# Patient Record
Sex: MALE | Race: WHITE | NOT HISPANIC OR LATINO | Employment: OTHER | URBAN - METROPOLITAN AREA
[De-identification: names, ages, dates, MRNs, and addresses within clinical notes are randomized per-mention and may not be internally consistent; named-entity substitution may affect disease eponyms.]

---

## 2017-01-11 ENCOUNTER — FOLLOW UP (OUTPATIENT)
Dept: URBAN - METROPOLITAN AREA CLINIC 33 | Facility: CLINIC | Age: 82
End: 2017-01-11

## 2017-01-11 VITALS
WEIGHT: 145 LBS | DIASTOLIC BLOOD PRESSURE: 90 MMHG | SYSTOLIC BLOOD PRESSURE: 150 MMHG | HEART RATE: 96 BPM | BODY MASS INDEX: 21.98 KG/M2 | HEIGHT: 68 IN

## 2017-01-11 DIAGNOSIS — H40.1130: ICD-10-CM

## 2017-01-11 DIAGNOSIS — H35.341: ICD-10-CM

## 2017-01-11 DIAGNOSIS — H35.372: ICD-10-CM

## 2017-01-11 DIAGNOSIS — H43.812: ICD-10-CM

## 2017-01-11 DIAGNOSIS — H35.81: ICD-10-CM

## 2017-01-11 PROCEDURE — 1036F TOBACCO NON-USER: CPT

## 2017-01-11 PROCEDURE — 92250 FUNDUS PHOTOGRAPHY W/I&R: CPT

## 2017-01-11 PROCEDURE — 92014 COMPRE OPH EXAM EST PT 1/>: CPT

## 2017-01-11 PROCEDURE — 2027F OPTIC NERVE HEAD EVAL DONE: CPT

## 2017-01-11 PROCEDURE — G8420 CALC BMI NORM PARAMETERS: HCPCS

## 2017-01-11 PROCEDURE — G8427 DOCREV CUR MEDS BY ELIG CLIN: HCPCS

## 2017-01-11 ASSESSMENT — VISUAL ACUITY
OD_SC: 20/100
OS_SC: 20/25-2

## 2017-01-11 ASSESSMENT — TONOMETRY
OD_IOP_MMHG: 17
OS_IOP_MMHG: 17

## 2017-01-13 ENCOUNTER — RX ONLY (OUTPATIENT)
Age: 82
Setting detail: RX ONLY
End: 2017-01-13

## 2017-01-13 RX ORDER — KETOCONAZOLE 20.5 MG/ML
SHAMPOO, SUSPENSION TOPICAL
Qty: 1 | Refills: 11 | Status: ERX

## 2017-08-09 ENCOUNTER — EMERGENCY (EMERGENCY)
Facility: HOSPITAL | Age: 82
LOS: 1 days | Discharge: HOME | End: 2017-08-09
Attending: EMERGENCY MEDICINE | Admitting: EMERGENCY MEDICINE
Payer: MEDICARE

## 2017-08-09 VITALS
DIASTOLIC BLOOD PRESSURE: 102 MMHG | SYSTOLIC BLOOD PRESSURE: 203 MMHG | OXYGEN SATURATION: 99 % | RESPIRATION RATE: 16 BRPM | TEMPERATURE: 98 F | HEART RATE: 72 BPM

## 2017-08-09 VITALS — HEART RATE: 70 BPM | DIASTOLIC BLOOD PRESSURE: 90 MMHG | SYSTOLIC BLOOD PRESSURE: 190 MMHG
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
## 2017-08-09 DIAGNOSIS — S09.90XA UNSPECIFIED INJURY OF HEAD, INITIAL ENCOUNTER: ICD-10-CM

## 2017-08-09 DIAGNOSIS — I10 ESSENTIAL (PRIMARY) HYPERTENSION: ICD-10-CM
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
## 2017-08-09 PROCEDURE — 99284 EMERGENCY DEPT VISIT MOD MDM: CPT | Mod: 25

## 2017-08-09 PROCEDURE — 99284 EMERGENCY DEPT VISIT MOD MDM: CPT

## 2017-08-09 PROCEDURE — 72125 CT NECK SPINE W/O DYE: CPT

## 2017-08-09 PROCEDURE — 72125 CT NECK SPINE W/O DYE: CPT | Mod: 26

## 2017-08-09 PROCEDURE — 70450 CT HEAD/BRAIN W/O DYE: CPT | Mod: 26

## 2017-08-09 PROCEDURE — 70450 CT HEAD/BRAIN W/O DYE: CPT

## 2017-08-09 NOTE — ED ADULT TRIAGE NOTE - CHIEF COMPLAINT QUOTE
pt arrived via EMS, stating that a box fell on top of nose. denies headache, LOC. abrasion to nose, bleeding controlled.

## 2017-08-09 NOTE — ED ADULT NURSE NOTE - OBJECTIVE STATEMENT
received pt in [facility] w/ abrasions to nose & forehead s/p box falling out of closet & hitting him in face last night Denies LOC Pt seen by Dr Adams Ct scan ordered

## 2017-08-09 NOTE — ED PROVIDER NOTE - CHPI ED SYMPTOMS NEG
no nausea/no syncope/no change in level of consciousness/no vomiting/no blurred vision/no seizure/no loss of consciousness

## 2018-04-17 ENCOUNTER — EMERGENCY (EMERGENCY)
Facility: HOSPITAL | Age: 83
LOS: 1 days | Discharge: ANOTHER HOSPITAL | End: 2018-04-17
Attending: EMERGENCY MEDICINE | Admitting: EMERGENCY MEDICINE
Payer: MEDICARE

## 2018-04-17 ENCOUNTER — INPATIENT (INPATIENT)
Facility: HOSPITAL | Age: 83
LOS: 1 days | Discharge: HOME | DRG: 155 | End: 2018-04-19
Attending: SURGERY | Admitting: SURGERY
Payer: MEDICARE

## 2018-04-17 VITALS
DIASTOLIC BLOOD PRESSURE: 92 MMHG | HEART RATE: 85 BPM | SYSTOLIC BLOOD PRESSURE: 151 MMHG | RESPIRATION RATE: 16 BRPM | OXYGEN SATURATION: 97 %

## 2018-04-17 VITALS
TEMPERATURE: 98 F | HEART RATE: 89 BPM | RESPIRATION RATE: 16 BRPM | DIASTOLIC BLOOD PRESSURE: 94 MMHG | OXYGEN SATURATION: 97 % | HEIGHT: 68 IN | SYSTOLIC BLOOD PRESSURE: 190 MMHG | WEIGHT: 164.02 LBS

## 2018-04-17 VITALS
RESPIRATION RATE: 18 BRPM | SYSTOLIC BLOOD PRESSURE: 162 MMHG | TEMPERATURE: 99 F | HEART RATE: 83 BPM | OXYGEN SATURATION: 96 % | DIASTOLIC BLOOD PRESSURE: 94 MMHG

## 2018-04-17 DIAGNOSIS — S13.5XXA: ICD-10-CM

## 2018-04-17 DIAGNOSIS — M10.9 GOUT, UNSPECIFIED: ICD-10-CM
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
## 2018-04-17 DIAGNOSIS — Z88.2 ALLERGY STATUS TO SULFONAMIDES: ICD-10-CM

## 2018-04-17 DIAGNOSIS — D33.3 BENIGN NEOPLASM OF CRANIAL NERVES: ICD-10-CM

## 2018-04-17 DIAGNOSIS — S01.81XA LACERATION WITHOUT FOREIGN BODY OF OTHER PART OF HEAD, INITIAL ENCOUNTER: ICD-10-CM

## 2018-04-17 DIAGNOSIS — I10 ESSENTIAL (PRIMARY) HYPERTENSION: ICD-10-CM

## 2018-04-17 DIAGNOSIS — N18.9 CHRONIC KIDNEY DISEASE, UNSPECIFIED: ICD-10-CM

## 2018-04-17 DIAGNOSIS — S09.90XA UNSPECIFIED INJURY OF HEAD, INITIAL ENCOUNTER: ICD-10-CM

## 2018-04-17 DIAGNOSIS — K51.90 ULCERATIVE COLITIS, UNSPECIFIED, WITHOUT COMPLICATIONS: ICD-10-CM

## 2018-04-17 DIAGNOSIS — G20 PARKINSON'S DISEASE: ICD-10-CM

## 2018-04-17 LAB
ALBUMIN SERPL ELPH-MCNC: 4.1 G/DL — SIGNIFICANT CHANGE UP (ref 3.3–5)
ALP SERPL-CCNC: 115 U/L — SIGNIFICANT CHANGE UP (ref 40–120)
ALT FLD-CCNC: 21 U/L — SIGNIFICANT CHANGE UP (ref 12–78)
ANION GAP SERPL CALC-SCNC: 11 MMOL/L — SIGNIFICANT CHANGE UP (ref 5–17)
APTT BLD: 42.1 SEC — HIGH (ref 27.5–37.4)
AST SERPL-CCNC: 28 U/L — SIGNIFICANT CHANGE UP (ref 15–37)
BASOPHILS # BLD AUTO: 0.1 K/UL — SIGNIFICANT CHANGE UP (ref 0–0.2)
BASOPHILS NFR BLD AUTO: 0.5 % — SIGNIFICANT CHANGE UP (ref 0–2)
BILIRUB SERPL-MCNC: 0.5 MG/DL — SIGNIFICANT CHANGE UP (ref 0.2–1.2)
BUN SERPL-MCNC: 38 MG/DL — HIGH (ref 7–23)
CALCIUM SERPL-MCNC: 9 MG/DL — SIGNIFICANT CHANGE UP (ref 8.5–10.1)
CHLORIDE SERPL-SCNC: 107 MMOL/L — SIGNIFICANT CHANGE UP (ref 96–108)
CO2 SERPL-SCNC: 24 MMOL/L — SIGNIFICANT CHANGE UP (ref 22–31)
CREAT SERPL-MCNC: 2.1 MG/DL — HIGH (ref 0.5–1.3)
EOSINOPHIL # BLD AUTO: 0 K/UL — SIGNIFICANT CHANGE UP (ref 0–0.5)
EOSINOPHIL NFR BLD AUTO: 0.1 % — SIGNIFICANT CHANGE UP (ref 0–6)
GLUCOSE SERPL-MCNC: 128 MG/DL — HIGH (ref 70–99)
HCT VFR BLD CALC: 35.2 % — LOW (ref 39–50)
HGB BLD-MCNC: 11.8 G/DL — LOW (ref 13–17)
INR BLD: 1 RATIO — SIGNIFICANT CHANGE UP (ref 0.88–1.16)
LYMPHOCYTES # BLD AUTO: 1.7 K/UL — SIGNIFICANT CHANGE UP (ref 1–3.3)
LYMPHOCYTES # BLD AUTO: 15.5 % — SIGNIFICANT CHANGE UP (ref 13–44)
MCHC RBC-ENTMCNC: 33.4 GM/DL — SIGNIFICANT CHANGE UP (ref 32–36)
MCHC RBC-ENTMCNC: 34.5 PG — HIGH (ref 27–34)
MCV RBC AUTO: 103 FL — HIGH (ref 80–100)
MONOCYTES # BLD AUTO: 1 K/UL — HIGH (ref 0–0.9)
MONOCYTES NFR BLD AUTO: 9.3 % — HIGH (ref 1–9)
NEUTROPHILS # BLD AUTO: 8.2 K/UL — HIGH (ref 1.8–7.4)
NEUTROPHILS NFR BLD AUTO: 74.7 % — SIGNIFICANT CHANGE UP (ref 43–77)
PLATELET # BLD AUTO: 187 K/UL — SIGNIFICANT CHANGE UP (ref 150–400)
POTASSIUM SERPL-MCNC: 4.5 MMOL/L — SIGNIFICANT CHANGE UP (ref 3.5–5.3)
POTASSIUM SERPL-SCNC: 4.5 MMOL/L — SIGNIFICANT CHANGE UP (ref 3.5–5.3)
PROT SERPL-MCNC: 7.5 G/DL — SIGNIFICANT CHANGE UP (ref 6–8.3)
PROTHROM AB SERPL-ACNC: 10.9 SEC — SIGNIFICANT CHANGE UP (ref 9.8–12.7)
RBC # BLD: 3.42 M/UL — LOW (ref 4.2–5.8)
RBC # FLD: 13 % — SIGNIFICANT CHANGE UP (ref 10.3–14.5)
SODIUM SERPL-SCNC: 142 MMOL/L — SIGNIFICANT CHANGE UP (ref 135–145)
WBC # BLD: 10.9 K/UL — HIGH (ref 3.8–10.5)
WBC # FLD AUTO: 10.9 K/UL — HIGH (ref 3.8–10.5)

## 2018-04-17 PROCEDURE — 72125 CT NECK SPINE W/O DYE: CPT | Mod: 26

## 2018-04-17 PROCEDURE — 85610 PROTHROMBIN TIME: CPT

## 2018-04-17 PROCEDURE — 99285 EMERGENCY DEPT VISIT HI MDM: CPT | Mod: GC

## 2018-04-17 PROCEDURE — 72125 CT NECK SPINE W/O DYE: CPT

## 2018-04-17 PROCEDURE — 13132 CMPLX RPR F/C/C/M/N/AX/G/H/F: CPT

## 2018-04-17 PROCEDURE — 70486 CT MAXILLOFACIAL W/O DYE: CPT | Mod: 26

## 2018-04-17 PROCEDURE — 70486 CT MAXILLOFACIAL W/O DYE: CPT

## 2018-04-17 PROCEDURE — 70490 CT SOFT TISSUE NECK W/O DYE: CPT

## 2018-04-17 PROCEDURE — 85027 COMPLETE CBC AUTOMATED: CPT

## 2018-04-17 PROCEDURE — 70450 CT HEAD/BRAIN W/O DYE: CPT

## 2018-04-17 PROCEDURE — 85730 THROMBOPLASTIN TIME PARTIAL: CPT

## 2018-04-17 PROCEDURE — 70450 CT HEAD/BRAIN W/O DYE: CPT | Mod: 26

## 2018-04-17 PROCEDURE — 80053 COMPREHEN METABOLIC PANEL: CPT

## 2018-04-17 PROCEDURE — 99285 EMERGENCY DEPT VISIT HI MDM: CPT | Mod: 25

## 2018-04-17 PROCEDURE — 70490 CT SOFT TISSUE NECK W/O DYE: CPT | Mod: 26

## 2018-04-17 NOTE — CONSULT NOTE ADULT - ASSESSMENT
a/p  requires exploration of wound   excision and debridement and complex 5 cm laceration repair    nature of wound d/w ED an trauma team ns, for possible further w/u  f/u as outpt

## 2018-04-17 NOTE — ED ADULT NURSE NOTE - OBJECTIVE STATEMENT
84 y/o male BIB EMS transfer from [facility] for neck injury, [facility], with laceration to rt side of chin, sutures intact.  As per EMS, pt transferred for fracture of thyroid cartilage, needs further evaluation and endoscopy.  Pt denies LOC, dizziness,  lightheadedness, pain, SOB, chest pain.  No acute respiratory distress noted. 82 y/o male BIB EMS transfer from [facility] for neck injury, s/p tripped and fell [facility] at the [facility], with laceration to rt side of chin, sutures intact.  As per EMS, pt transferred for fracture of thyroid cartilage, needs further evaluation/ trauma consult.  Pt denies LOC, dizziness,  lightheadedness, pain, SOB, chest pain.  No acute respiratory distress noted.

## 2018-04-17 NOTE — H&P ADULT - HISTORY OF PRESENT ILLNESS
Trauma Consult    HPI: Patient is a 83y old  Male was transferred from [place] (mrn- [id number]) for trauma consult who recommended ENT input. Pt with known hx of parkinsons, UC, HTN s/p mechanical fall early this morning at assisted living, hitting his neck on side of TV table. Pt was BIB EMS to [facility] where he had his neck lac repaired by plastic surgery.  Pt Ct without contrast reveals Extensive infiltration of the deep and subcutaneous soft tissues of right side of the neck with multiple gas bubbles dated the right masseter and pterygoid muscles with focal fracture of the left inferior thyroid cartilage. No evidence of mass effect on the airway. Pt denies any dyspnea, change in voice, dysphonia, dysphagia, odynophagia, hemoptysis, neck pain. Pt is breathing conformably in bed with no difficulty.       Primary Survey  A - airway intact  B - bilateral breath sounds and good chest rise  C - initially BP: 173/91 (04-17-18 @ 23:20), palpable pulses in all extremities  D - GCS 15 on arrival  Exposure obtained      Secondary survey  gen: NAD, alert and interactive, oriented.  Neck: laceration repaired, hematoma on right side of neck, tender  CV: s1, s2, RRR  Pulm: CTA B/L  Chest: No TTP  Abd: Soft, non- distended, Non tender to palpation, no rebound, no guarding  Groin: Normal appearing  Ext: palp radial b/l UE, b/l DP palp in Lower Extrem.   Back: no TTP, no palpable runoff/stepoff/deformity

## 2018-04-17 NOTE — ED PROVIDER NOTE - OBJECTIVE STATEMENT
pt bib ems for head inj and chin lac s/p trip and fall at assisted living. no loc, d/n/v, cp, sob, abd pain, neck or back pain, weakness, numbness, cp, sob, abd pain, arm or leg pain. tetanus utd  pmd - cristian pt bib ems for head inj and chin lac s/p trip and fall at assisted living. no loc, d/n/v, cp, sob, abd pain, neck or back pain, weakness, numbness, cp, sob, abd pain, arm or leg pain. tetanus utd.  pmd - cristian pt bib ems for head inj and chin lac s/p trip and fall at assisted living. no loc, d/n/v, cp, sob, abd pain, neck or back pain, weakness, numbness, cp, sob, abd pain, arm or leg pain. tetanus utd. no diff breathing or swallowing or speaking  pmd - carpentieri

## 2018-04-17 NOTE — ED PROVIDER NOTE - OBJECTIVE STATEMENT
Patient transferred from [place] for trauma consult.  patient reports mechanical fall.  Results from [place] reviewed.  denies shortness of breath, neck pain, chest pain.      [place] MRN: [id number]    Extensive infiltration of the deep and subcutaneous soft tissues of right   side of the neck with multiple gas bubbles dated the right masseter and   pterygoid muscles with focal fracture of the left inferior thyroid   cartilage. No evidence of mass effect on the airway. 84yo male transfer from [facility] for thyroid cartilage injury s/p mechanical fall. Seen at OSH, plastic surgery repaired laceration, but noticed damage to platysma. Recommended transfer for trauma surgery evaluation. Pt. denies any sob at this time. Denies cp, n/v, weakness, numbness, headache, vision/hearing chagnes.     Patient transferred from [place] for trauma consult.  patient reports mechanical fall.  Results from [place] reviewed.  denies shortness of breath, neck pain, chest pain.      [place] MRN: [id number]    Extensive infiltration of the deep and subcutaneous soft tissues of right   side of the neck with multiple gas bubbles dated the right masseter and   pterygoid muscles with focal fracture of the left inferior thyroid   cartilage. No evidence of mass effect on the airway.

## 2018-04-17 NOTE — ED PROVIDER NOTE - PROGRESS NOTE DETAILS
tom (plastics) seen eval pt, relates injury violated platysma, requests d/w [place] trauma d/w dr simmons ([place] [facility]), he d/w tom, recommends cta neck tom (plastics) seen eval sutured pt, relates injury violated platysma, requests d/w [place] trauma d/w iris ([facility]), will accept xfer er->er. efrain ([place] er) will accept xfer

## 2018-04-17 NOTE — ED PROVIDER NOTE - MUSCULOSKELETAL, MLM
Spine appears normal, range of motion is not limited, mild tender right jaw, otherwise no muscle or joint tenderness

## 2018-04-17 NOTE — H&P ADULT - ASSESSMENT
83y year old Male who presents with fall at facility, he had neck laceration and thyroid cartilage fracture, subcutaneous air. plastics surgery repaired the laceration.   - Admit to Team Trauma Surgery  - DVT PPx  - NPO/IVF  - f/u ENT recommendation  - IV Abx - zosyn  - pain control  - d/w Dr. Sy

## 2018-04-17 NOTE — ED ADULT NURSE NOTE - OBJECTIVE STATEMENT
Pt. A&Ox3, c/o slip and fall today while walking. States he was on his way to his girlfriends room when he tripped on the rug hitting the right side of his chin on a piece of furniture and fell on his right knee. Abrasion seen to the right knee. Laceration approx. 3 inches long and actively bleeding. On blood thinners. Denies LOC, dizziness, cp or sob. Denies any headache, blurry vision, or pain to any extremity. Able to ambulate after fall. Awaiting scans. VSS, breathing well on RA. Will continue to monitor.

## 2018-04-17 NOTE — CONSULT NOTE ADULT - ASSESSMENT
83y s/p mechanical fall resulting in neck injury. Ct scan from [place] reveals extensive infiltration of the deep and subcutaneous soft tissues of right side of the neck with multiple gas bubbles dated the right masseter and pterygoid muscles with focal fracture of the left inferior thyroid cartilage. No evidence of mass effect on the airway. Indirect laryngoscopy shows no laryngeal edema, VC mobile and intact, airway patent. 83y s/p mechanical fall with nondisplaced thryoid cartilage fracture. airway widely patent on laryngoscopy

## 2018-04-17 NOTE — CONSULT NOTE ADULT - PROBLEM SELECTOR RECOMMENDATION 9
- CDU for airway observation  - ENT will continue to follow -airway observation  -decadron 10mg IV q8hrs x 3 doses  -no acute ENT surgical intervention

## 2018-04-17 NOTE — ED PROVIDER NOTE - CARE PLAN
Principal Discharge DX:	Chin laceration, initial encounter  Secondary Diagnosis:	Injury of head, initial encounter

## 2018-04-17 NOTE — CONSULT NOTE ADULT - SUBJECTIVE AND OBJECTIVE BOX
CC: thyroid cartilage fx and anterior neck hematoma    HPI: Patient is a 83y old  Male who we are asked to evaluate the patient for thyroid cartilage fx and anterior neck hematoma. Patient transferred from [place] (mrn- [id number]) for trauma consult who recommended ENT input. Pt with known hx of parkinsons s/p mechanical fall early this morning, hitting his neck on side of TV table. Pt Ct reveals Extensive infiltration of the deep and subcutaneous soft tissues of right side of the neck with multiple gas bubbles dated the right masseter and pterygoid muscles with focal fracture of the left inferior thyroid cartilage. No evidence of mass effect on the airway. Pt denies any dyspnea, change in voice, dysphonia, dysphagia, odynophagia, hemoptysis, neck pain.     PAST MEDICAL & SURGICAL HISTORY:  UC (ulcerative colitis)  HTN (hypertension)    Allergies    No Known Allergies    Intolerances      MEDICATIONS  (STANDING):    MEDICATIONS  (PRN):      Social History: no tobacco, no etoh    ROS: ENT, GI, , CV, Pulm, Neuro, Psych, MS, Heme, Endo, Constitutional; all negative except as noted in HPI    Vital Signs Last 24 Hrs  T(C): 36.6 (17 Apr 2018 19:54), Max: 37 (17 Apr 2018 18:28)  T(F): 97.8 (17 Apr 2018 19:54), Max: 98.6 (17 Apr 2018 18:28)  HR: 73 (17 Apr 2018 19:54) (73 - 83)  BP: 158/97 (17 Apr 2018 19:54) (158/97 - 165/83)  BP(mean): --  RR: 18 (17 Apr 2018 19:54) (18 - 18)  SpO2: 98% (17 Apr 2018 19:54) (96% - 98%)              PHYSICAL EXAM:  Gen: NAD, well-developed  Head: Normocephalic   Face: no edema/erythema/fluctuance  Eyes: no scleral injection  Nose: Nares bilaterally patent, no discharge  Mouth: + trismus, no base of tongued elevation,  No Stridor / Drooling.   Mucosa moist, tongue/uvula midline, oropharynx clear  Neck: +erythema and edema on right side of with sutures lac done by [facility], no lymphadenopathy, trachea midline, no masses, no tenderness to palpation   Resp: breathing easily, no stridor  CV: no peripheral edema/cyanosis    Fiberoptic Indirect laryngoscopy:  (With Scope #2) no laryngeal edema, VC mobile and intact, airway patent. No bleeding in nasal pharynx or Oral pharynx.  No foreign body or pooling of secretions.  No glottic / supraglottic swelling. CC: thyroid cartilage fx and anterior neck hematoma    HPI: Patient is a 83y old  Male who we are asked to evaluate the patient for thyroid cartilage fx and anterior neck hematoma. Patient transferred from [place] (mrn- [id number]) for trauma consult who recommended ENT input. Pt with known hx of parkinsons, UC, HTN s/p mechanical fall early this morning at assisted living, hitting his neck on side of TV table. Pt was bib EMS to [facility] where he had his neck lac repaired.  Pt Ct without contrast reveals Extensive infiltration of the deep and subcutaneous soft tissues of right side of the neck with multiple gas bubbles dated the right masseter and pterygoid muscles with focal fracture of the left inferior thyroid cartilage. No evidence of mass effect on the airway. Pt denies any dyspnea, change in voice, dysphonia, dysphagia, odynophagia, hemoptysis, neck pain. Pt is breathing conformably in bed with no difficulty.     PAST MEDICAL & SURGICAL HISTORY:  UC (ulcerative colitis)  HTN (hypertension)    Allergies    No Known Allergies    Intolerances      MEDICATIONS  (STANDING):    MEDICATIONS  (PRN):      Social History: no tobacco, no etoh    ROS: ENT, GI, , CV, Pulm, Neuro, Psych, MS, Heme, Endo, Constitutional; all negative except as noted in HPI    Vital Signs Last 24 Hrs  T(C): 36.6 (17 Apr 2018 19:54), Max: 37 (17 Apr 2018 18:28)  T(F): 97.8 (17 Apr 2018 19:54), Max: 98.6 (17 Apr 2018 18:28)  HR: 73 (17 Apr 2018 19:54) (73 - 83)  BP: 158/97 (17 Apr 2018 19:54) (158/97 - 165/83)  BP(mean): --  RR: 18 (17 Apr 2018 19:54) (18 - 18)  SpO2: 98% (17 Apr 2018 19:54) (96% - 98%)              PHYSICAL EXAM:  Gen: NAD, well-developed  Head: Normocephalic   Face: no edema/erythema/fluctuance  Eyes: no scleral injection  Nose: Nares bilaterally patent, no discharge  Mouth: + trismus, no base of tongued elevation,  No Stridor / Drooling.   Mucosa moist, tongue/uvula midline, oropharynx clear  Neck: +erythema and edema on right side of with sutures lac done by [facility], no lymphadenopathy, trachea midline, no masses, no tenderness to palpation   Resp: breathing easily, no stridor  CV: no peripheral edema/cyanosis    Fiberoptic Indirect laryngoscopy:  (With Scope #2) no laryngeal edema, VC mobile and intact, airway patent. No bleeding in nasal pharynx or Oral pharynx.  No foreign body or pooling of secretions.  No glottic / supraglottic swelling.

## 2018-04-17 NOTE — ED PROVIDER NOTE - MEDICAL DECISION MAKING DETAILS
Attending Note (Bernabe): patient with left thyroid cartilage fracture.  trauma consult.  ent consult.

## 2018-04-17 NOTE — ED ADULT NURSE REASSESSMENT NOTE - NS ED NURSE REASSESS COMMENT FT1
Gave rpt to Victor Hugo at transport center. Fx of thyroid cartilage found on CT. Stable at this time with no complaints of pain, sob or difficulty swallowing. Will continue to monitor.

## 2018-04-17 NOTE — ED PROVIDER NOTE - CHPI ED SYMPTOMS NEG
no vomiting/no weakness/no loss of consciousness/no nausea/no change in level of consciousness/no dizziness

## 2018-04-17 NOTE — ED PROVIDER NOTE - SKIN, MLM
Skin normal color for race, warm, dry. 6cm right chin laceration Skin normal color for race, warm, dry. 6cm right chin laceration extending over mandible to neck

## 2018-04-17 NOTE — CONSULT NOTE ADULT - SUBJECTIVE AND OBJECTIVE BOX
this is an 82 yo male s/p trip and fall  pt remembers accident and fall  denies loc  vison and occlusion nl  no other significant injuries  called to evaluate laceration    pmshx:    parkinsons dz  acoustic neuroma  kidney dz  gout  htn  ulcerative colitis      meds    acidophilus  aggrenox  allopurinol  atorvostatin  carbidopa  colace  hctz  clonopin  lisinopril  sulfasalazine  viagra    all  none    exam  a/a  w/o distress  no sob  eomi  fn intact   facial sens nl  no gross facial deformity or fx  rt upper neck (upper zone 2) 5 cm laceration with injured and devitalized skin edges  + partial platysma injury  no other deep structure injury

## 2018-04-18 DIAGNOSIS — G20 PARKINSON'S DISEASE: ICD-10-CM

## 2018-04-18 DIAGNOSIS — K51.90 ULCERATIVE COLITIS, UNSPECIFIED, WITHOUT COMPLICATIONS: ICD-10-CM

## 2018-04-18 DIAGNOSIS — M10.9 GOUT, UNSPECIFIED: ICD-10-CM

## 2018-04-18 DIAGNOSIS — S13.5XXA: ICD-10-CM

## 2018-04-18 DIAGNOSIS — G45.9 TRANSIENT CEREBRAL ISCHEMIC ATTACK, UNSPECIFIED: ICD-10-CM

## 2018-04-18 DIAGNOSIS — S12.8XXA FRACTURE OF OTHER PARTS OF NECK, INITIAL ENCOUNTER: ICD-10-CM

## 2018-04-18 DIAGNOSIS — I10 ESSENTIAL (PRIMARY) HYPERTENSION: ICD-10-CM

## 2018-04-18 PROCEDURE — 99223 1ST HOSP IP/OBS HIGH 75: CPT

## 2018-04-18 PROCEDURE — 99231 SBSQ HOSP IP/OBS SF/LOW 25: CPT

## 2018-04-18 RX ORDER — SULFASALAZINE 500 MG
1 TABLET ORAL
Qty: 0 | Refills: 0 | COMMUNITY

## 2018-04-18 RX ORDER — METOPROLOL TARTRATE 50 MG
25 TABLET ORAL ONCE
Qty: 0 | Refills: 0 | Status: COMPLETED | OUTPATIENT
Start: 2018-04-18 | End: 2018-04-18

## 2018-04-18 RX ORDER — SULFASALAZINE 500 MG
2 TABLET ORAL
Qty: 0 | Refills: 0 | COMMUNITY

## 2018-04-18 RX ORDER — MORPHINE SULFATE 50 MG/1
4 CAPSULE, EXTENDED RELEASE ORAL EVERY 4 HOURS
Qty: 0 | Refills: 0 | Status: DISCONTINUED | OUTPATIENT
Start: 2018-04-17 | End: 2018-04-18

## 2018-04-18 RX ORDER — ALLOPURINOL 300 MG
100 TABLET ORAL
Qty: 0 | Refills: 0 | Status: DISCONTINUED | OUTPATIENT
Start: 2018-04-18 | End: 2018-04-19

## 2018-04-18 RX ORDER — AMLODIPINE BESYLATE 2.5 MG/1
5 TABLET ORAL DAILY
Qty: 0 | Refills: 0 | Status: DISCONTINUED | OUTPATIENT
Start: 2018-04-18 | End: 2018-04-19

## 2018-04-18 RX ORDER — CARBIDOPA AND LEVODOPA 25; 100 MG/1; MG/1
1 TABLET ORAL THREE TIMES A DAY
Qty: 0 | Refills: 0 | Status: DISCONTINUED | OUTPATIENT
Start: 2018-04-18 | End: 2018-04-19

## 2018-04-18 RX ORDER — PIPERACILLIN AND TAZOBACTAM 4; .5 G/20ML; G/20ML
3.38 INJECTION, POWDER, LYOPHILIZED, FOR SOLUTION INTRAVENOUS EVERY 8 HOURS
Qty: 0 | Refills: 0 | Status: DISCONTINUED | OUTPATIENT
Start: 2018-04-18 | End: 2018-04-18

## 2018-04-18 RX ORDER — DEXAMETHASONE 0.5 MG/5ML
8 ELIXIR ORAL EVERY 8 HOURS
Qty: 0 | Refills: 0 | Status: COMPLETED | OUTPATIENT
Start: 2018-04-18 | End: 2018-04-18

## 2018-04-18 RX ORDER — CEPHALEXIN 500 MG
500 CAPSULE ORAL EVERY 12 HOURS
Qty: 0 | Refills: 0 | Status: DISCONTINUED | OUTPATIENT
Start: 2018-04-18 | End: 2018-04-19

## 2018-04-18 RX ORDER — LATANOPROST 0.05 MG/ML
1 SOLUTION/ DROPS OPHTHALMIC; TOPICAL AT BEDTIME
Qty: 0 | Refills: 0 | Status: DISCONTINUED | OUTPATIENT
Start: 2018-04-18 | End: 2018-04-19

## 2018-04-18 RX ORDER — SODIUM CHLORIDE 9 MG/ML
1000 INJECTION, SOLUTION INTRAVENOUS
Qty: 0 | Refills: 0 | Status: DISCONTINUED | OUTPATIENT
Start: 2018-04-17 | End: 2018-04-18

## 2018-04-18 RX ORDER — LISINOPRIL 2.5 MG/1
40 TABLET ORAL DAILY
Qty: 0 | Refills: 0 | Status: DISCONTINUED | OUTPATIENT
Start: 2018-04-18 | End: 2018-04-19

## 2018-04-18 RX ORDER — SULFASALAZINE 500 MG
500 TABLET ORAL AT BEDTIME
Qty: 0 | Refills: 0 | Status: DISCONTINUED | OUTPATIENT
Start: 2018-04-18 | End: 2018-04-19

## 2018-04-18 RX ORDER — TIMOLOL 0.5 %
1 DROPS OPHTHALMIC (EYE)
Qty: 0 | Refills: 0 | Status: DISCONTINUED | OUTPATIENT
Start: 2018-04-18 | End: 2018-04-19

## 2018-04-18 RX ORDER — HEPARIN SODIUM 5000 [USP'U]/ML
5000 INJECTION INTRAVENOUS; SUBCUTANEOUS EVERY 8 HOURS
Qty: 0 | Refills: 0 | Status: DISCONTINUED | OUTPATIENT
Start: 2018-04-17 | End: 2018-04-19

## 2018-04-18 RX ORDER — ATORVASTATIN CALCIUM 80 MG/1
20 TABLET, FILM COATED ORAL AT BEDTIME
Qty: 0 | Refills: 0 | Status: DISCONTINUED | OUTPATIENT
Start: 2018-04-18 | End: 2018-04-19

## 2018-04-18 RX ORDER — ASPIRIN AND DIPYRIDAMOLE 25; 200 MG/1; MG/1
1 CAPSULE, EXTENDED RELEASE ORAL
Qty: 0 | Refills: 0 | Status: DISCONTINUED | OUTPATIENT
Start: 2018-04-18 | End: 2018-04-18

## 2018-04-18 RX ORDER — SULFASALAZINE 500 MG
1000 TABLET ORAL DAILY
Qty: 0 | Refills: 0 | Status: DISCONTINUED | OUTPATIENT
Start: 2018-04-18 | End: 2018-04-19

## 2018-04-18 RX ORDER — ONDANSETRON 8 MG/1
4 TABLET, FILM COATED ORAL EVERY 6 HOURS
Qty: 0 | Refills: 0 | Status: DISCONTINUED | OUTPATIENT
Start: 2018-04-17 | End: 2018-04-19

## 2018-04-18 RX ADMIN — Medication 1 DROP(S): at 16:49

## 2018-04-18 RX ADMIN — AMLODIPINE BESYLATE 5 MILLIGRAM(S): 2.5 TABLET ORAL at 15:34

## 2018-04-18 RX ADMIN — SODIUM CHLORIDE 100 MILLILITER(S): 9 INJECTION, SOLUTION INTRAVENOUS at 09:21

## 2018-04-18 RX ADMIN — CARBIDOPA AND LEVODOPA 1 TABLET(S): 25; 100 TABLET ORAL at 13:26

## 2018-04-18 RX ADMIN — Medication 500 MILLIGRAM(S): at 22:25

## 2018-04-18 RX ADMIN — CARBIDOPA AND LEVODOPA 1 TABLET(S): 25; 100 TABLET ORAL at 22:25

## 2018-04-18 RX ADMIN — Medication 101.6 MILLIGRAM(S): at 06:43

## 2018-04-18 RX ADMIN — ATORVASTATIN CALCIUM 20 MILLIGRAM(S): 80 TABLET, FILM COATED ORAL at 22:25

## 2018-04-18 RX ADMIN — Medication 25 MILLIGRAM(S): at 17:22

## 2018-04-18 RX ADMIN — SODIUM CHLORIDE 100 MILLILITER(S): 9 INJECTION, SOLUTION INTRAVENOUS at 00:48

## 2018-04-18 RX ADMIN — Medication 101.6 MILLIGRAM(S): at 22:26

## 2018-04-18 RX ADMIN — Medication 500 MILLIGRAM(S): at 09:21

## 2018-04-18 RX ADMIN — LISINOPRIL 40 MILLIGRAM(S): 2.5 TABLET ORAL at 11:32

## 2018-04-18 RX ADMIN — Medication 101.6 MILLIGRAM(S): at 13:26

## 2018-04-18 NOTE — PROGRESS NOTE ADULT - ATTENDING COMMENTS
seen and examined 4/18/2018 @ 0946    left sided thyroid cartilage fracture  no hoarseness, dysphonia or hemoptysis  -complete steroids today    fall from standing  -physical therapy eval

## 2018-04-18 NOTE — PHYSICAL THERAPY INITIAL EVALUATION ADULT - DISCHARGE DISPOSITION, PT EVAL
Return to [facility] with PT for progressive ambulation using straight cane

## 2018-04-18 NOTE — CONSULT NOTE ADULT - ASSESSMENT
83m pm leandro UC, htn who was transferred from [facility]

## 2018-04-18 NOTE — PROGRESS NOTE ADULT - SUBJECTIVE AND OBJECTIVE BOX
ENT ISSUE: thyroid cartilage fx and anterior neck hematoma    HPI: 83y old  Male with thyroid cartilage fx and anterior neck hematoma s/p fall yesterday, transferred from [place]. Pt seen and examined at bedside. No acute events overnight.  Ct without contrast yesterday reveals Extensive infiltration of the deep and subcutaneous soft tissues of right side of the neck with multiple gas bubbles dated the right masseter and pterygoid muscles with focal fracture of the left inferior thyroid cartilage. Laryngoscopy yesterday was unremarkable. Pt denies dyspnea, change in voice, dysphonia, dysphagia, odynophagia, hemoptysis, neck pain.     Vital Signs Last 24 Hrs  T(C): 36.7 (18 Apr 2018 09:43), Max: 37 (17 Apr 2018 18:28)  T(F): 98.1 (18 Apr 2018 09:43), Max: 98.6 (17 Apr 2018 18:28)  HR: 82 (18 Apr 2018 09:43) (66 - 83)  BP: 150/81 (18 Apr 2018 09:43) (148/80 - 173/91)  BP(mean): --  RR: 18 (18 Apr 2018 09:43) (18 - 18)  SpO2: 95% (18 Apr 2018 09:43) (95% - 98%)    LABS:      PHYSICAL EXAM:  Gen: NAD, well-developed  Head: Normocephalic   Face: no edema/erythema/fluctuance  Eyes: no scleral injection  Nose: Nares bilaterally patent, no discharge  Mouth: + trismus, no base of tongued elevation,  No Stridor / Drooling.   Mucosa moist, tongue/uvula midline, oropharynx clear  Neck: +erythema and edema on right side of with sutures lac done by [facility], + mild TTP to right neck, no lymphadenopathy, trachea midline, no masses  Resp: breathing easily, no stridor  CV: no peripheral edema/cyanosis

## 2018-04-18 NOTE — PROVIDER CONTACT NOTE (OTHER) - ACTION/TREATMENT ORDERED:
MD notified, will look into home meds and order accordingly.
PA spoke with pt regarding heparin and other medications, pt educated on importance of heparin and risks of not taking, will continue to monitor.
26 of Lopressor and will continue to monitor

## 2018-04-18 NOTE — PHYSICAL THERAPY INITIAL EVALUATION ADULT - ADDITIONAL COMMENTS
Patient was living at Assisted Living Facility ([facility]). Patient is independent in ambulation using straight cane.

## 2018-04-18 NOTE — PROVIDER CONTACT NOTE (OTHER) - ASSESSMENT
Pt asymptomatic. No BP meds currently ordered.
Pt is resting with no  acute signs and symptoms of any acute distress, chest pain or SOB

## 2018-04-18 NOTE — PHYSICAL THERAPY INITIAL EVALUATION ADULT - GAIT TRAINING, PT EVAL
Goal- Patient will ambulate 200 ft Indep/Supervision using RW in 1-2 weeks Goal- Patient will ambulate 200 ft Indep/Supervision using straight cane in 1-2 weeks

## 2018-04-18 NOTE — PHYSICAL THERAPY INITIAL EVALUATION ADULT - PERTINENT HX OF CURRENT PROBLEM, REHAB EVAL
83y old  Male with thyroid cartilage fx and anterior neck hematoma s/p fall yesterday, transferred from [place]. Pt seen and examined at bedside. No acute events overnight.  Ct without contrast yesterday reveals Extensive infiltration of the deep and subcutaneous soft tissues of right side of the neck with multiple gas bubbles dated the right masseter and pterygoid muscles with focal fracture of the left inferior thyroid cartilage.

## 2018-04-18 NOTE — PROGRESS NOTE ADULT - SUBJECTIVE AND OBJECTIVE BOX
GENERAL SURGERY DAILY PROGRESS NOTE:     Subjective: Patient seen and examined. Patient stable with no overnight events. Patient denies CP/ SOB/ Palpatations. Patient denies N/V. Patient reports he uses pharmacy at [facility]. Reports he would like to go home.     MEDICATIONS  (STANDING):  amLODIPine   Tablet 5 milliGRAM(s) Oral daily  atorvastatin 20 milliGRAM(s) Oral at bedtime  carbidopa/levodopa  25/100 1 Tablet(s) Oral three times a day  cephalexin 500 milliGRAM(s) Oral every 12 hours  dexamethasone  IVPB 8 milliGRAM(s) IV Intermittent every 8 hours  heparin  Injectable 5000 Unit(s) SubCutaneous every 8 hours  lisinopril 40 milliGRAM(s) Oral daily    MEDICATIONS  (PRN):  ondansetron Injectable 4 milliGRAM(s) IV Push every 6 hours PRN Nausea    Vital Signs Last 24 Hrs  T(C): 36.7 (18 Apr 2018 09:43), Max: 37 (17 Apr 2018 18:28)  T(F): 98.1 (18 Apr 2018 09:43), Max: 98.6 (17 Apr 2018 18:28)  HR: 82 (18 Apr 2018 09:43) (66 - 85)  BP: 150/81 (18 Apr 2018 09:43) (148/80 - 173/91)  BP(mean): --  RR: 18 (18 Apr 2018 09:43) (16 - 18)  SpO2: 95% (18 Apr 2018 09:43) (95% - 98%)    I&O's Detail  17 Apr 2018 07:01  -  18 Apr 2018 07:00  --------------------------------------------------------  IN:    lactated ringers.: 600 mL  Total IN: 600 mL    OUT:    Voided: 375 mL  Total OUT: 375 mL  Total NET: 225 mL    18 Apr 2018 07:01  -  18 Apr 2018 11:27  --------------------------------------------------------  IN:    Oral Fluid: 275 mL  Total IN: 275 mL  OUT:    Voided: 200 mL  Total OUT: 200 mL  Total NET: 75 mL    LABS:                        10.1   5.86  )-----------( 156      ( 18 Apr 2018 09:16 )             31.3     04-18    142  |  106  |  38<H>  ----------------------------<  116<H>  4.8   |  21<L>  |  2.04<H>    Ca    9.2      18 Apr 2018 07:19  TPro  7.5  /  Alb  4.1  /  TBili  0.5  /  DBili  x   /  AST  28  /  ALT  21  /  AlkPhos  115  04-17  PT/INR - ( 17 Apr 2018 13:33 )   PT: 10.9 sec;   INR: 1.00 ratio    PTT - ( 17 Apr 2018 13:33 )  PTT:42.1 sec    Physical Exam:   Gen: NAD, AAOX3  Abd: soft, NT, ND  Right chin swollen, with sutures noted.   Ecchymosis and bruising   Trachea midline   Neuro: cranial nerves grossly intact     Assessment:    83y Male who presents with Thyroid region sprain    Plan:  -DVT PPX  -Pain control   -OOB as tolerated with assistance   -Advance diet as tolerated  -Called Rx pharmacy for PO medication   -Put back on home medication   -Follow up PT   -Follow up Recommendations as per hospitalist    -Dispo Planning     Laura Gonzalez   #[id number] 04-18-18 @ 11:27

## 2018-04-18 NOTE — PROGRESS NOTE ADULT - ASSESSMENT
83y s/p mechanical fall sustaining nondisplaced thryoid cartilage fracture. Airway widely patent on laryngoscopy yesterday. Doing well.

## 2018-04-18 NOTE — CHART NOTE - NSCHARTNOTEFT_GEN_A_CORE
Confirmed with patients Pharmacy and pharmacist Vijaya at [facility] on [address] in [place]. [phone]. Confirmed following medication list and confirmed that they were refilled and obtained this April 2018.     -Atorvstatin 20 mg QD  -Carbadopa 25 mg /Levadopa 100mg TID tablets  -Amlodipine 5 mg QD  -Klonapin 0.5 mg 2 tabs at bed time  -Lisinopril 40 mg QD    Eye Drops:   -Timolol 0.5% 1 drop twice daily both eyes  -Letanaprost 0.005% 1 drop each eye each night at bed time   -Brimonidine 0.2% one drop left eye Twice day     Discussed with hospitalist will see patient and follow up and confirm medications with [facility].   Laura Gonzalez PA-C  #[id number]

## 2018-04-18 NOTE — CONSULT NOTE ADULT - PROBLEM SELECTOR RECOMMENDATION 9
extensive infiltration of deep/subcutaneous soft tissues of R neck w/ multiple gas bubbles  mgmt per surgery/ENT  on keflex, cont per surgery

## 2018-04-19 VITALS
TEMPERATURE: 98 F | OXYGEN SATURATION: 94 % | SYSTOLIC BLOOD PRESSURE: 148 MMHG | DIASTOLIC BLOOD PRESSURE: 78 MMHG | HEART RATE: 74 BPM | RESPIRATION RATE: 18 BRPM

## 2018-04-19 LAB
ANION GAP SERPL CALC-SCNC: 16 MMOL/L — SIGNIFICANT CHANGE UP (ref 5–17)
BUN SERPL-MCNC: 48 MG/DL — HIGH (ref 7–23)
CALCIUM SERPL-MCNC: 9.4 MG/DL — SIGNIFICANT CHANGE UP (ref 8.4–10.5)
CHLORIDE SERPL-SCNC: 102 MMOL/L — SIGNIFICANT CHANGE UP (ref 96–108)
CO2 SERPL-SCNC: 20 MMOL/L — LOW (ref 22–31)
CREAT SERPL-MCNC: 1.94 MG/DL — HIGH (ref 0.5–1.3)
GLUCOSE SERPL-MCNC: 173 MG/DL — HIGH (ref 70–99)
HCT VFR BLD CALC: 31.7 % — LOW (ref 39–50)
HGB BLD-MCNC: 10.1 G/DL — LOW (ref 13–17)
MAGNESIUM SERPL-MCNC: 2 MG/DL — SIGNIFICANT CHANGE UP (ref 1.6–2.6)
MCHC RBC-ENTMCNC: 31.9 GM/DL — LOW (ref 32–36)
MCHC RBC-ENTMCNC: 32.4 PG — SIGNIFICANT CHANGE UP (ref 27–34)
MCV RBC AUTO: 101.6 FL — HIGH (ref 80–100)
PHOSPHATE SERPL-MCNC: 3.7 MG/DL — SIGNIFICANT CHANGE UP (ref 2.5–4.5)
PLATELET # BLD AUTO: 172 K/UL — SIGNIFICANT CHANGE UP (ref 150–400)
POTASSIUM SERPL-MCNC: 5 MMOL/L — SIGNIFICANT CHANGE UP (ref 3.5–5.3)
POTASSIUM SERPL-SCNC: 5 MMOL/L — SIGNIFICANT CHANGE UP (ref 3.5–5.3)
RBC # BLD: 3.12 M/UL — LOW (ref 4.2–5.8)
RBC # FLD: 13.2 % — SIGNIFICANT CHANGE UP (ref 10.3–14.5)
SODIUM SERPL-SCNC: 138 MMOL/L — SIGNIFICANT CHANGE UP (ref 135–145)
WBC # BLD: 7.02 K/UL — SIGNIFICANT CHANGE UP (ref 3.8–10.5)
WBC # FLD AUTO: 7.02 K/UL — SIGNIFICANT CHANGE UP (ref 3.8–10.5)

## 2018-04-19 PROCEDURE — 84100 ASSAY OF PHOSPHORUS: CPT

## 2018-04-19 PROCEDURE — 99233 SBSQ HOSP IP/OBS HIGH 50: CPT

## 2018-04-19 PROCEDURE — 80048 BASIC METABOLIC PNL TOTAL CA: CPT

## 2018-04-19 PROCEDURE — 99231 SBSQ HOSP IP/OBS SF/LOW 25: CPT

## 2018-04-19 PROCEDURE — 99285 EMERGENCY DEPT VISIT HI MDM: CPT

## 2018-04-19 PROCEDURE — 85027 COMPLETE CBC AUTOMATED: CPT

## 2018-04-19 PROCEDURE — 83735 ASSAY OF MAGNESIUM: CPT

## 2018-04-19 PROCEDURE — 97161 PT EVAL LOW COMPLEX 20 MIN: CPT

## 2018-04-19 RX ORDER — ALLOPURINOL 300 MG
1 TABLET ORAL
Qty: 0 | Refills: 0 | COMMUNITY

## 2018-04-19 RX ORDER — CEPHALEXIN 500 MG
1 CAPSULE ORAL
Qty: 8 | Refills: 0
Start: 2018-04-19 | End: 2018-04-22

## 2018-04-19 RX ORDER — ASPIRIN AND DIPYRIDAMOLE 25; 200 MG/1; MG/1
1 CAPSULE, EXTENDED RELEASE ORAL
Qty: 0 | Refills: 0 | COMMUNITY

## 2018-04-19 RX ORDER — AMLODIPINE BESYLATE 2.5 MG/1
1 TABLET ORAL
Qty: 0 | Refills: 0 | DISCHARGE
Start: 2018-04-19

## 2018-04-19 RX ADMIN — Medication 1000 MILLIGRAM(S): at 12:19

## 2018-04-19 RX ADMIN — Medication 500 MILLIGRAM(S): at 10:10

## 2018-04-19 RX ADMIN — AMLODIPINE BESYLATE 5 MILLIGRAM(S): 2.5 TABLET ORAL at 06:02

## 2018-04-19 RX ADMIN — Medication 1 DROP(S): at 06:03

## 2018-04-19 RX ADMIN — CARBIDOPA AND LEVODOPA 1 TABLET(S): 25; 100 TABLET ORAL at 06:02

## 2018-04-19 RX ADMIN — CARBIDOPA AND LEVODOPA 1 TABLET(S): 25; 100 TABLET ORAL at 14:41

## 2018-04-19 RX ADMIN — LISINOPRIL 40 MILLIGRAM(S): 2.5 TABLET ORAL at 06:02

## 2018-04-19 NOTE — DISCHARGE NOTE ADULT - MEDICATION SUMMARY - MEDICATIONS TO STOP TAKING
I will STOP taking the medications listed below when I get home from the hospital:    allopurinol 100 mg oral tablet  -- 1 tab(s) by mouth 2 times a day    Viagra 100 mg oral tablet  -- 1 tab(s) by mouth once a day

## 2018-04-19 NOTE — PROGRESS NOTE ADULT - ASSESSMENT
83m pm leandro UC, htn who was transferred from [facility]

## 2018-04-19 NOTE — DISCHARGE NOTE ADULT - PATIENT PORTAL LINK FT
You can access the [facility] Patient Portal, offered by [facility], by registering with the following website: [email or web address]

## 2018-04-19 NOTE — DISCHARGE NOTE ADULT - ADDITIONAL INSTRUCTIONS
Please call for a follow up appointment with your primary care medical doctor upon discharge regarding your recent hospitalization.

## 2018-04-19 NOTE — PROGRESS NOTE ADULT - PROBLEM SELECTOR PLAN 1
extensive infiltration of deep/subcutaneous soft tissues of R neck w/ multiple gas bubbles  mgmt per surgery/ENT  on keflex, cont per surgery.

## 2018-04-19 NOTE — DISCHARGE NOTE ADULT - HOSPITAL COURSE
Patient is a 83y old  Male was transferred from [place] (mrn- [id number]) for trauma consult who recommended ENT input. Pt with known hx of parkinsons, UC, HTN s/p mechanical fall early this morning at assisted living, hitting his neck on side of TV table. Pt was BIB EMS to [facility] where he had his neck lac repaired by plastic surgery.  Pt Ct without contrast reveals Extensive infiltration of the deep and subcutaneous soft tissues of right side of the neck with multiple gas bubbles dated the right masseter and pterygoid muscles with focal fracture of the left inferior thyroid cartilage. No evidence of mass effect on the airway. Pt denies any dyspnea, change in voice, dysphonia, dysphagia, odynophagia, hemoptysis, neck pain. Pt is breathing conformably in bed with no difficulty. ENT was consulted and recommended decadron and airway observation. Patient remained stable.  Medicine followed. PT recomended home PT and assisted living. Patient completed steroids. Stable for discharge back to [facility] 3 with outpatient follow up with plastic surgery in 1-2 weeks. Aggrenox to be restarted after 5 days as per Dr. Rojas.

## 2018-04-19 NOTE — DISCHARGE NOTE ADULT - CARE PROVIDER_API CALL
Sami Lord), Plastic Surgery; Surgery  [address] [facility]  [address]  [place] [address]  Phone: [phone]  Fax: [phone]    Ben Loredo), Otolaryngology  [address]  [address]  [place] [address]  Phone: [phone]  Fax: [phone]    Arturo Rojas), Surgery; Surgical Critical Care  [address] [id number]  Phone: [phone]  Fax: [phone]

## 2018-04-19 NOTE — DISCHARGE NOTE ADULT - MEDICATION SUMMARY - MEDICATIONS TO TAKE
I will START or STAY ON the medications listed below when I get home from the hospital:    sulfaSALAzine 500 mg oral tablet  -- 2 tab(s) by mouth once a day  -- Indication: For UC (ulcerative colitis)    sulfaSALAzine 500 mg oral tablet  -- 1 tab(s) by mouth once a day (at bedtime)  -- Indication: For UC (ulcerative colitis)    lisinopril 40 mg oral tablet  -- 1 tab(s) by mouth once a day  -- Indication: For HTN (hypertension)    KlonoPIN 0.5 mg oral tablet  -- 1 tab(s) by mouth once a day (at bedtime)  -- Indication: For Parkinson disease    atorvastatin 20 mg oral tablet  -- 1 tab(s) by mouth once a day  -- Indication: For Hyperlipidemia    carbidopa-levodopa 25 mg-100 mg oral tablet  -- 1 tab(s) by mouth 3 times a day  -- Indication: For Parkinson disease    Aggrenox 25 mg-200 mg oral capsule, extended release  -- 1 cap(s) by mouth 2 times a day, resume 4/22/18  -- Indication: For TIA (transient ischemic attack)    amLODIPine 5 mg oral tablet  -- 1 tab(s) by mouth once a day  -- Indication: For Hypertension    cephalexin 500 mg oral capsule  -- 1 cap(s) by mouth every 12 hours for 4 days  -- Indication: For wound    Colace 100 mg oral capsule  -- 1 cap(s) by mouth 2 times a day  -- Indication: For Constipation    latanoprost 0.005% ophthalmic solution  -- 1 drop(s) in each eye once a day (in the evening)  -- Indication: For Glaucoma    Timoptic Ocudose 0.25% ophthalmic solution  -- 1 drop(s) in each eye 2 times a day  -- Indication: For Glaucoma    brimonidine 0.2% ophthalmic solution  -- 1 drop(s) in the left eye 2 times a day  -- Indication: For Glaucoma    Acidophilus oral capsule  -- 1 cap(s) by mouth once a day  -- Indication: For Prophylactic

## 2018-04-19 NOTE — PROGRESS NOTE ADULT - SUBJECTIVE AND OBJECTIVE BOX
Patient is a 83y old  Male who presents with a chief complaint of Trauma (19 Apr 2018 10:43)      SUBJECTIVE / OVERNIGHT EVENTS:    MEDICATIONS  (STANDING):  allopurinol 100 milliGRAM(s) Oral two times a day  amLODIPine   Tablet 5 milliGRAM(s) Oral daily  atorvastatin 20 milliGRAM(s) Oral at bedtime  carbidopa/levodopa  25/100 1 Tablet(s) Oral three times a day  cephalexin 500 milliGRAM(s) Oral every 12 hours  heparin  Injectable 5000 Unit(s) SubCutaneous every 8 hours  latanoprost 0.005% Ophthalmic Solution 1 Drop(s) Both EYES at bedtime  lisinopril 40 milliGRAM(s) Oral daily  sulfaSALAzine 1000 milliGRAM(s) Oral daily  sulfaSALAzine 500 milliGRAM(s) Oral at bedtime  timolol 0.25% Solution 1 Drop(s) Both EYES two times a day    MEDICATIONS  (PRN):  ondansetron Injectable 4 milliGRAM(s) IV Push every 6 hours PRN Nausea      Vital Signs Last 24 Hrs  T(C): 36.7 (19 Apr 2018 09:09), Max: 37.2 (18 Apr 2018 21:14)  T(F): 98 (19 Apr 2018 09:09), Max: 98.9 (18 Apr 2018 21:14)  HR: 81 (19 Apr 2018 09:09) (60 - 92)  BP: 160/80 (19 Apr 2018 09:09) (150/83 - 177/100)  BP(mean): --  RR: 18 (19 Apr 2018 09:09) (18 - 18)  SpO2: 97% (19 Apr 2018 09:09) (94% - 97%)  CAPILLARY BLOOD GLUCOSE        I&O's Summary    18 Apr 2018 07:01  -  19 Apr 2018 07:00  --------------------------------------------------------  IN: 925 mL / OUT: 1450 mL / NET: -525 mL    19 Apr 2018 07:01  -  19 Apr 2018 12:38  --------------------------------------------------------  IN: 140 mL / OUT: 0 mL / NET: 140 mL        PHYSICAL EXAM:  GENERAL: NAD, well-developed  HEAD:  Atraumatic, Normocephalic  EYES: EOMI, PERRLA, conjunctiva and sclera clear  NECK: neck laceration cdi, stitches in place, ecchymosis R neck, mild ttp   CHEST/LUNG: Clear to auscultation bilaterally; No wheeze  HEART: Regular rate and rhythm; No murmurs, rubs, or gallops  ABDOMEN: Soft, Nontender, Nondistended; Bowel sounds present  EXTREMITIES:  2+ Peripheral Pulses, No clubbing, cyanosis, or edema  PSYCH: AAOx3  NEUROLOGY: non-focal  SKIN: No rashes or lesions    LABS:                        10.1   7.02  )-----------( 172      ( 19 Apr 2018 09:28 )             31.7     04-19    138  |  102  |  48<H>  ----------------------------<  173<H>  5.0   |  20<L>  |  1.94<H>    Ca    9.4      19 Apr 2018 07:14  Phos  3.7     04-19  Mg     2.0     04-19    TPro  7.5  /  Alb  4.1  /  TBili  0.5  /  DBili  x   /  AST  28  /  ALT  21  /  AlkPhos  115  04-17    PT/INR - ( 17 Apr 2018 13:33 )   PT: 10.9 sec;   INR: 1.00 ratio         PTT - ( 17 Apr 2018 13:33 )  PTT:42.1 sec        RADIOLOGY & ADDITIONAL TESTS:  Imaging Personally Reviewed:   Consultant(s) Notes Reviewed:    Care Discussed with Consultants/Other Providers: surgery

## 2018-04-19 NOTE — PROGRESS NOTE ADULT - SUBJECTIVE AND OBJECTIVE BOX
GENERAL SURGERY PROGRESS NOTE      SUBJECTIVE: Pt seen and examined at bedside. NICOLA o/n.  Denies N/V, fever, chills, SOB, CP.     Vital Signs Last 24 Hrs  T(C): 36.6 (19 Apr 2018 05:05), Max: 37.2 (18 Apr 2018 21:14)  T(F): 97.8 (19 Apr 2018 05:05), Max: 98.9 (18 Apr 2018 21:14)  HR: 67 (19 Apr 2018 05:05) (60 - 92)  BP: 160/78 (19 Apr 2018 05:05) (150/81 - 177/100)  BP(mean): --  RR: 18 (19 Apr 2018 05:05) (18 - 18)  SpO2: 94% (19 Apr 2018 05:05) (94% - 96%)    Physical Exam  General: awake, alert  Pulm: respirations unlabored, no increased WOB  Abdomen: Soft, nontender   Extremities: Grossly symmetric    I&O's Summary    17 Apr 2018 07:01  -  18 Apr 2018 07:00  --------------------------------------------------------  IN: 600 mL / OUT: 375 mL / NET: 225 mL    18 Apr 2018 07:01  -  19 Apr 2018 05:33  --------------------------------------------------------  IN: 925 mL / OUT: 1200 mL / NET: -275 mL      I&O's Detail    17 Apr 2018 07:01  -  18 Apr 2018 07:00  --------------------------------------------------------  IN:    lactated ringers.: 600 mL  Total IN: 600 mL    OUT:    Voided: 375 mL  Total OUT: 375 mL    Total NET: 225 mL      18 Apr 2018 07:01  -  19 Apr 2018 05:33  --------------------------------------------------------  IN:    IV PiggyBack: 50 mL    Oral Fluid: 875 mL  Total IN: 925 mL    OUT:    Voided: 1200 mL  Total OUT: 1200 mL    Total NET: -275 mL          MEDICATIONS  (STANDING):  allopurinol 100 milliGRAM(s) Oral two times a day  amLODIPine   Tablet 5 milliGRAM(s) Oral daily  atorvastatin 20 milliGRAM(s) Oral at bedtime  carbidopa/levodopa  25/100 1 Tablet(s) Oral three times a day  cephalexin 500 milliGRAM(s) Oral every 12 hours  heparin  Injectable 5000 Unit(s) SubCutaneous every 8 hours  latanoprost 0.005% Ophthalmic Solution 1 Drop(s) Both EYES at bedtime  lisinopril 40 milliGRAM(s) Oral daily  sulfaSALAzine 1000 milliGRAM(s) Oral daily  sulfaSALAzine 500 milliGRAM(s) Oral at bedtime  timolol 0.25% Solution 1 Drop(s) Both EYES two times a day    MEDICATIONS  (PRN):  ondansetron Injectable 4 milliGRAM(s) IV Push every 6 hours PRN Nausea      LABS:                        10.1   5.86  )-----------( 156      ( 18 Apr 2018 09:16 )             31.3     04-18    142  |  106  |  38<H>  ----------------------------<  116<H>  4.8   |  21<L>  |  2.04<H>    Ca    9.2      18 Apr 2018 07:19    TPro  7.5  /  Alb  4.1  /  TBili  0.5  /  DBili  x   /  AST  28  /  ALT  21  /  AlkPhos  115  04-17    PT/INR - ( 17 Apr 2018 13:33 )   PT: 10.9 sec;   INR: 1.00 ratio         PTT - ( 17 Apr 2018 13:33 )  PTT:42.1 sec      RADIOLOGY & ADDITIONAL STUDIES:

## 2018-04-19 NOTE — DISCHARGE NOTE ADULT - PLAN OF CARE
Outpatient follow up. Please follow up with plastic surgeon Dr. Lord in 1-2 weeks. Contact info below.   Please only follow up with your trauma surgery doctor Dr. Rojas if needed at [address] [place] [address] [address] [place] [address] #[phone]. Continue home medications and follow up with your primary care doctor. You may resume Aggrenox 4/22/18.

## 2018-04-19 NOTE — PROGRESS NOTE ADULT - ASSESSMENT
Assessment:    83y Male s/p fall with thyroid cartilage sprain    Plan:  -DVT PPX  -Pain control   -OOB as tolerated with assistance   -Put back on home medication   -Follow up PT   -Follow up Recommendations as per hospitalist    -Dispo Planning     SPIKE Chan [phone] Assessment:    83y Male s/p fall with thyroid cartilage sprain    Plan:  -DVT PPX  -Pain control   -OOB as tolerated with assistance   -Switched to PO keflex 500 BID  -Follow up Recommendations as per hospitalist    -PT [place] eiwng to assisted living    SPIKE Chan [phone]

## 2018-04-19 NOTE — DISCHARGE NOTE ADULT - CARE PLAN
Principal Discharge DX:	Closed fracture of thyroid cartilage, initial encounter  Goal:	Outpatient follow up.  Assessment and plan of treatment:	Please follow up with plastic surgeon Dr. Lord in 1-2 weeks. Contact info below.   Please only follow up with your trauma surgery doctor Dr. Rojas if needed at [address] [place] [address] #[phone].  Secondary Diagnosis:	TIA (transient ischemic attack)  Goal:	Continue home medications and follow up with your primary care doctor.  Assessment and plan of treatment:	You may resume Aggrenox 4/22/18.  Secondary Diagnosis:	UC (ulcerative colitis)  Secondary Diagnosis:	HTN (hypertension)  Secondary Diagnosis:	Gout
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
## 2018-04-19 NOTE — DISCHARGE NOTE ADULT - CARE PROVIDERS DIRECT ADDRESSES
,DirectAddress_Unknown,DirectAddress_Unknown,[email or web address]

## 2018-04-19 NOTE — DISCHARGE NOTE ADULT - INSTRUCTIONS
Regular diet  Activity as tolerated  You may shower, but please do not soak your wounds in a bath or pool. Pat Dry wound.   Sutures to be removed next week as outpatient with plastic surgeon  NOTIFY YOUR SURGEON IF: You have any bleeding that does not stop, any pus draining from your wound, any fever (over 100.4 F) or chills, or if your pain is not controlled.

## 2019-07-15 ENCOUNTER — INPATIENT (INPATIENT)
Facility: HOSPITAL | Age: 84
LOS: 15 days | Discharge: NURSING, REHAB OR LONG-TERM CARE | DRG: 377 | End: 2019-07-31
Attending: INTERNAL MEDICINE | Admitting: INTERNAL MEDICINE
Payer: MEDICARE

## 2019-07-15 VITALS
OXYGEN SATURATION: 98 % | WEIGHT: 160.06 LBS | SYSTOLIC BLOOD PRESSURE: 122 MMHG | RESPIRATION RATE: 18 BRPM | DIASTOLIC BLOOD PRESSURE: 66 MMHG | TEMPERATURE: 98 F | HEART RATE: 80 BPM | HEIGHT: 67 IN

## 2019-07-15 DIAGNOSIS — D53.1 OTHER MEGALOBLASTIC ANEMIAS, NOT ELSEWHERE CLASSIFIED: ICD-10-CM

## 2019-07-15 DIAGNOSIS — I10 ESSENTIAL (PRIMARY) HYPERTENSION: ICD-10-CM

## 2019-07-15 DIAGNOSIS — K51.90 ULCERATIVE COLITIS, UNSPECIFIED, WITHOUT COMPLICATIONS: ICD-10-CM

## 2019-07-15 DIAGNOSIS — K92.2 GASTROINTESTINAL HEMORRHAGE, UNSPECIFIED: ICD-10-CM

## 2019-07-15 DIAGNOSIS — D64.9 ANEMIA, UNSPECIFIED: ICD-10-CM

## 2019-07-15 DIAGNOSIS — R74.8 ABNORMAL LEVELS OF OTHER SERUM ENZYMES: ICD-10-CM

## 2019-07-15 DIAGNOSIS — M10.9 GOUT, UNSPECIFIED: ICD-10-CM

## 2019-07-15 DIAGNOSIS — K62.5 HEMORRHAGE OF ANUS AND RECTUM: ICD-10-CM

## 2019-07-15 DIAGNOSIS — N18.9 CHRONIC KIDNEY DISEASE, UNSPECIFIED: ICD-10-CM

## 2019-07-15 DIAGNOSIS — Z29.9 ENCOUNTER FOR PROPHYLACTIC MEASURES, UNSPECIFIED: ICD-10-CM

## 2019-07-15 LAB
ALBUMIN SERPL ELPH-MCNC: 3.6 G/DL — SIGNIFICANT CHANGE UP (ref 3.3–5)
ALP SERPL-CCNC: 121 U/L — HIGH (ref 30–120)
ALT FLD-CCNC: 22 U/L DA — SIGNIFICANT CHANGE UP (ref 10–60)
ANION GAP SERPL CALC-SCNC: 12 MMOL/L — SIGNIFICANT CHANGE UP (ref 5–17)
APPEARANCE UR: CLEAR — SIGNIFICANT CHANGE UP
APTT BLD: 33.2 SEC — SIGNIFICANT CHANGE UP (ref 28.5–37)
AST SERPL-CCNC: 50 U/L — HIGH (ref 10–40)
BACTERIA # UR AUTO: ABNORMAL
BASOPHILS # BLD AUTO: 0.01 K/UL — SIGNIFICANT CHANGE UP (ref 0–0.2)
BASOPHILS NFR BLD AUTO: 0.1 % — SIGNIFICANT CHANGE UP (ref 0–2)
BILIRUB SERPL-MCNC: 0.3 MG/DL — SIGNIFICANT CHANGE UP (ref 0.2–1.2)
BILIRUB UR-MCNC: NEGATIVE — SIGNIFICANT CHANGE UP
BUN SERPL-MCNC: 71 MG/DL — HIGH (ref 7–23)
CALCIUM SERPL-MCNC: 8.9 MG/DL — SIGNIFICANT CHANGE UP (ref 8.4–10.5)
CHLORIDE SERPL-SCNC: 107 MMOL/L — SIGNIFICANT CHANGE UP (ref 96–108)
CK MB BLD-MCNC: 9.6 % — HIGH (ref 0–3.5)
CK MB CFR SERPL CALC: 18.2 NG/ML — HIGH (ref 0–3.6)
CK SERPL-CCNC: 189 U/L — SIGNIFICANT CHANGE UP (ref 39–308)
CO2 SERPL-SCNC: 21 MMOL/L — LOW (ref 22–31)
COLOR SPEC: YELLOW — SIGNIFICANT CHANGE UP
CREAT SERPL-MCNC: 2.7 MG/DL — HIGH (ref 0.5–1.3)
DIFF PNL FLD: NEGATIVE — SIGNIFICANT CHANGE UP
EOSINOPHIL # BLD AUTO: 0.09 K/UL — SIGNIFICANT CHANGE UP (ref 0–0.5)
EOSINOPHIL NFR BLD AUTO: 0.9 % — SIGNIFICANT CHANGE UP (ref 0–6)
GLUCOSE SERPL-MCNC: 166 MG/DL — HIGH (ref 70–99)
GLUCOSE UR QL: NEGATIVE MG/DL — SIGNIFICANT CHANGE UP
HCT VFR BLD CALC: 20.9 % — CRITICAL LOW (ref 39–50)
HCT VFR BLD CALC: 23.9 % — LOW (ref 39–50)
HGB BLD-MCNC: 6.7 G/DL — CRITICAL LOW (ref 13–17)
HGB BLD-MCNC: 7.6 G/DL — LOW (ref 13–17)
IMM GRANULOCYTES NFR BLD AUTO: 1.2 % — SIGNIFICANT CHANGE UP (ref 0–1.5)
INR BLD: 1.11 RATIO — SIGNIFICANT CHANGE UP (ref 0.88–1.16)
KETONES UR-MCNC: NEGATIVE — SIGNIFICANT CHANGE UP
LEUKOCYTE ESTERASE UR-ACNC: NEGATIVE — SIGNIFICANT CHANGE UP
LYMPHOCYTES # BLD AUTO: 0.99 K/UL — LOW (ref 1–3.3)
LYMPHOCYTES # BLD AUTO: 9.6 % — LOW (ref 13–44)
MAGNESIUM SERPL-MCNC: 1.8 MG/DL — SIGNIFICANT CHANGE UP (ref 1.6–2.6)
MCHC RBC-ENTMCNC: 32.1 GM/DL — SIGNIFICANT CHANGE UP (ref 32–36)
MCHC RBC-ENTMCNC: 33 PG — SIGNIFICANT CHANGE UP (ref 27–34)
MCV RBC AUTO: 103 FL — HIGH (ref 80–100)
MONOCYTES # BLD AUTO: 0.69 K/UL — SIGNIFICANT CHANGE UP (ref 0–0.9)
MONOCYTES NFR BLD AUTO: 6.7 % — SIGNIFICANT CHANGE UP (ref 2–14)
NEUTROPHILS # BLD AUTO: 8.39 K/UL — HIGH (ref 1.8–7.4)
NEUTROPHILS NFR BLD AUTO: 81.5 % — HIGH (ref 43–77)
NITRITE UR-MCNC: NEGATIVE — SIGNIFICANT CHANGE UP
NRBC # BLD: 0 /100 WBCS — SIGNIFICANT CHANGE UP (ref 0–0)
NT-PROBNP SERPL-SCNC: 6715 PG/ML — HIGH (ref 0–450)
OB PNL STL: POSITIVE
PH UR: 5 — SIGNIFICANT CHANGE UP (ref 5–8)
PLATELET # BLD AUTO: 206 K/UL — SIGNIFICANT CHANGE UP (ref 150–400)
POTASSIUM SERPL-MCNC: 3.5 MMOL/L — SIGNIFICANT CHANGE UP (ref 3.5–5.3)
POTASSIUM SERPL-SCNC: 3.5 MMOL/L — SIGNIFICANT CHANGE UP (ref 3.5–5.3)
PROT SERPL-MCNC: 6.9 G/DL — SIGNIFICANT CHANGE UP (ref 6–8.3)
PROT UR-MCNC: 30 MG/DL
PROTHROM AB SERPL-ACNC: 12.1 SEC — SIGNIFICANT CHANGE UP (ref 10–12.9)
RBC # BLD: 2.03 M/UL — LOW (ref 4.2–5.8)
RBC # FLD: 14.6 % — HIGH (ref 10.3–14.5)
SODIUM SERPL-SCNC: 140 MMOL/L — SIGNIFICANT CHANGE UP (ref 135–145)
SP GR SPEC: 1.01 — SIGNIFICANT CHANGE UP (ref 1.01–1.02)
TROPONIN I SERPL-MCNC: 4.26 NG/ML — HIGH (ref 0.02–0.06)
TROPONIN I SERPL-MCNC: 5.77 NG/ML — HIGH (ref 0.02–0.06)
TROPONIN I SERPL-MCNC: 8.21 NG/ML — HIGH (ref 0.02–0.06)
UROBILINOGEN FLD QL: NEGATIVE MG/DL — SIGNIFICANT CHANGE UP
WBC # BLD: 10.29 K/UL — SIGNIFICANT CHANGE UP (ref 3.8–10.5)
WBC # FLD AUTO: 10.29 K/UL — SIGNIFICANT CHANGE UP (ref 3.8–10.5)
WBC UR QL: SIGNIFICANT CHANGE UP

## 2019-07-15 PROCEDURE — 93010 ELECTROCARDIOGRAM REPORT: CPT

## 2019-07-15 PROCEDURE — 93306 TTE W/DOPPLER COMPLETE: CPT | Mod: 26

## 2019-07-15 PROCEDURE — 71045 X-RAY EXAM CHEST 1 VIEW: CPT | Mod: 26

## 2019-07-15 RX ORDER — SODIUM CHLORIDE 9 MG/ML
1000 INJECTION INTRAMUSCULAR; INTRAVENOUS; SUBCUTANEOUS
Refills: 0 | Status: DISCONTINUED | OUTPATIENT
Start: 2019-07-15 | End: 2019-07-15

## 2019-07-15 RX ORDER — DIPHENHYDRAMINE HCL 50 MG
25 CAPSULE ORAL ONCE
Refills: 0 | Status: COMPLETED | OUTPATIENT
Start: 2019-07-15 | End: 2019-07-15

## 2019-07-15 RX ORDER — SODIUM CHLORIDE 9 MG/ML
1000 INJECTION, SOLUTION INTRAVENOUS
Refills: 0 | Status: DISCONTINUED | OUTPATIENT
Start: 2019-07-15 | End: 2019-07-18

## 2019-07-15 RX ORDER — LATANOPROST 0.05 MG/ML
1 SOLUTION/ DROPS OPHTHALMIC; TOPICAL AT BEDTIME
Refills: 0 | Status: DISCONTINUED | OUTPATIENT
Start: 2019-07-15 | End: 2019-07-18

## 2019-07-15 RX ORDER — DOCUSATE SODIUM 100 MG
100 CAPSULE ORAL
Refills: 0 | Status: DISCONTINUED | OUTPATIENT
Start: 2019-07-15 | End: 2019-07-18

## 2019-07-15 RX ORDER — ATORVASTATIN CALCIUM 80 MG/1
20 TABLET, FILM COATED ORAL AT BEDTIME
Refills: 0 | Status: DISCONTINUED | OUTPATIENT
Start: 2019-07-15 | End: 2019-07-18

## 2019-07-15 RX ORDER — TIMOLOL 0.5 %
1 DROPS OPHTHALMIC (EYE)
Refills: 0 | Status: DISCONTINUED | OUTPATIENT
Start: 2019-07-15 | End: 2019-07-18

## 2019-07-15 RX ORDER — ACETAMINOPHEN 500 MG
650 TABLET ORAL ONCE
Refills: 0 | Status: COMPLETED | OUTPATIENT
Start: 2019-07-15 | End: 2019-07-15

## 2019-07-15 RX ORDER — SULFASALAZINE 500 MG
500 TABLET ORAL
Refills: 0 | Status: DISCONTINUED | OUTPATIENT
Start: 2019-07-15 | End: 2019-07-18

## 2019-07-15 RX ORDER — LANOLIN ALCOHOL/MO/W.PET/CERES
3 CREAM (GRAM) TOPICAL AT BEDTIME
Refills: 0 | Status: DISCONTINUED | OUTPATIENT
Start: 2019-07-15 | End: 2019-07-18

## 2019-07-15 RX ORDER — CLONAZEPAM 1 MG
0.5 TABLET ORAL AT BEDTIME
Refills: 0 | Status: DISCONTINUED | OUTPATIENT
Start: 2019-07-15 | End: 2019-07-18

## 2019-07-15 RX ORDER — LACTOBACILLUS ACIDOPHILUS 100MM CELL
1 CAPSULE ORAL DAILY
Refills: 0 | Status: DISCONTINUED | OUTPATIENT
Start: 2019-07-15 | End: 2019-07-18

## 2019-07-15 RX ORDER — CARBIDOPA AND LEVODOPA 25; 100 MG/1; MG/1
1 TABLET ORAL THREE TIMES A DAY
Refills: 0 | Status: DISCONTINUED | OUTPATIENT
Start: 2019-07-15 | End: 2019-07-18

## 2019-07-15 RX ORDER — PANTOPRAZOLE SODIUM 20 MG/1
40 TABLET, DELAYED RELEASE ORAL EVERY 12 HOURS
Refills: 0 | Status: DISCONTINUED | OUTPATIENT
Start: 2019-07-15 | End: 2019-07-18

## 2019-07-15 RX ORDER — ACETAMINOPHEN 500 MG
650 TABLET ORAL EVERY 6 HOURS
Refills: 0 | Status: DISCONTINUED | OUTPATIENT
Start: 2019-07-15 | End: 2019-07-18

## 2019-07-15 RX ORDER — ACETAMINOPHEN 500 MG
650 TABLET ORAL ONCE
Refills: 0 | Status: DISCONTINUED | OUTPATIENT
Start: 2019-07-15 | End: 2019-07-15

## 2019-07-15 RX ORDER — DIPHENHYDRAMINE HCL 50 MG
25 CAPSULE ORAL ONCE
Refills: 0 | Status: DISCONTINUED | OUTPATIENT
Start: 2019-07-15 | End: 2019-07-15

## 2019-07-15 RX ADMIN — PANTOPRAZOLE SODIUM 40 MILLIGRAM(S): 20 TABLET, DELAYED RELEASE ORAL at 17:59

## 2019-07-15 RX ADMIN — ATORVASTATIN CALCIUM 20 MILLIGRAM(S): 80 TABLET, FILM COATED ORAL at 21:25

## 2019-07-15 RX ADMIN — Medication 1 DROP(S): at 18:00

## 2019-07-15 RX ADMIN — CARBIDOPA AND LEVODOPA 1 TABLET(S): 25; 100 TABLET ORAL at 14:30

## 2019-07-15 RX ADMIN — Medication 25 MILLIGRAM(S): at 17:58

## 2019-07-15 RX ADMIN — LATANOPROST 1 DROP(S): 0.05 SOLUTION/ DROPS OPHTHALMIC; TOPICAL at 21:25

## 2019-07-15 RX ADMIN — Medication 100 MILLIGRAM(S): at 18:00

## 2019-07-15 RX ADMIN — Medication 650 MILLIGRAM(S): at 17:59

## 2019-07-15 RX ADMIN — Medication 650 MILLIGRAM(S): at 12:06

## 2019-07-15 RX ADMIN — Medication 325 MILLIGRAM(S): at 10:58

## 2019-07-15 RX ADMIN — Medication 1 TABLET(S): at 12:09

## 2019-07-15 RX ADMIN — SODIUM CHLORIDE 75 MILLILITER(S): 9 INJECTION INTRAMUSCULAR; INTRAVENOUS; SUBCUTANEOUS at 08:00

## 2019-07-15 RX ADMIN — PANTOPRAZOLE SODIUM 40 MILLIGRAM(S): 20 TABLET, DELAYED RELEASE ORAL at 10:59

## 2019-07-15 RX ADMIN — Medication 0.5 MILLIGRAM(S): at 21:25

## 2019-07-15 RX ADMIN — CARBIDOPA AND LEVODOPA 1 TABLET(S): 25; 100 TABLET ORAL at 21:25

## 2019-07-15 RX ADMIN — Medication 650 MILLIGRAM(S): at 18:25

## 2019-07-15 RX ADMIN — Medication 500 MILLIGRAM(S): at 18:05

## 2019-07-15 RX ADMIN — SODIUM CHLORIDE 50 MILLILITER(S): 9 INJECTION, SOLUTION INTRAVENOUS at 18:06

## 2019-07-15 NOTE — ED PROVIDER NOTE - OBJECTIVE STATEMENT
85 yo male from [facility] brought for eval of SOB and black stools for several days. Hx of TIA on Aggrenox. Denies CP, NVDC, fever, abd pain.   PMD [place]ard.

## 2019-07-15 NOTE — PROVIDER CONTACT NOTE (CRITICAL VALUE NOTIFICATION) - BACKGROUND
initially presented with chest pain, gi bleed secondary to colitis receiving 1st PRBC transfusion, sob

## 2019-07-15 NOTE — CONSULT NOTE ADULT - SUBJECTIVE AND OBJECTIVE BOX
History of Present Illness: The patient is an 84 year old male with a history of HTN, UC, TIAs, Parkinson's who presents with multiple complaints. He has noted black stool over the past 2-3 days. Over the prior couple of weeks, he has felt fatigued, short of breath and chest discomfort with exertion. No recent cardiac testing. No known cardiac issues although as per son has had multiple TIAs in the past.    Past Medical/Surgical History:  HTN, UC, TIAs, Parkinson's    Medications:  Home Medications:  Acidophilus oral capsule: 1 cap(s) orally once a day (15 Jul 2019 06:49)  Aggrenox 25 mg-200 mg oral capsule, extended release: 1 cap(s) orally 2 times a day, resume 4/22/18 (15 Jul 2019 06:49)  atorvastatin 20 mg oral tablet: 1 tab(s) orally once a day (15 Jul 2019 06:49)  carbidopa-levodopa 25 mg-100 mg oral tablet: 1 tab(s) orally 3 times a day (15 Jul 2019 06:49)  Colace 100 mg oral capsule: 1 cap(s) orally 2 times a day (15 Jul 2019 06:49)  KlonoPIN 0.5 mg oral tablet: 1 tab(s) orally once a day (at bedtime) (15 Jul 2019 06:49)  latanoprost 0.005% ophthalmic solution: 1 drop(s) in each eye once a day (in the evening) (15 Jul 2019 06:49)  lisinopril 40 mg oral tablet: 1 tab(s) orally once a day (15 Jul 2019 06:49)  Multi-Day Plus Minerals oral tablet: 1 tab(s) orally once a day (15 Jul 2019 06:49)  sulfaSALAzine 500 mg oral tablet: 1 tab(s) orally once a day (at bedtime) (15 Jul 2019 06:49)  sulfaSALAzine 500 mg oral tablet: 2 tab(s) orally once a day (15 Jul 2019 06:49)  Timoptic Ocudose 0.25% ophthalmic solution: 1 drop(s) in each eye 2 times a day (15 Jul 2019 06:49)  Viagra 100 mg oral tablet: 1 tab(s) orally once a day (15 Jul 2019 06:49)      Family History: Non-contributory family history of premature cardiovascular atherosclerotic disease    Social History: No tobacco, alcohol or drug use    Review of Systems:  General: No fevers, chills, weight loss or gain  Skin: No rashes, color changes  Cardiovascular: (+) chest pain, orthopnea  Respiratory: (+) shortness of breath, cough  Gastrointestinal: No nausea, abdominal pain  Genitourinary: No incontinence, pain with urination  Musculoskeletal: No pain, swelling, decreased range of motion  Neurological: No headache, weakness  Psychiatric: No depression, anxiety  Endocrine: No weight loss or gain, increased thirst  All other systems are comprehensively negative.    Physical Exam:  Vitals:        Vital Signs Last 24 Hrs  T(C): 36.7 (15 Jul 2019 06:38), Max: 36.7 (15 Jul 2019 06:38)  T(F): 98 (15 Jul 2019 06:38), Max: 98 (15 Jul 2019 06:38)  HR: 81 (15 Jul 2019 06:51) (80 - 81)  BP: 122/62 (15 Jul 2019 06:51) (122/62 - 122/66)  BP(mean): --  RR: 16 (15 Jul 2019 06:51) (16 - 18)  SpO2: 98% (15 Jul 2019 06:51) (98% - 98%)  General: NAD  HEENT: MMM  Neck: No JVD, no carotid bruit  Lungs: CTAB  CV: RRR, nl S1/S2, no M/R/G  Abdomen: S/NT/ND, +BS  Extremities: No LE edema, no cyanosis  Neuro: AAOx3, non-focal  Skin: No rash    Labs:                        6.7    10.29 )-----------( 206      ( 15 Jul 2019 06:58 )             20.9     07-15    140  |  107  |  71<H>  ----------------------------<  166<H>  3.5   |  21<L>  |  2.70<H>    Ca    8.9      15 Jul 2019 06:58  Mg     1.8     07-15    TPro  6.9  /  Alb  3.6  /  TBili  0.3  /  DBili  x   /  AST  50<H>  /  ALT  22  /  AlkPhos  121<H>  07-15    CARDIAC MARKERS ( 15 Jul 2019 06:58 )  4.260 ng/mL / x     / 189 U/L / x     / 18.2 ng/mL      PT/INR - ( 15 Jul 2019 06:58 )   PT: 12.1 sec;   INR: 1.11 ratio         PTT - ( 15 Jul 2019 06:58 )  PTT:33.2 sec    ECG: NSR, normal axis, incomplete RBBB, anterior ST depressions

## 2019-07-15 NOTE — ED PROVIDER NOTE - CONSTITUTIONAL, MLM
normal... Illl appearing, well nourished, awake, alert, oriented to person, place, time/situation and in no apparent distress.

## 2019-07-15 NOTE — H&P ADULT - ATTENDING COMMENTS
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 Found to have anemia and admitted for hemotransfusion and GI workup .Patient had colonoscopy 3 years ago and [place] and had some polyps removed ,he denies any recent hx of GIB or GI workup ,but admits black stools and lightheadedness ,sob ,worsening last few days .Found to have RANJANA elevation and seen by cardiologist Admitted  to telemetry unit for monitoring , send 3 sets of cardiac ensymes to rule out coronary event, obtain ECHO to evaluate LVEF, cardiology consult ,continue current management, O2 supply, anticoagulation plan as per cardiology  ,aggrenox placed on hold until GI clearance will be obtained Nephrology cons called ,IV hydration is administrated ,lisinopril held as per cardiologist recommendation .

## 2019-07-15 NOTE — H&P ADULT - PROBLEM SELECTOR PLAN 5
serial bmp ,ins/outs ,nephrology eval called ,avoid nephrotoxins hold lisinopril due to pb , continue current management and present  medications ,seen by cardiologist

## 2019-07-15 NOTE — ED ADULT NURSE NOTE - PMH
Acoustic neuroma    Chronic kidney disease    Gout    HTN (hypertension)    HTN (hypertension)    UC (ulcerative colitis)    UC (ulcerative colitis)

## 2019-07-15 NOTE — CONSULT NOTE ADULT - SUBJECTIVE AND OBJECTIVE BOX
Patient is a 84y Male whom presented to the hospital with     PAST MEDICAL & SURGICAL HISTORY:  UC (ulcerative colitis)  HTN (hypertension)  UC (ulcerative colitis)  HTN (hypertension)  Gout  Chronic kidney disease  Acoustic neuroma  No significant past surgical history  No significant past surgical history      MEDICATIONS  (STANDING):  atorvastatin 20 milliGRAM(s) Oral at bedtime  carbidopa/levodopa  25/100 1 Tablet(s) Oral three times a day  clonazePAM  Tablet 0.5 milliGRAM(s) Oral at bedtime  docusate sodium 100 milliGRAM(s) Oral two times a day  lactobacillus acidophilus 1 Tablet(s) Oral daily  latanoprost 0.005% Ophthalmic Solution 1 Drop(s) Both EYES at bedtime  multivitamin 1 Tablet(s) Oral daily  pantoprazole  Injectable 40 milliGRAM(s) IV Push every 12 hours  sodium chloride 0.9%. 1000 milliLiter(s) (75 mL/Hr) IV Continuous <Continuous>  sulfaSALAzine 500 milliGRAM(s) Oral two times a day  timolol 0.25% Solution 1 Drop(s) Both EYES two times a day      Allergies    No Known Allergies    Intolerances        SOCIAL HISTORY:  Denies ETOh,Smoking,     FAMILY HISTORY:      REVIEW OF SYSTEMS:    CONSTITUTIONAL: No weakness, fevers or chills  EYES/ENT: No visual changes;  no throat pain   NECK: No pain or stiffness  RESPIRATORY: No cough, wheezing, hemoptysis; No shortness of breath  CARDIOVASCULAR: No chest pain or palpitations  GASTROINTESTINAL: No abdominal or epigastric pain. No nausea, vomiting,     No diarrhea or constipation. No melena   GENITOURINARY: No dysuria, frequency or hematuria  NEUROLOGICAL: No numbness or weakness  SKIN: dry      VITAL:  T(C): , Max: 36.8 (07-15-19 @ 14:09)  T(F): , Max: 98.3 (07-15-19 @ 14:09)  HR: 68 (07-15-19 @ 14:09)  BP: 130/56 (07-15-19 @ 14:09)  BP(mean): --  RR: 16 (07-15-19 @ 14:09)  SpO2: 99% (07-15-19 @ 14:09)  Wt(kg): --    I and O's:    Height (cm): 170.18 (07-15 @ 06:38)  Weight (kg): 72.6 (07-15 @ 06:38)  BMI (kg/m2): 25.1 (07-15 @ 06:38)  BSA (m2): 1.84 (07-15 @ 06:38)    PHYSICAL EXAM:    Constitutional: NAD  HEENT: conjunctive   clear   Neck:  No JVD  Respiratory: CTAB  Cardiovascular: S1 and S2  Gastrointestinal: BS+, soft, NT/ND  Extremities: No peripheral edema  Neurological: A/O x 3, no focal deficits  Psychiatric: Normal mood, normal affect  : No Ruiz  Skin: No rashes  Access: Not applicable    LABS:                        6.7    10.29 )-----------( 206      ( 15 Jul 2019 06:58 )             20.9     07-15    140  |  107  |  71<H>  ----------------------------<  166<H>  3.5   |  21<L>  |  2.70<H>    Ca    8.9      15 Jul 2019 06:58  Mg     1.8     07-15    TPro  6.9  /  Alb  3.6  /  TBili  0.3  /  DBili  x   /  AST  50<H>  /  ALT  22  /  AlkPhos  121<H>  07-15      Urine Studies:  Urinalysis Basic - ( 15 Jul 2019 08:13 )    Color: Yellow / Appearance: Clear / S.015 / pH: x  Gluc: x / Ketone: Negative  / Bili: Negative / Urobili: Negative mg/dL   Blood: x / Protein: 30 mg/dL / Nitrite: Negative   Leuk Esterase: Negative / RBC: x / WBC 0-2   Sq Epi: x / Non Sq Epi: x / Bacteria: Few            RADIOLOGY & ADDITIONAL STUDIES: Patient is a 84y Male whom presented to the hospital with esrd on hd     PAST MEDICAL & SURGICAL HISTORY:  UC (ulcerative colitis)  HTN (hypertension)  UC (ulcerative colitis)  HTN (hypertension)  Gout  Chronic kidney disease  Acoustic neuroma  No significant past surgical history  No significant past surgical history      MEDICATIONS  (STANDING):  atorvastatin 20 milliGRAM(s) Oral at bedtime  carbidopa/levodopa  25/100 1 Tablet(s) Oral three times a day  clonazePAM  Tablet 0.5 milliGRAM(s) Oral at bedtime  docusate sodium 100 milliGRAM(s) Oral two times a day  lactobacillus acidophilus 1 Tablet(s) Oral daily  latanoprost 0.005% Ophthalmic Solution 1 Drop(s) Both EYES at bedtime  multivitamin 1 Tablet(s) Oral daily  pantoprazole  Injectable 40 milliGRAM(s) IV Push every 12 hours  sodium chloride 0.9%. 1000 milliLiter(s) (75 mL/Hr) IV Continuous <Continuous>  sulfaSALAzine 500 milliGRAM(s) Oral two times a day  timolol 0.25% Solution 1 Drop(s) Both EYES two times a day      Allergies    No Known Allergies    Intolerances        SOCIAL HISTORY:  Denies ETOh,Smoking,     FAMILY HISTORY:      REVIEW OF SYSTEMS:    CONSTITUTIONAL: No weakness, fevers or chills  EYES/ENT: No visual changes;  no throat pain   NECK: No pain or stiffness  RESPIRATORY: No cough, wheezing, hemoptysis; pos  shortness of breath  CARDIOVASCULAR: No chest pain or palpitations  GASTROINTESTINAL: No abdominal or epigastric pain. No nausea, vomiting,     No diarrhea or constipation. pos  melena   GENITOURINARY: No dysuria, frequency or hematuria  NEUROLOGICAL: No numbness or weakness  SKIN: dry      VITAL:  T(C): , Max: 36.8 (07-15-19 @ 14:09)  T(F): , Max: 98.3 (07-15-19 @ 14:09)  HR: 68 (07-15-19 @ 14:09)  BP: 130/56 (07-15-19 @ 14:09)  BP(mean): --  RR: 16 (07-15-19 @ 14:09)  SpO2: 99% (07-15-19 @ 14:09)  Wt(kg): --    I and O's:    Height (cm): 170.18 (07-15 @ 06:38)  Weight (kg): 72.6 (07-15 @ 06:38)  BMI (kg/m2): 25.1 (07-15 @ 06:38)  BSA (m2): 1.84 (07-15 @ 06:38)    PHYSICAL EXAM:    Constitutional: NAD  HEENT: conjunctive   clear   Neck:  No JVD  Respiratory: CTAB  Cardiovascular: S1 and S2  Gastrointestinal: BS+, soft, NT/ND  Extremities: No peripheral edema  Neurological: A/O x 3, no focal deficits  Psychiatric: Normal mood, normal affect  : No Ruiz  Skin: No rashes  Access: Not applicable    LABS:                        6.7    10.29 )-----------( 206      ( 15 Jul 2019 06:58 )             20.9     07-15    140  |  107  |  71<H>  ----------------------------<  166<H>  3.5   |  21<L>  |  2.70<H>    Ca    8.9      15 Jul 2019 06:58  Mg     1.8     07-15    TPro  6.9  /  Alb  3.6  /  TBili  0.3  /  DBili  x   /  AST  50<H>  /  ALT  22  /  AlkPhos  121<H>  07-15      Urine Studies:  Urinalysis Basic - ( 15 Jul 2019 08:13 )    Color: Yellow / Appearance: Clear / S.015 / pH: x  Gluc: x / Ketone: Negative  / Bili: Negative / Urobili: Negative mg/dL   Blood: x / Protein: 30 mg/dL / Nitrite: Negative   Leuk Esterase: Negative / RBC: x / WBC 0-2   Sq Epi: x / Non Sq Epi: x / Bacteria: Few            RADIOLOGY & ADDITIONAL STUDIES: Patient is a 84y Male whom presented to the hospital with ckd and pb      PAST MEDICAL & SURGICAL HISTORY:  UC (ulcerative colitis)  HTN (hypertension)  UC (ulcerative colitis)  HTN (hypertension)  Gout  Chronic kidney disease  Acoustic neuroma  No significant past surgical history  No significant past surgical history      MEDICATIONS  (STANDING):  atorvastatin 20 milliGRAM(s) Oral at bedtime  carbidopa/levodopa  25/100 1 Tablet(s) Oral three times a day  clonazePAM  Tablet 0.5 milliGRAM(s) Oral at bedtime  docusate sodium 100 milliGRAM(s) Oral two times a day  lactobacillus acidophilus 1 Tablet(s) Oral daily  latanoprost 0.005% Ophthalmic Solution 1 Drop(s) Both EYES at bedtime  multivitamin 1 Tablet(s) Oral daily  pantoprazole  Injectable 40 milliGRAM(s) IV Push every 12 hours  sodium chloride 0.9%. 1000 milliLiter(s) (75 mL/Hr) IV Continuous <Continuous>  sulfaSALAzine 500 milliGRAM(s) Oral two times a day  timolol 0.25% Solution 1 Drop(s) Both EYES two times a day      Allergies    No Known Allergies    Intolerances        SOCIAL HISTORY:  Denies ETOh,Smoking,     FAMILY HISTORY:      REVIEW OF SYSTEMS:    CONSTITUTIONAL: No weakness, fevers or chills  EYES/ENT: No visual changes;  no throat pain   NECK: No pain or stiffness  RESPIRATORY: No cough, wheezing, hemoptysis; pos  shortness of breath  CARDIOVASCULAR: No chest pain or palpitations  GASTROINTESTINAL: No abdominal or epigastric pain. No nausea, vomiting,     No diarrhea or constipation. pos  melena   GENITOURINARY: No dysuria, frequency or hematuria  NEUROLOGICAL: No numbness or weakness  SKIN: dry      VITAL:  T(C): , Max: 36.8 (07-15-19 @ 14:09)  T(F): , Max: 98.3 (07-15-19 @ 14:09)  HR: 68 (07-15-19 @ 14:09)  BP: 130/56 (07-15-19 @ 14:09)  BP(mean): --  RR: 16 (07-15-19 @ 14:09)  SpO2: 99% (07-15-19 @ 14:09)  Wt(kg): --    I and O's:    Height (cm): 170.18 (07-15 @ 06:38)  Weight (kg): 72.6 (07-15 @ 06:38)  BMI (kg/m2): 25.1 (07-15 @ 06:38)  BSA (m2): 1.84 (07-15 @ 06:38)    PHYSICAL EXAM:    Constitutional: NAD  HEENT: conjunctive   clear   Neck:  No JVD  Respiratory: decrease bs b/l   Cardiovascular: S1 and S2  Gastrointestinal: BS+, soft, NT/ND  Extremities: trace  peripheral edema  Neurological:  no focal deficits  Psychiatric: Normal mood, normal affect  : No Ruiz  Skin: No rashes  Access: Not applicable    LABS:                        6.7    10.29 )-----------( 206      ( 15 Jul 2019 06:58 )             20.9     07-15    140  |  107  |  71<H>  ----------------------------<  166<H>  3.5   |  21<L>  |  2.70<H>    Ca    8.9      15 Jul 2019 06:58  Mg     1.8     07-15    TPro  6.9  /  Alb  3.6  /  TBili  0.3  /  DBili  x   /  AST  50<H>  /  ALT  22  /  AlkPhos  121<H>  07-15      Urine Studies:  Urinalysis Basic - ( 15 Jul 2019 08:13 )    Color: Yellow / Appearance: Clear / S.015 / pH: x  Gluc: x / Ketone: Negative  / Bili: Negative / Urobili: Negative mg/dL   Blood: x / Protein: 30 mg/dL / Nitrite: Negative   Leuk Esterase: Negative / RBC: x / WBC 0-2   Sq Epi: x / Non Sq Epi: x / Bacteria: Few            RADIOLOGY & ADDITIONAL STUDIES:

## 2019-07-15 NOTE — PROVIDER CONTACT NOTE (CRITICAL VALUE NOTIFICATION) - BACKGROUND
First tropinin 4. Admitted today for GI bleed. Cardiac hx HTN, ACS, Parkinsons. Followed by Dr. Mccrary. First troponin 4.260, 2nd 5.768. VSS. Hemo 6.7 in am . one UPRBC given. Second unit infusing.

## 2019-07-15 NOTE — H&P ADULT - NSICDXPASTMEDICALHX_GEN_ALL_CORE_FT
PAST MEDICAL HISTORY:  Acoustic neuroma     Chronic kidney disease     Gout     HTN (hypertension)     HTN (hypertension)     UC (ulcerative colitis)     UC (ulcerative colitis)

## 2019-07-15 NOTE — H&P ADULT - PROBLEM SELECTOR PLAN 6
Gastrointestinal stress ulcer prophylaxis and DVT prophylaxis administrated serial bmp ,ins/outs ,nephrology eval called ,avoid nephrotoxins

## 2019-07-15 NOTE — ED PROVIDER NOTE - CARE PLAN
Principal Discharge DX:	Rectal bleed  Secondary Diagnosis:	ACS (acute coronary syndrome)  Secondary Diagnosis:	Anemia

## 2019-07-15 NOTE — H&P ADULT - NSHPLABSRESULTS_GEN_ALL_CORE
< from: Xray Chest 1 View- PORTABLE-Urgent (07.15.19 @ 08:21) >    EXAM:  XR CHEST PORTABLE URGENT 1V                                  PROCEDURE DATE:  07/15/2019          INTERPRETATION:  Clinical information: Chest pain    Portable exam, 8:14 a.m.    No prior studies present for comparison.    Cardiac monitor leads overlie the chest    Mild prominence of interstitial markings at the lung bases. The diaphragm   is flattened, an emphysematous change. Trace blunting both costophrenic   angles. Heart size within normal limits. Hilar regions, mediastinal   contours intact. Aorta shows atherosclerotic changes. No acute osseous   abnormalities.    IMPRESSION: See above report    < end of copied text > < from: Xray Chest 1 View- PORTABLE-Urgent (07.15.19 @ 08:21) >  EXAM:  XR CHEST PORTABLE URGENT 1V                        PROCEDURE DATE:  07/15/2019    INTERPRETATION:  Clinical information: Chest pain  Portable exam, 8:14 a.m.  No prior studies present for comparison.  Cardiac monitor leads overlie the chest  Mild prominence of interstitial markings at the lung bases. The diaphragm   is flattened, an emphysematous change. Trace blunting both costophrenic   angles. Heart size within normal limits. Hilar regions, mediastinal   contours intact. Aorta shows atherosclerotic changes. No acute osseous   abnormalities.  IMPRESSION: See above report  < end of copied text >

## 2019-07-15 NOTE — H&P ADULT - NEGATIVE NEUROLOGICAL SYMPTOMS
no focal seizures/no tremors/no headache/no generalized seizures/no vertigo/no loss of consciousness/no syncope

## 2019-07-15 NOTE — H&P ADULT - NSHPSOCIALHISTORY_GEN_ALL_CORE
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

## 2019-07-15 NOTE — H&P ADULT - RS GEN PE MLT RESP DETAILS PC
breath sounds equal/diminished breath sounds, L/normal/good air movement/diminished breath sounds, R/airway patent/respirations non-labored

## 2019-07-15 NOTE — H&P ADULT - NEGATIVE GASTROINTESTINAL SYMPTOMS
no change in bowel habits/no diarrhea/no flatulence/no constipation/no vomiting/no nausea/no abdominal pain

## 2019-07-15 NOTE — PROVIDER CONTACT NOTE (CRITICAL VALUE NOTIFICATION) - RECOMMENDATIONS
transfer to cardiac center for ACS and potential cardiac intervention
Continue to monitor  on CM, VS Q4h.

## 2019-07-15 NOTE — H&P ADULT - PROBLEM SELECTOR PLAN 3
Admitted  to telemetry unit for monitoring , send 3 sets of cardiac ensymes to rule out coronary event, obtain ECHO to evaluate LVEF, cardiology consult ,continue current management, O2 supply, anticoagulation plan as per cardiology consult SERIAL CBC , PII BID , IV FLUIDS , GI CONSULT , HOLD AGGRENOX ,MONITOR H/H CLOSELY , SEND H/H AFTER 2 U OF PRBCS

## 2019-07-15 NOTE — H&P ADULT - NEGATIVE MUSCULOSKELETAL SYMPTOMS
no stiffness/no myalgia/no muscle cramps/no neck pain/no back pain/no joint swelling/no muscle weakness

## 2019-07-15 NOTE — H&P ADULT - PSYCHIATRIC
not applicable Affect and characteristics of appearance, verbalizations, behaviors are appropriate negative

## 2019-07-15 NOTE — ED PROVIDER NOTE - CHPI ED SYMPTOMS NEG
no chills/no vomiting/no numbness/no pain/no dizziness/no fever/no nausea/no tingling/no decreased eating/drinking

## 2019-07-15 NOTE — H&P ADULT - PROBLEM SELECTOR PLAN 4
hold lisinopril due to pb , continue current management and present  medications ,seen by cardiologist Admitted  to telemetry unit for monitoring , send 3 sets of cardiac ensymes to rule out coronary event, obtain ECHO to evaluate LVEF, cardiology consult ,continue current management, O2 supply, anticoagulation plan as per cardiology consult

## 2019-07-15 NOTE — PROVIDER CONTACT NOTE (CRITICAL VALUE NOTIFICATION) - ASSESSMENT
Patient reports he feels better since transfusion initiated.
VSS. NSR. Asympotmatic except for pale skin, weakness.

## 2019-07-15 NOTE — CONSULT NOTE ADULT - SUBJECTIVE AND OBJECTIVE BOX
Chief Complaint:  Patient is a 84y old  Male who presents with a chief complaint of SOB , anemia ,black stools (15 Jul 2019 08:57)    UC (ulcerative colitis)  HTN (hypertension)  UC (ulcerative colitis)  HTN (hypertension)  Gout  Chronic kidney disease  Acoustic neuroma  No significant past surgical history  No significant past surgical history     HPI:  85 yo male Acoustic neuroma ,[place] , ,Parkinson Dz, unsteady gait ,  Chronic kidney disease  ,Gout  ,HTN (hypertension)   ,UC (ulcerative colitis) , resident of [facility] brought for eval of SOB and black stools for several days. Found to have anemia and admitted for hemotransfusion and GI workup .Patient had colonoscopy 3 years ago and [place] and had some polyps removed ,he denies any recent hx of GIB or GI workup ,but admits black stools and lightheadedness ,sob ,worsening last few days .Found to have RANJANA elevation and seen by cardiologist Admitted  to telemetry unit for monitoring , send 3 sets of cardiac ensymes to rule out coronary event, obtain ECHO to evaluate LVEF, cardiology consult ,continue current management, O2 supply, anticoagulation plan as per cardiology  ,aggrenox placed on hold until GI clearance will be obtained Nephrology cons called ,IV hydration is administrated ,lisinopril held as per cardiologist recommendation .   d/w pt and son at bedside   elevated troponins and anemia w/ CRI-acutely worsening.   no diarrhea or abdominal pain and no documentation . pt is awake and alert    has been using aggrenox for several years, no nsaids per pt        No Known Allergies      acetaminophen   Tablet .. 650 milliGRAM(s) Oral every 6 hours PRN  acetaminophen   Tablet .. 650 milliGRAM(s) Oral once  atorvastatin 20 milliGRAM(s) Oral at bedtime  carbidopa/levodopa  25/100 1 Tablet(s) Oral three times a day  clonazePAM  Tablet 0.5 milliGRAM(s) Oral at bedtime  diphenhydrAMINE 25 milliGRAM(s) Oral once PRN  docusate sodium 100 milliGRAM(s) Oral two times a day  lactobacillus acidophilus 1 Tablet(s) Oral daily  latanoprost 0.005% Ophthalmic Solution 1 Drop(s) Both EYES at bedtime  multivitamin 1 Tablet(s) Oral daily  pantoprazole  Injectable 40 milliGRAM(s) IV Push every 12 hours  sodium chloride 0.9%. 1000 milliLiter(s) IV Continuous <Continuous>  sulfaSALAzine 500 milliGRAM(s) Oral two times a day  timolol 0.25% Solution 1 Drop(s) Both EYES two times a day        FAMILY HISTORY:        Review of Systems:    General:  No wt loss, fevers, chills, night sweats,fatigue,   Eyes:  Good vision, no reported pain  ENT:  No sore throat, pain, runny nose, dysphagia  CV:  No pain, palpitatioins, hypo/hypertension  Resp:  No dyspnea, cough, tachypnea, wheezing  GI:  No pain, No nausea, No vomiting, No diarrhea, No constipatiion, No weight loss, No fever, No pruritis, No rectal bleeding,++dark stools       , No dysphagia,  :  No pain, bleeding, incontinence, nocturia  Muscle:  No pain, weakness  Breast:  No pain, abscess, mass, discharge  Neuro:  No weakness, tingling, memory problems  Psych:  No fatigue, insomnia, mood problems, depression      Physical Exam:    Vital Signs:  Vital Signs Last 24 Hrs  T(C): 36.7 (15 Jul 2019 06:38), Max: 36.7 (15 Jul 2019 06:38)  T(F): 98 (15 Jul 2019 06:38), Max: 98 (15 Jul 2019 06:38)  HR: 81 (15 Jul 2019 06:51) (80 - 81)  BP: 122/62 (15 Jul 2019 06:51) (122/62 - 122/66)  BP(mean): --  RR: 16 (15 Jul 2019 06:51) (16 - 18)  SpO2: 98% (15 Jul 2019 06:51) (98% - 98%)  Daily Height in cm: 170.18 (15 Jul 2019 06:38)    Daily     General:  Appears stated age, well-groomed, well-nourished, no distress  HEENT:  NC/AT,  conjunctivae clear and pink, no thyromegaly, nodules, adenopathy, no JVD  Chest:  Full & symmetric excursion, no increased effort, breath sounds clear  Cardiovascular:  Regular rhythm, S1, S2, no murmur/rub/S3/S4, no abdominal bruit, no edema  Abdomen:  Soft, non-tender, non-distended, normoactive bowel sounds,  no masses ,no hepatosplenomeagaly, no signs of chronic liver disease  Extremities:  no cyanosis,clubbing or edema  Skin:  No rash/erythema/ecchymoses/petechiae/wounds/abscess/warm/dry  Neuro/Psych:  Alert, oriented, no asterixis, no tremor, no encephalopathy    Laboratory:                            6.7    10.29 )-----------( 206      ( 15 Jul 2019 06:58 )             20.9     -15    140  |  107  |  71<H>  ----------------------------<  166<H>  3.5   |  21<L>  |  2.70<H>    Ca    8.9      15 Jul 2019 06:58  Mg     1.8     07-15    TPro  6.9  /  Alb  3.6  /  TBili  0.3  /  DBili  x   /  AST  50<H>  /  ALT  22  /  AlkPhos  121<H>  07-15    LIVER FUNCTIONS - ( 15 Jul 2019 06:58 )  Alb: 3.6 g/dL / Pro: 6.9 g/dL / ALK PHOS: 121 U/L / ALT: 22 U/L DA / AST: 50 U/L / GGT: x           PT/INR - ( 15 Jul 2019 06:58 )   PT: 12.1 sec;   INR: 1.11 ratio         PTT - ( 15 Jul 2019 06:58 )  PTT:33.2 sec  Urinalysis Basic - ( 15 Jul 2019 08:13 )    Color: Yellow / Appearance: Clear / S.015 / pH: x  Gluc: x / Ketone: Negative  / Bili: Negative / Urobili: Negative mg/dL   Blood: x / Protein: 30 mg/dL / Nitrite: Negative   Leuk Esterase: Negative / RBC: x / WBC 0-2   Sq Epi: x / Non Sq Epi: x / Bacteria: Few        Imaging:      Assessment:      Plan:

## 2019-07-15 NOTE — ED ADULT NURSE REASSESSMENT NOTE - NS ED NURSE REASSESS COMMENT FT1
Blood transfusion completed.  No reaction noted.  Pt. states he "feels better".  Denies chest pain, no SOB.
Liquids tolerated well.  Blood transfusion infusing well via right arm access.  IV access benign.  Family at bedside.
Pt. resting in bed.  H & H 6.7/20.9  Dr. Askew aware.  PRBC ordered.  Pt. c/o dyspnea on exertion.  Assisted with movement.  Bedside visitor.

## 2019-07-15 NOTE — ED ADULT NURSE REASSESSMENT NOTE - NSIMPLEMENTINTERV_GEN_ALL_ED
Implemented All Fall with Harm Risk Interventions:  [place] to call system. Call bell, personal items and telephone within reach. Instruct patient to call for assistance. Room bathroom lighting operational. Non-slip footwear when patient is off stretcher. Physically safe environment: no spills, clutter or unnecessary equipment. Stretcher in lowest position, wheels locked, appropriate side rails in place. Provide visual cue, wrist band, yellow gown, etc. Monitor gait and stability. Monitor for mental status changes and reorient to person, place, and time. Review medications for side effects contributing to fall risk. Reinforce activity limits and safety measures with patient and family. Provide visual clues: red socks.

## 2019-07-15 NOTE — H&P ADULT - ASSESSMENT
85 yo male Acoustic neuroma ,[place] , ,Parkinson Dz, unsteady gait ,  Chronic kidney disease  ,Gout  ,HTN (hypertension)   ,UC (ulcerative colitis) , resident of [facility] brought for eval of SOB and black stools for several days. Found to have anemia and admitted for hemotransfusion and GI workup .Patient had colonoscopy 3 years ago and [place] and had some polyps removed ,he denies any recent hx of GIB or GI workup ,but admits black stools and lightheadedness ,sob ,worsening last few days .Found to have RANJANA elevation and seen by cardiologist Admitted  to telemetry unit for monitoring , send 3 sets of cardiac ensymes to rule out coronary event, obtain ECHO to evaluate LVEF, cardiology consult ,continue current management, O2 supply, anticoagulation plan as per cardiology  ,aggrenox placed on hold until GI clearance will be obtained Nephrology cons called ,IV hydration is administrated ,lisinopril held as per cardiologist recommendation .

## 2019-07-15 NOTE — H&P ADULT - PROBLEM SELECTOR PLAN 1
SERIAL H/H ,TRANSFUSE PRBS , ANEMIA WORKUP AND STOOL FOR FOBT ORDERED ,GI AND HEM CONS CALLED SERIAL H/H ,TRANSFUSE PRBS , ANEMIA WORKUP AND STOOL FOR FOBT ORDERED ,GI AND HEM CONS CALLED .HOLD AGGRENOX ,TELEMETRY MONITORING

## 2019-07-15 NOTE — H&P ADULT - NEGATIVE CARDIOVASCULAR SYMPTOMS
no palpitations/no dyspnea on exertion/no orthopnea/no claudication/no paroxysmal nocturnal dyspnea/no chest pain/no peripheral edema

## 2019-07-15 NOTE — H&P ADULT - HISTORY OF PRESENT ILLNESS
83 yo male Acoustic neuroma ,[place] , ,Parkinson Dz, unsteady gait ,  Chronic kidney disease  ,Gout  ,HTN (hypertension)   ,UC (ulcerative colitis) , resident of [facility] brought for eval of SOB and black stools for several days. Found to have anemia and admitted for hemotransfusion and GI workup .Patient had colonoscopy 3 years ago and [place] and had some polyps removed ,he denies any recent hx of GIB or GI workup ,but admits black stools and lightheadedness ,sob ,worsening last few days .Found to have RANJANA elevation and seen by cardiologist Admitted  to telemetry unit for monitoring , send 3 sets of cardiac ensymes to rule out coronary event, obtain ECHO to evaluate LVEF, cardiology consult ,continue current management, O2 supply, anticoagulation plan as per cardiology  ,aggrenox placed on hold until GI clearance will be obtained Nephrology cons called ,IV hydration is administrated ,lisinopril held as per cardiologist recommendation .

## 2019-07-16 LAB
ANION GAP SERPL CALC-SCNC: 13 MMOL/L — SIGNIFICANT CHANGE UP (ref 5–17)
BUN SERPL-MCNC: 52 MG/DL — HIGH (ref 7–23)
CALCIUM SERPL-MCNC: 8.4 MG/DL — SIGNIFICANT CHANGE UP (ref 8.4–10.5)
CHLORIDE SERPL-SCNC: 110 MMOL/L — HIGH (ref 96–108)
CO2 SERPL-SCNC: 20 MMOL/L — LOW (ref 22–31)
CREAT SERPL-MCNC: 2.31 MG/DL — HIGH (ref 0.5–1.3)
FERRITIN SERPL-MCNC: 62 NG/ML — SIGNIFICANT CHANGE UP (ref 30–400)
FOLATE SERPL-MCNC: >20 NG/ML — SIGNIFICANT CHANGE UP
GLUCOSE SERPL-MCNC: 136 MG/DL — HIGH (ref 70–99)
HCT VFR BLD CALC: 24.4 % — LOW (ref 39–50)
HCT VFR BLD CALC: 26.7 % — LOW (ref 39–50)
HCT VFR BLD CALC: 29.3 % — LOW (ref 39–50)
HGB BLD-MCNC: 8 G/DL — LOW (ref 13–17)
HGB BLD-MCNC: 8.6 G/DL — LOW (ref 13–17)
HGB BLD-MCNC: 9.7 G/DL — LOW (ref 13–17)
IRON SATN MFR SERPL: 27 UG/DL — LOW (ref 45–165)
IRON SATN MFR SERPL: 9 % — LOW (ref 16–55)
MCHC RBC-ENTMCNC: 31.7 PG — SIGNIFICANT CHANGE UP (ref 27–34)
MCHC RBC-ENTMCNC: 32.2 GM/DL — SIGNIFICANT CHANGE UP (ref 32–36)
MCV RBC AUTO: 98.5 FL — SIGNIFICANT CHANGE UP (ref 80–100)
NRBC # BLD: 0 /100 WBCS — SIGNIFICANT CHANGE UP (ref 0–0)
PLATELET # BLD AUTO: 170 K/UL — SIGNIFICANT CHANGE UP (ref 150–400)
POTASSIUM SERPL-MCNC: 3.6 MMOL/L — SIGNIFICANT CHANGE UP (ref 3.5–5.3)
POTASSIUM SERPL-SCNC: 3.6 MMOL/L — SIGNIFICANT CHANGE UP (ref 3.5–5.3)
RBC # BLD: 2.71 M/UL — LOW (ref 4.2–5.8)
RBC # BLD: 2.71 M/UL — LOW (ref 4.2–5.8)
RBC # FLD: 17.2 % — HIGH (ref 10.3–14.5)
RETICS #: 121.4 K/UL — SIGNIFICANT CHANGE UP (ref 25–125)
RETICS/RBC NFR: 4.5 % — HIGH (ref 0.5–2.5)
SODIUM SERPL-SCNC: 143 MMOL/L — SIGNIFICANT CHANGE UP (ref 135–145)
TIBC SERPL-MCNC: 306 UG/DL — SIGNIFICANT CHANGE UP (ref 220–430)
TRANSFERRIN SERPL-MCNC: 233 MG/DL — SIGNIFICANT CHANGE UP (ref 200–360)
TROPONIN I SERPL-MCNC: 5.88 NG/ML — HIGH (ref 0.02–0.06)
TROPONIN I SERPL-MCNC: 9.25 NG/ML — HIGH (ref 0.02–0.06)
TSH SERPL-MCNC: 1.73 UIU/ML — SIGNIFICANT CHANGE UP (ref 0.27–4.2)
UIBC SERPL-MCNC: 279 UG/DL — SIGNIFICANT CHANGE UP (ref 110–370)
VIT B12 SERPL-MCNC: 716 PG/ML — SIGNIFICANT CHANGE UP (ref 232–1245)
WBC # BLD: 7.51 K/UL — SIGNIFICANT CHANGE UP (ref 3.8–10.5)
WBC # FLD AUTO: 7.51 K/UL — SIGNIFICANT CHANGE UP (ref 3.8–10.5)

## 2019-07-16 PROCEDURE — 74176 CT ABD & PELVIS W/O CONTRAST: CPT | Mod: 26

## 2019-07-16 PROCEDURE — 93010 ELECTROCARDIOGRAM REPORT: CPT

## 2019-07-16 RX ORDER — METOPROLOL TARTRATE 50 MG
12.5 TABLET ORAL EVERY 12 HOURS
Refills: 0 | Status: DISCONTINUED | OUTPATIENT
Start: 2019-07-16 | End: 2019-07-18

## 2019-07-16 RX ORDER — IOHEXOL 300 MG/ML
30 INJECTION, SOLUTION INTRAVENOUS ONCE
Refills: 0 | Status: COMPLETED | OUTPATIENT
Start: 2019-07-16 | End: 2019-07-16

## 2019-07-16 RX ADMIN — CARBIDOPA AND LEVODOPA 1 TABLET(S): 25; 100 TABLET ORAL at 14:18

## 2019-07-16 RX ADMIN — Medication 500 MILLIGRAM(S): at 18:33

## 2019-07-16 RX ADMIN — ATORVASTATIN CALCIUM 20 MILLIGRAM(S): 80 TABLET, FILM COATED ORAL at 21:59

## 2019-07-16 RX ADMIN — Medication 1 TABLET(S): at 11:44

## 2019-07-16 RX ADMIN — PANTOPRAZOLE SODIUM 40 MILLIGRAM(S): 20 TABLET, DELAYED RELEASE ORAL at 06:25

## 2019-07-16 RX ADMIN — Medication 100 MILLIGRAM(S): at 17:26

## 2019-07-16 RX ADMIN — Medication 500 MILLIGRAM(S): at 06:59

## 2019-07-16 RX ADMIN — Medication 100 MILLIGRAM(S): at 06:25

## 2019-07-16 RX ADMIN — Medication 12.5 MILLIGRAM(S): at 18:14

## 2019-07-16 RX ADMIN — PANTOPRAZOLE SODIUM 40 MILLIGRAM(S): 20 TABLET, DELAYED RELEASE ORAL at 17:25

## 2019-07-16 RX ADMIN — Medication 1 DROP(S): at 06:25

## 2019-07-16 RX ADMIN — CARBIDOPA AND LEVODOPA 1 TABLET(S): 25; 100 TABLET ORAL at 22:09

## 2019-07-16 RX ADMIN — Medication 1 DROP(S): at 17:26

## 2019-07-16 RX ADMIN — CARBIDOPA AND LEVODOPA 1 TABLET(S): 25; 100 TABLET ORAL at 06:25

## 2019-07-16 RX ADMIN — Medication 0.5 MILLIGRAM(S): at 21:59

## 2019-07-16 RX ADMIN — IOHEXOL 30 MILLILITER(S): 300 INJECTION, SOLUTION INTRAVENOUS at 10:52

## 2019-07-16 RX ADMIN — LATANOPROST 1 DROP(S): 0.05 SOLUTION/ DROPS OPHTHALMIC; TOPICAL at 21:59

## 2019-07-16 NOTE — PROGRESS NOTE ADULT - ASSESSMENT
85 yo male Acoustic neuroma ,[place] , ,Parkinson Dz, unsteady gait ,  Chronic kidney disease  ,Gout  ,HTN (hypertension)   ,UC (ulcerative colitis) , resident of [facility] brought for eval of SOB and black stools for several days. Found to have anemia and admitted for hemotransfusion and GI workup .Patient had colonoscopy 3 years ago and [place] and had some polyps removed ,he denies any recent hx of GIB or GI workup ,but admits black stools and lightheadedness ,sob ,worsening last few days .Found to have RANJANA elevation and seen by cardiologist Admitted  to telemetry unit for monitoring , send 3 sets of cardiac ensymes to rule out coronary event, obtain ECHO to evaluate LVEF, cardiology consult ,continue current management, O2 supply, anticoagulation plan as per cardiology  ,aggrenox placed on hold until GI clearance will be obtained Nephrology cons called ,IV hydration is administrated ,lisinopril held as per cardiologist recommendation .- ECG with above noted ST depressions

## 2019-07-16 NOTE — DISCHARGE NOTE NURSING/CASE MANAGEMENT/SOCIAL WORK - NSDCDPATPORTLINK_GEN_ALL_CORE
You can access the [facility] Patient Portal, offered by [facility], by registering with the following website: [email or web address]

## 2019-07-16 NOTE — PROGRESS NOTE ADULT - PROBLEM SELECTOR PLAN 1
SERIAL H/H ,TRANSFUSE PRBS , ANEMIA WORKUP AND STOOL FOR FOBT ORDERED ,GI AND HEM CONS CALLED .HOLD AGGRENOX ,TELEMETRY MONITORING

## 2019-07-16 NOTE — PROVIDER CONTACT NOTE (EICU) - SITUATION
Called to asses patient for nstemi and GI bleed.  83 yo M hx UC, CKD, HTn p/w GI bleed and NSTEMI. Has melena but no noted bleeding since admission. Pt (w/ family at bedside) denies CP, SOB, LH, palpitations, abd pain or recurrent bleeding. Vital sign stable. Trop dec to 5. Hb inc to 9.7.  Plan: f/u cardiology reccs regarding plan for nstemi  hold asa/plavix, hep gtt due to anemia and gi bleed  cont statin  add lo dose BB w/ hold parameters   serial cbc for further prbc transfusion requirements and check trop/ekg  f/u GI reccs  d/w bedside staff

## 2019-07-16 NOTE — GOALS OF CARE CONVERSATION - PERSONAL ADVANCE DIRECTIVE - CONVERSATION DETAILS
Spoke with pt he states his sons are his HCPs .Message left for Abdifatah to bring in a copy. Pt states they both know his wishes.

## 2019-07-16 NOTE — PROGRESS NOTE ADULT - SUBJECTIVE AND OBJECTIVE BOX
Patient is a 84y Male whom presented to the hospital with ckd and pb      PAST MEDICAL & SURGICAL HISTORY:  UC (ulcerative colitis)  HTN (hypertension)  UC (ulcerative colitis)  HTN (hypertension)  Gout  Chronic kidney disease  Acoustic neuroma  No significant past surgical history  No significant past surgical history      MEDICATIONS  (STANDING):  atorvastatin 20 milliGRAM(s) Oral at bedtime  carbidopa/levodopa  25/100 1 Tablet(s) Oral three times a day  clonazePAM  Tablet 0.5 milliGRAM(s) Oral at bedtime  docusate sodium 100 milliGRAM(s) Oral two times a day  lactobacillus acidophilus 1 Tablet(s) Oral daily  latanoprost 0.005% Ophthalmic Solution 1 Drop(s) Both EYES at bedtime  multivitamin 1 Tablet(s) Oral daily  pantoprazole  Injectable 40 milliGRAM(s) IV Push every 12 hours  sodium chloride 0.9%. 1000 milliLiter(s) (75 mL/Hr) IV Continuous <Continuous>  sulfaSALAzine 500 milliGRAM(s) Oral two times a day  timolol 0.25% Solution 1 Drop(s) Both EYES two times a day      Allergies    No Known Allergies    Intolerances        SOCIAL HISTORY:  Denies ETOh,Smoking,     FAMILY HISTORY:      REVIEW OF SYSTEMS:    CONSTITUTIONAL: No weakness, fevers or chills  RESPIRATORY: No cough, wheezing, hemoptysis; pos  shortness of breath  CARDIOVASCULAR: No chest pain or palpitations  GASTROINTESTINAL: No abdominal or epigastric pain. No nausea, vomiting,     No diarrhea or constipation. pos  melena   GENITOURINARY: No dysuria, frequency or hematuria  SKIN: dry                          8.6    7.51  )-----------( 170      ( 2019 07:50 )             26.7       CBC Full  -  ( 2019 07:50 )  WBC Count : 7.51 K/uL  RBC Count : 2.71 M/uL  Hemoglobin : 8.6 g/dL  Hematocrit : 26.7 %  Platelet Count - Automated : 170 K/uL  Mean Cell Volume : 98.5 fl  Mean Cell Hemoglobin : 31.7 pg  Mean Cell Hemoglobin Concentration : 32.2 gm/dL  Auto Neutrophil # : x  Auto Lymphocyte # : x  Auto Monocyte # : x  Auto Eosinophil # : x  Auto Basophil # : x  Auto Neutrophil % : x  Auto Lymphocyte % : x  Auto Monocyte % : x  Auto Eosinophil % : x  Auto Basophil % : x      07-16    143  |  110<H>  |  52<H>  ----------------------------<  136<H>  3.6   |  20<L>  |  2.31<H>    Ca    8.4      2019 07:50  Mg     1.8     07-15    TPro  6.9  /  Alb  3.6  /  TBili  0.3  /  DBili  x   /  AST  50<H>  /  ALT  22  /  AlkPhos  121<H>  07-15      CAPILLARY BLOOD GLUCOSE          Vital Signs Last 24 Hrs  T(C): 36.4 (2019 14:16), Max: 37.1 (15 Jul 2019 16:56)  T(F): 97.6 (2019 14:16), Max: 98.8 (15 Jul 2019 16:56)  HR: 74 (2019 14:16) (53 - 74)  BP: 160/76 (2019 14:16) (113/66 - 160/76)  BP(mean): --  RR: 16 (2019 14:16) (16 - 19)  SpO2: 97% (2019 14:16) (95% - 98%)    Urinalysis Basic - ( 15 Jul 2019 08:13 )    Color: Yellow / Appearance: Clear / S.015 / pH: x  Gluc: x / Ketone: Negative  / Bili: Negative / Urobili: Negative mg/dL   Blood: x / Protein: 30 mg/dL / Nitrite: Negative   Leuk Esterase: Negative / RBC: x / WBC 0-2   Sq Epi: x / Non Sq Epi: x / Bacteria: Few        PT/INR - ( 15 Jul 2019 06:58 )   PT: 12.1 sec;   INR: 1.11 ratio         PTT - ( 15 Jul 2019 06:58 )  PTT:33.2 sec  PHYSICAL EXAM:    Constitutional: NAD  HEENT: conjunctive   clear   Neck:  No JVD  Respiratory: decrease bs b/l   Cardiovascular: S1 and S2  Gastrointestinal: BS+, soft, NT/ND  Extremities: trace  peripheral edema  Neurological:  no focal deficits  Psychiatric: normal affect  Skin: dry  Access: Not applicable

## 2019-07-16 NOTE — PROVIDER CONTACT NOTE (CRITICAL VALUE NOTIFICATION) - ACTION/TREATMENT ORDERED:
Type and cross, transfuse PRBC
EKG  ordered.  Message left for cardiology  Repeat troponin in aM
MD Mack to notify cardiologist MD Mccrary. MD Mccrary called unit and plan is continue to monitor patient, continue treatment for GI bleeding, repeat lab work to be ordered. no transfer to cardiac center necessary.
Repeat troponin in am. CBC am.

## 2019-07-16 NOTE — PROGRESS NOTE ADULT - SUBJECTIVE AND OBJECTIVE BOX
PROGRESS NOTE  Patient is a 84y old  Male who presents with a chief complaint of SOB , anemia ,black stools (2019 09:58)  Chart and available morning labs /imaging are reviewed electronically , urgent issues addressed . More information  is being added upon completion of rounds , when more information is collected and management discussed with consultants , medical staff and social service/case management on the floor     OVERNIGHT  No new issues reported by medical staff . All above noted Patient is resting in a bed comfortably . .No distress noted   no significant events overnight reported   no gi bleeding, no melena overnight.   HPI:  83 yo male Acoustic neuroma ,[place] , ,Parkinson Dz, unsteady gait ,  Chronic kidney disease  ,Gout  ,HTN (hypertension)   ,UC (ulcerative colitis) , resident of [facility] brought for eval of SOB and black stools for several days. Found to have anemia and admitted for hemotransfusion and GI workup .Patient had colonoscopy 3 years ago and [place] and had some polyps removed ,he denies any recent hx of GIB or GI workup ,but admits black stools and lightheadedness ,sob ,worsening last few days .Found to have RANJANA elevation and seen by cardiologist Admitted  to telemetry unit for monitoring , send 3 sets of cardiac ensymes to rule out coronary event, obtain ECHO to evaluate LVEF, cardiology consult ,continue current management, O2 supply, anticoagulation plan as per cardiology  ,aggrenox placed on hold until GI clearance will be obtained Nephrology cons called ,IV hydration is administrated ,lisinopril held as per cardiologist recommendation . (15 Jul 2019 08:57)    PAST MEDICAL & SURGICAL HISTORY:  UC (ulcerative colitis)  HTN (hypertension)  UC (ulcerative colitis)  HTN (hypertension)  Gout  Chronic kidney disease  Acoustic neuroma  No significant past surgical history  No significant past surgical history      MEDICATIONS  (STANDING):  atorvastatin 20 milliGRAM(s) Oral at bedtime  carbidopa/levodopa  25/100 1 Tablet(s) Oral three times a day  clonazePAM  Tablet 0.5 milliGRAM(s) Oral at bedtime  dextrose 5% + sodium chloride 0.45%. 1000 milliLiter(s) (50 mL/Hr) IV Continuous <Continuous>  docusate sodium 100 milliGRAM(s) Oral two times a day  lactobacillus acidophilus 1 Tablet(s) Oral daily  latanoprost 0.005% Ophthalmic Solution 1 Drop(s) Both EYES at bedtime  multivitamin 1 Tablet(s) Oral daily  pantoprazole  Injectable 40 milliGRAM(s) IV Push every 12 hours  sulfaSALAzine 500 milliGRAM(s) Oral two times a day  timolol 0.25% Solution 1 Drop(s) Both EYES two times a day    MEDICATIONS  (PRN):  acetaminophen   Tablet .. 650 milliGRAM(s) Oral every 6 hours PRN Temp greater or equal to 38C (100.4F), Mild Pain (1 - 3)  melatonin 3 milliGRAM(s) Oral at bedtime PRN Insomnia      OBJECTIVE    T(C): 36.7 (19 @ 10:29), Max: 37.1 (07-15-19 @ 16:56)  HR: 59 (19 @ 10:29) (53 - 70)  BP: 119/67 (19 @ 10:29) (113/66 - 136/74)  RR: 17 (19 @ 10:29) (16 - 19)  SpO2: 97% (19 @ 10:29) (95% - 99%)  Wt(kg): --  I&O's Summary    15 Jul 2019 07:01  -  2019 07:00  --------------------------------------------------------  IN: 734 mL / OUT: 650 mL / NET: 84 mL  REVIEW OF SYSTEMS:  OOB TC ,NAD ,VSS COMFORTABLE DENIES CP ,NO SOB VSS  DIDNT HAVE BM TODAY   PHYSICAL EXAM:  Appearance: NAD. VS past 24 hrs -as above   HEENT:   Moist oral mucosa. Conjunctiva clear b/l.   Neck : supple  Respiratory: Lungs CTAB.  Gastrointestinal:  Soft, nontender. No rebound. No rigidity. BS present	  Cardiovascular: RRR ,S1S2 present nl S1/S2, no M/R/G  Neurologic: Non-focal. Moving all extremities.  Extremities: No edema. No erythema. No calf tenderness.  Skin: PALLOR No rashes, No ecchymoses, No cyanosis.	  wounds ,skin lesions-See skin assesment flow sheet   LABS:                        8.6    7.51  )-----------( 170      ( 2019 07:50 )             26.7     07-16    143  |  110<H>  |  52<H>  ----------------------------<  136<H>  3.6   |  20<L>  |  2.31<H>    Ca    8.4      2019 07:50  Mg     1.8     07-15    TPro  6.9  /  Alb  3.6  /  TBili  0.3  /  DBili  x   /  AST  50<H>  /  ALT  22  /  AlkPhos  121<H>  07-15    CAPILLARY BLOOD GLUCOSE        PT/INR - ( 15 Jul 2019 06:58 )   PT: 12.1 sec;   INR: 1.11 ratio         PTT - ( 15 Jul 2019 06:58 )  PTT:33.2 sec  Urinalysis Basic - ( 15 Jul 2019 08:13 )    Color: Yellow / Appearance: Clear / S.015 / pH: x  Gluc: x / Ketone: Negative  / Bili: Negative / Urobili: Negative mg/dL   Blood: x / Protein: 30 mg/dL / Nitrite: Negative   Leuk Esterase: Negative / RBC: x / WBC 0-2   Sq Epi: x / Non Sq Epi: x / Bacteria: Few        RADIOLOGY & ADDITIONAL TESTS:< from: US Transthoracic Echocardiogram w/Doppler Complete (07.15.19 @ 10:29) >  EXAM:  US TTE W DOPPLER COMPLETE                                  PROCEDURE DATE:  07/15/2019          INTERPRETATION:    Indication: Non-Q wave MI    Technician: Ivet Flynn     Study Quality: Technical difficult study, poor endocardial visualization   technical limited study    Height 5 feet 7 inches, weight 160 pounds, blood pressure 122/62 mmHg    Measurements: Aortic root size 3.0 cm, left it is at 3.4 cm, left   ventricular end-diastolic diameter 4.7 cm, left ventricular end-systolic   diameter 3.1 cm, left ventricular septal wall thickness 1.0 cm, posterior   wall thickness 1.07c m, aortic velocity 1.4 m/s, mitral velocity 1.0 m/s,   ejection fraction 55-60%.    Mitral Valve: Thickened mitral valve with normal opening with mild mitral   regurgitation  Aortic Valve: Not well-visualized but appears to be thickened with normal   cusp excursion  Left Atrium: Appears normal size  Left Ventricle: Grossly normal size with grossly normal overall left   ventricular systolic function ejection fraction approximately 55%. There   is suspicious of inferoseptal wall, basal inferior lateral wall   hypokinesis noted. There is stage I diastolic dysfunction noted.    Pericardium: No pericardial effusion  Tricuspid Valve: Not well-visualized but appears to be normal trace   tricuspid regurgitation  Pulmonic Valve: Not well-visualized  Right Ventricle: Not well-visualized but appears to be normal size  Right Atrium: Normal appears normal size    SUMMARY:  1. technical difficult study, limited study  2. grossly overall left ventricular systolic function, possible   inferoseptal wall, inferior lateral wall hypokinesis. Grade one diastolic   dysfunction  3. mild mitral regurgitation    < end of copied text >  < from: Xray Chest 1 View- PORTABLE-Urgent (07.15.19 @ 08:21) >  EXAM:  XR CHEST PORTABLE URGENT 1V                                  PROCEDURE DATE:  07/15/2019          INTERPRETATION:  Clinical information: Chest pain    Portable exam, 8:14 a.m.    No prior studies present for comparison.    Cardiac monitor leads overlie the chest    Mild prominence of interstitial markings at the lung bases. The diaphragm   is flattened, an emphysematous change. Trace blunting both costophrenic   angles. Heart size within normal limits. Hilar regions, mediastinal   contours intact. Aorta shows atherosclerotic changes. No acute osseous   abnormalities.    IMPRESSION: See above report    < end of copied text >     reviewed elctronically  ASSESSMENT/PLAN:

## 2019-07-16 NOTE — PROGRESS NOTE ADULT - ASSESSMENT
84m w/ parkinsons, ulcerative colitis (untreated) a/w dark stools and anemia    also noted troponin elevation, acute on chronic renal failure   troponins now >8, received 2 unit prbc yesterday   no bleeding overnight, pt comfortable.     Holding aggrenox for time being   Protonix 40mg Iv BID   --- CT a/p with oral contrast to further evaluate   - clear liquids   -CBC daily  -will d/w son re EGD , RBIA to be discussed.

## 2019-07-16 NOTE — DISCHARGE NOTE NURSING/CASE MANAGEMENT/SOCIAL WORK - NSDCCRNAME_GEN_ALL_CORE_FT
[facility] on [address] [phone]/ fax [phone]. Utilizes [facility] pharmacy [phone] [facility] on [address] [phone]/ fax [phone].

## 2019-07-16 NOTE — PROGRESS NOTE ADULT - ASSESSMENT
The patient is an 84 year old male with a history of HTN, UC, TIAs, Parkinson's who presents with GI bleed, TITA, NSTEMI.    Plan:  - ECG with above noted ST depressions  - Troponin trended up to 8.207. No need to trend further. Cannot exclude plaque rupture/thrombus formation although more likely there is significant chronic CAD with demand from severe anemia and TITA.  - Echocardiogram with grossly normal LV systolic function, possible inferior hypokinesis  - Hold all antiplatelet and anticoagulants  - Not a candidate for medical or invasive therapy for NSTEMI due to ongoing bleed and TITA  - Hold lisinopril. Consider alternative therapy due to TITA/CKD.  - Continue atorvastatin 20 mg daily  - Transfuse. Trend CBCs.  - Consider EGD to further evaluate source of bleed and guide when to restart antiplatelet therapy The patient is an 84 year old male with a history of HTN, UC, TIAs, Parkinson's who presents with GI bleed, TITA, NSTEMI.    Plan:  - ECG with above noted ST depressions  - Troponin trended up to 8.207. No need to trend further. Cannot exclude plaque rupture/thrombus formation although more likely there is significant chronic CAD with demand from severe anemia and TITA.  - Echocardiogram with grossly normal LV systolic function, possible inferior hypokinesis  - Hold all antiplatelet and anticoagulants  - Not a candidate for medical or invasive therapy for NSTEMI due to ongoing bleed and TITA  - Hold lisinopril. Consider alternative therapy due to TITA/CKD.  - Continue atorvastatin 20 mg daily  - Transfuse. Trend CBCs.  - Consider EGD to further evaluate source of bleed and guide when to restart antiplatelet therapy. There is no cardiac contraindication to proceeding with this and use of sedation with propofol during the procedure.

## 2019-07-16 NOTE — DISCHARGE NOTE NURSING/CASE MANAGEMENT/SOCIAL WORK - NSDCCRTYPESERV_GEN_ALL_CORE_FT
You are a resident of above assisted living facility ([facility]) and receive assistance from [facility] staff

## 2019-07-16 NOTE — DISCHARGE NOTE NURSING/CASE MANAGEMENT/SOCIAL WORK - NSDCDMETYPESERV_GEN_ALL_CORE_FT
prior to this admission: you have your own rolling walker @ [facility]

## 2019-07-16 NOTE — PROGRESS NOTE ADULT - SUBJECTIVE AND OBJECTIVE BOX
Chief Complaint:        INTERVAL HPI/OVERNIGHT EVENTS:    no significant events overnight reported   no gi bleeding, no melena overnight.     MEDICATIONS  (STANDING):  atorvastatin 20 milliGRAM(s) Oral at bedtime  carbidopa/levodopa  25/100 1 Tablet(s) Oral three times a day  clonazePAM  Tablet 0.5 milliGRAM(s) Oral at bedtime  dextrose 5% + sodium chloride 0.45%. 1000 milliLiter(s) (50 mL/Hr) IV Continuous <Continuous>  docusate sodium 100 milliGRAM(s) Oral two times a day  lactobacillus acidophilus 1 Tablet(s) Oral daily  latanoprost 0.005% Ophthalmic Solution 1 Drop(s) Both EYES at bedtime  multivitamin 1 Tablet(s) Oral daily  pantoprazole  Injectable 40 milliGRAM(s) IV Push every 12 hours  sulfaSALAzine 500 milliGRAM(s) Oral two times a day  timolol 0.25% Solution 1 Drop(s) Both EYES two times a day    MEDICATIONS  (PRN):  acetaminophen   Tablet .. 650 milliGRAM(s) Oral every 6 hours PRN Temp greater or equal to 38C (100.4F), Mild Pain (1 - 3)  melatonin 3 milliGRAM(s) Oral at bedtime PRN Insomnia            Vital Signs Last 24 Hrs  T(C): 36.8 (16 Jul 2019 06:07), Max: 37.1 (15 Jul 2019 16:56)  T(F): 98.3 (16 Jul 2019 06:07), Max: 98.8 (15 Jul 2019 16:56)  HR: 66 (16 Jul 2019 06:07) (53 - 70)  BP: 136/74 (16 Jul 2019 06:07) (113/66 - 136/74)  BP(mean): --  RR: 16 (16 Jul 2019 06:07) (16 - 19)  SpO2: 95% (16 Jul 2019 06:07) (95% - 99%)    Physical exam:    abd soft,n on tender  cv s1s12  chest air entry        LABS:                        8.6    7.51  )-----------( 170      ( 16 Jul 2019 07:50 )             26.7     07-16    143  |  110<H>  |  52<H>  ----------------------------<  136<H>  3.6   |  20<L>  |  2.31<H>    Ca    8.4      16 Jul 2019 07:50  Mg     1.8     07-15    TPro  6.9  /  Alb  3.6  /  TBili  0.3  /  DBili  x   /  AST  50<H>  /  ALT  22  /  AlkPhos  121<H>  07-15    PT/INR - ( 15 Jul 2019 06:58 )   PT: 12.1 sec;   INR: 1.11 ratio         PTT - ( 15 Jul 2019 06:58 )  PTT:33.2 sec      RADIOLOGY & ADDITIONAL TESTS:

## 2019-07-16 NOTE — PROGRESS NOTE ADULT - SUBJECTIVE AND OBJECTIVE BOX
Chief Complaint: GI bleed, shortness of breath, chest pain    Interval Events: Troponin trended up to 8.207. Received 2 units pRBC.    Review of Systems:  General: No fevers, chills, weight loss or gain  Skin: No rashes, color changes  Cardiovascular: No chest pain, orthopnea  Respiratory: No shortness of breath, cough  Gastrointestinal: No nausea, abdominal pain  Genitourinary: No incontinence, pain with urination  Musculoskeletal: No pain, swelling, decreased range of motion  Neurological: No headache, weakness  Psychiatric: No depression, anxiety  Endocrine: No weight loss or gain, increased thirst  All other systems are comprehensively negative.    Physical Exam:  Vitals:        Vital Signs Last 24 Hrs  T(C): 36.8 (16 Jul 2019 06:07), Max: 37.1 (15 Jul 2019 16:56)  T(F): 98.3 (16 Jul 2019 06:07), Max: 98.8 (15 Jul 2019 16:56)  HR: 66 (16 Jul 2019 06:07) (53 - 70)  BP: 136/74 (16 Jul 2019 06:07) (113/66 - 136/74)  BP(mean): --  RR: 16 (16 Jul 2019 06:07) (16 - 19)  SpO2: 95% (16 Jul 2019 06:07) (95% - 99%)  General: NAD  HEENT: MMM  Neck: No JVD, no carotid bruit  Lungs: CTAB  CV: RRR, nl S1/S2, no M/R/G  Abdomen: S/NT/ND, +BS  Extremities: No LE edema, no cyanosis  Neuro: AAOx3, non-focal  Skin: No rash    Labs:                        8.0    x     )-----------( x        ( 16 Jul 2019 00:25 )             24.4     07-15    140  |  107  |  71<H>  ----------------------------<  166<H>  3.5   |  21<L>  |  2.70<H>    Ca    8.9      15 Jul 2019 06:58  Mg     1.8     07-15    TPro  6.9  /  Alb  3.6  /  TBili  0.3  /  DBili  x   /  AST  50<H>  /  ALT  22  /  AlkPhos  121<H>  07-15    CARDIAC MARKERS ( 15 Jul 2019 18:36 )  8.207 ng/mL / x     / x     / x     / x      CARDIAC MARKERS ( 15 Jul 2019 12:39 )  5.768 ng/mL / x     / x     / x     / x      CARDIAC MARKERS ( 15 Jul 2019 06:58 )  4.260 ng/mL / x     / 189 U/L / x     / 18.2 ng/mL      PT/INR - ( 15 Jul 2019 06:58 )   PT: 12.1 sec;   INR: 1.11 ratio         PTT - ( 15 Jul 2019 06:58 )  PTT:33.2 sec    Telemetry: Sinus rhythm, bradycardic at times

## 2019-07-16 NOTE — PROGRESS NOTE ADULT - PROBLEM SELECTOR PLAN 5
hold lisinopril due to pb , continue current management and present  medications ,seen by cardiologist ,BP management as per Dr Mccrary

## 2019-07-16 NOTE — PROGRESS NOTE ADULT - ASSESSMENT
83 yo male Acoustic neuroma ,[place] , ,Parkinson Dz, unsteady gait ,  Chronic kidney disease  ,Gout  ,HTN (hypertension)   ,UC (ulcerative colitis) , resident of [facility] brought for eval of SOB and black stools for several days. Found to have anemia and admitted for hemotransfusion and GI workup .Patient had colonoscopy 3 years ago and [place] and had some polyps removed ,he denies any recent hx of GIB or GI workup ,but admits black stools and lightheadedness ,sob ,worsening last few days .Found to have RANJANA elevation and seen by cardiologist Admitted  to telemetry unit for monitoring , send 3 sets of cardiac ensymes to rule out coronary event, obtain ECHO to evaluate LVEF, cardiology consult ,continue current management, O2 supply, anticoagulation plan as per cardiology  ,aggrenox placed on hold until GI clearance will be obtained Nephrology cons called ,IV hydration is administrated ,lisinopril held as per cardiologist recommendation . (15 Jul 2019 08:57)

## 2019-07-16 NOTE — PROGRESS NOTE ADULT - PROBLEM SELECTOR PLAN 1
CHRONIC KIDNEY DISEASE, STAGE 3: increase water intake , continue with iv fluid   Serum creatinine is stable at 2.31, approximating a GFR of is decreased  ml/min.   There is no progression.  No uremic symptoms. No evidence of  worsening  Anemia. Fluid status stable.   Will continue to avoid nephrotoxic drugs.  Patient remains asymptomatic.  Continue current therapy.

## 2019-07-17 DIAGNOSIS — R41.0 DISORIENTATION, UNSPECIFIED: ICD-10-CM

## 2019-07-17 DIAGNOSIS — H01.003 UNSPECIFIED BLEPHARITIS RIGHT EYE, UNSPECIFIED EYELID: ICD-10-CM

## 2019-07-17 DIAGNOSIS — R93.5 ABNORMAL FINDINGS ON DIAGNOSTIC IMAGING OF OTHER ABDOMINAL REGIONS, INCLUDING RETROPERITONEUM: ICD-10-CM

## 2019-07-17 LAB
ANION GAP SERPL CALC-SCNC: 11 MMOL/L — SIGNIFICANT CHANGE UP (ref 5–17)
BUN SERPL-MCNC: 34 MG/DL — HIGH (ref 7–23)
CALCIUM SERPL-MCNC: 8.7 MG/DL — SIGNIFICANT CHANGE UP (ref 8.4–10.5)
CHLORIDE SERPL-SCNC: 110 MMOL/L — HIGH (ref 96–108)
CO2 SERPL-SCNC: 22 MMOL/L — SIGNIFICANT CHANGE UP (ref 22–31)
CREAT SERPL-MCNC: 1.9 MG/DL — HIGH (ref 0.5–1.3)
GLUCOSE SERPL-MCNC: 143 MG/DL — HIGH (ref 70–99)
HCT VFR BLD CALC: 28.7 % — LOW (ref 39–50)
HGB BLD-MCNC: 9.2 G/DL — LOW (ref 13–17)
MCHC RBC-ENTMCNC: 31.5 PG — SIGNIFICANT CHANGE UP (ref 27–34)
MCHC RBC-ENTMCNC: 32.1 GM/DL — SIGNIFICANT CHANGE UP (ref 32–36)
MCV RBC AUTO: 98.3 FL — SIGNIFICANT CHANGE UP (ref 80–100)
NRBC # BLD: 0 /100 WBCS — SIGNIFICANT CHANGE UP (ref 0–0)
PLATELET # BLD AUTO: 202 K/UL — SIGNIFICANT CHANGE UP (ref 150–400)
POTASSIUM SERPL-MCNC: 4 MMOL/L — SIGNIFICANT CHANGE UP (ref 3.5–5.3)
POTASSIUM SERPL-SCNC: 4 MMOL/L — SIGNIFICANT CHANGE UP (ref 3.5–5.3)
RBC # BLD: 2.92 M/UL — LOW (ref 4.2–5.8)
RBC # FLD: 16.8 % — HIGH (ref 10.3–14.5)
SODIUM SERPL-SCNC: 143 MMOL/L — SIGNIFICANT CHANGE UP (ref 135–145)
WBC # BLD: 9.7 K/UL — SIGNIFICANT CHANGE UP (ref 3.8–10.5)
WBC # FLD AUTO: 9.7 K/UL — SIGNIFICANT CHANGE UP (ref 3.8–10.5)

## 2019-07-17 RX ORDER — CLONAZEPAM 1 MG
1 TABLET ORAL
Qty: 0 | Refills: 0 | DISCHARGE
Start: 2019-07-17

## 2019-07-17 RX ORDER — TOBRAMYCIN 0.3 %
1 DROPS OPHTHALMIC (EYE)
Qty: 0 | Refills: 0 | DISCHARGE
Start: 2019-07-17

## 2019-07-17 RX ORDER — LISINOPRIL 2.5 MG/1
1 TABLET ORAL
Qty: 0 | Refills: 0 | DISCHARGE

## 2019-07-17 RX ORDER — TIMOLOL 0.5 %
1 DROPS OPHTHALMIC (EYE)
Qty: 0 | Refills: 0 | DISCHARGE
Start: 2019-07-17

## 2019-07-17 RX ORDER — ASPIRIN AND DIPYRIDAMOLE 25; 200 MG/1; MG/1
1 CAPSULE, EXTENDED RELEASE ORAL
Qty: 0 | Refills: 0 | DISCHARGE

## 2019-07-17 RX ORDER — ATORVASTATIN CALCIUM 80 MG/1
1 TABLET, FILM COATED ORAL
Qty: 0 | Refills: 0 | DISCHARGE
Start: 2019-07-17

## 2019-07-17 RX ORDER — TIMOLOL 0.5 %
1 DROPS OPHTHALMIC (EYE)
Qty: 0 | Refills: 0 | DISCHARGE

## 2019-07-17 RX ORDER — ACETAMINOPHEN 500 MG
2 TABLET ORAL
Qty: 0 | Refills: 0 | DISCHARGE
Start: 2019-07-17

## 2019-07-17 RX ORDER — SULFASALAZINE 500 MG
1 TABLET ORAL
Qty: 0 | Refills: 0 | DISCHARGE
Start: 2019-07-17

## 2019-07-17 RX ORDER — CLONAZEPAM 1 MG
1 TABLET ORAL
Qty: 0 | Refills: 0 | DISCHARGE

## 2019-07-17 RX ORDER — DOCUSATE SODIUM 100 MG
1 CAPSULE ORAL
Qty: 0 | Refills: 0 | DISCHARGE

## 2019-07-17 RX ORDER — DOCUSATE SODIUM 100 MG
1 CAPSULE ORAL
Qty: 0 | Refills: 0 | DISCHARGE
Start: 2019-07-17

## 2019-07-17 RX ORDER — CARBIDOPA AND LEVODOPA 25; 100 MG/1; MG/1
1 TABLET ORAL
Qty: 0 | Refills: 0 | DISCHARGE

## 2019-07-17 RX ORDER — LATANOPROST 0.05 MG/ML
1 SOLUTION/ DROPS OPHTHALMIC; TOPICAL
Qty: 0 | Refills: 0 | DISCHARGE

## 2019-07-17 RX ORDER — LATANOPROST 0.05 MG/ML
1 SOLUTION/ DROPS OPHTHALMIC; TOPICAL
Qty: 0 | Refills: 0 | DISCHARGE
Start: 2019-07-17

## 2019-07-17 RX ORDER — LANOLIN ALCOHOL/MO/W.PET/CERES
1 CREAM (GRAM) TOPICAL
Qty: 0 | Refills: 0 | DISCHARGE
Start: 2019-07-17

## 2019-07-17 RX ORDER — SULFASALAZINE 500 MG
1 TABLET ORAL
Qty: 0 | Refills: 0 | DISCHARGE

## 2019-07-17 RX ORDER — SULFASALAZINE 500 MG
2 TABLET ORAL
Qty: 0 | Refills: 0 | DISCHARGE

## 2019-07-17 RX ORDER — ATORVASTATIN CALCIUM 80 MG/1
1 TABLET, FILM COATED ORAL
Qty: 0 | Refills: 0 | DISCHARGE

## 2019-07-17 RX ORDER — METOPROLOL TARTRATE 50 MG
12.5 TABLET ORAL
Qty: 0 | Refills: 0 | DISCHARGE
Start: 2019-07-17

## 2019-07-17 RX ORDER — PANTOPRAZOLE SODIUM 20 MG/1
40 TABLET, DELAYED RELEASE ORAL
Qty: 0 | Refills: 0 | DISCHARGE
Start: 2019-07-17

## 2019-07-17 RX ORDER — CARBIDOPA AND LEVODOPA 25; 100 MG/1; MG/1
1 TABLET ORAL
Qty: 0 | Refills: 0 | DISCHARGE
Start: 2019-07-17

## 2019-07-17 RX ORDER — TOBRAMYCIN 0.3 %
1 DROPS OPHTHALMIC (EYE)
Refills: 0 | Status: DISCONTINUED | OUTPATIENT
Start: 2019-07-17 | End: 2019-07-18

## 2019-07-17 RX ADMIN — ATORVASTATIN CALCIUM 20 MILLIGRAM(S): 80 TABLET, FILM COATED ORAL at 21:18

## 2019-07-17 RX ADMIN — Medication 3 MILLIGRAM(S): at 21:18

## 2019-07-17 RX ADMIN — CARBIDOPA AND LEVODOPA 1 TABLET(S): 25; 100 TABLET ORAL at 21:18

## 2019-07-17 RX ADMIN — PANTOPRAZOLE SODIUM 40 MILLIGRAM(S): 20 TABLET, DELAYED RELEASE ORAL at 05:08

## 2019-07-17 RX ADMIN — Medication 1 DROP(S): at 05:08

## 2019-07-17 RX ADMIN — Medication 500 MILLIGRAM(S): at 05:08

## 2019-07-17 RX ADMIN — SODIUM CHLORIDE 50 MILLILITER(S): 9 INJECTION, SOLUTION INTRAVENOUS at 11:59

## 2019-07-17 RX ADMIN — Medication 1 DROP(S): at 17:42

## 2019-07-17 RX ADMIN — Medication 500 MILLIGRAM(S): at 17:08

## 2019-07-17 RX ADMIN — Medication 0.5 MILLIGRAM(S): at 21:18

## 2019-07-17 RX ADMIN — CARBIDOPA AND LEVODOPA 1 TABLET(S): 25; 100 TABLET ORAL at 05:10

## 2019-07-17 RX ADMIN — Medication 100 MILLIGRAM(S): at 17:08

## 2019-07-17 RX ADMIN — PANTOPRAZOLE SODIUM 40 MILLIGRAM(S): 20 TABLET, DELAYED RELEASE ORAL at 17:08

## 2019-07-17 RX ADMIN — Medication 100 MILLIGRAM(S): at 05:08

## 2019-07-17 RX ADMIN — Medication 1 TABLET(S): at 11:58

## 2019-07-17 RX ADMIN — Medication 12.5 MILLIGRAM(S): at 05:08

## 2019-07-17 RX ADMIN — CARBIDOPA AND LEVODOPA 1 TABLET(S): 25; 100 TABLET ORAL at 14:02

## 2019-07-17 RX ADMIN — LATANOPROST 1 DROP(S): 0.05 SOLUTION/ DROPS OPHTHALMIC; TOPICAL at 21:18

## 2019-07-17 RX ADMIN — Medication 1 DROP(S): at 17:08

## 2019-07-17 RX ADMIN — Medication 12.5 MILLIGRAM(S): at 17:18

## 2019-07-17 NOTE — PROGRESS NOTE ADULT - SUBJECTIVE AND OBJECTIVE BOX
Chief Complaint: GI bleed, shortness of breath, chest pain    Interval Events: Troponin trended down, hemoglobin trended up. No other complaints. Confused this morning.    Review of Systems:  General: No fevers, chills, weight loss or gain  Skin: No rashes, color changes  Cardiovascular: No chest pain, orthopnea  Respiratory: No shortness of breath, cough  Gastrointestinal: No nausea, abdominal pain  Genitourinary: No incontinence, pain with urination  Musculoskeletal: No pain, swelling, decreased range of motion  Neurological: No headache, weakness  Psychiatric: No depression, anxiety  Endocrine: No weight loss or gain, increased thirst  All other systems are comprehensively negative.    Physical Exam:  Vital Signs Last 24 Hrs  T(C): 36.4 (17 Jul 2019 04:07), Max: 36.8 (16 Jul 2019 15:39)  T(F): 97.6 (17 Jul 2019 04:07), Max: 98.3 (16 Jul 2019 15:39)  HR: 58 (17 Jul 2019 06:00) (56 - 74)  BP: 133/66 (17 Jul 2019 06:00) (105/67 - 160/76)  BP(mean): 84 (17 Jul 2019 06:00) (74 - 97)  RR: 19 (17 Jul 2019 06:00) (15 - 27)  SpO2: 96% (17 Jul 2019 06:00) (94% - 98%)  General: NAD  HEENT: MMM  Neck: No JVD, no carotid bruit  Lungs: CTAB  CV: RRR, nl S1/S2, no M/R/G  Abdomen: S/NT/ND, +BS  Extremities: No LE edema, no cyanosis  Neuro: AAOx3, non-focal  Skin: No rash    Labs:             07-17    143  |  110<H>  |  34<H>  ----------------------------<  143<H>  4.0   |  22  |  1.90<H>    Ca    8.7      17 Jul 2019 06:32                          9.2    9.70  )-----------( 202      ( 17 Jul 2019 06:32 )             28.7       Telemetry: Sinus rhythm, bradycardic at times

## 2019-07-17 NOTE — DISCHARGE NOTE PROVIDER - HOSPITAL COURSE
85 yo male Acoustic neuroma ,[place] , ,Parkinson Dz, unsteady gait ,  Chronic kidney disease  ,Gout  ,HTN (hypertension)   ,UC (ulcerative colitis) , resident of [facility] brought for eval of SOB and black stools for several days. Found to have anemia and admitted for hemotransfusion and GI workup .Patient had colonoscopy 3 years ago and [place] and had some polyps removed ,he denies any recent hx of GIB or GI workup ,but admits black stools and lightheadedness ,sob ,worsening last few days .Found to have RANJANA elevation and seen by cardiologist Admitted  to telemetry unit for monitoring , send 3 sets of cardiac ensymes to rule out coronary event, obtain ECHO to evaluate LVEF, cardiology consult ,continue current management, O2 supply, anticoagulation plan as per cardiology  ,aggrenox placed on hold until GI clearance will be obtained Nephrology cons called ,IV hydration is administrated ,lisinopril held as per cardiologist recommendation .- ECG with above noted ST depressions    ·  Problem: GIB (gastrointestinal bleeding).  Plan: SERIAL CBC , PII BID , IV FLUIDS , GI CONSULT , HOLD AGGRENOX ,MONITOR H/H CLOSELY , S/p  2 U OF PRBCS ,EGD planned by GI consult TRANSFER TO [facility] IS RECOMMENDED BY ANESTHESIOLOGIST , D/W CARD AND GI CONS ,ALL PHYSICIANS ARE IN AGREEMENT.     ·  Problem: Troponin level elevated.  Plan: 2/2 to possible  NSTEMI -followed by cardiologist - Not a candidate for medical or invasive therapy for NSTEMI due to ongoing bleed and TITA .As per Dr Mccrary -cannot exclude plaque rupture/thrombus formation although more likely there is significant chronic CAD with demand from severe anemia and TITA TRANSFER TO [facility] IS RECOMMENDED BY ANESTHESIOLOGIST , D/W CARD AND GI CONS ,ALL PHYSICIANS ARE IN AGREEMENT.     ·  Problem: Abnormal CT of the abdomen.  Plan: REPORT WAS D/W SON COURTNEY ,HE IS AWARE THAT PATIENT WILL REQUIRE COLONOSCOPY.     ·  Problem: Anemia.  Plan: SERIAL H/H ,TRANSFUSE PRBS , ANEMIA WORKUP AND STOOL FOR FOBT ORDERED ,GI AND HEM CONS CALLED .HOLD AGGRENOX ,TELEMETRY MONITORING.     ·  Problem: UC (ulcerative colitis).  Plan: continue home medications ,seen by GI CONSULT ,continue PPI.     Problem: Chronic kidney disease. Plan: serial bmp ,ins/outs ,nephrology eval called ,avoid nephrotoxins.    ·  Problem: HTN (hypertension).  Plan: hold lisinopril due to tita , continue current management and present  medications ,seen by cardiologist ,BP management as per Dr Mccrary.     ·  Problem: Delirium.     ·  Problem: Blepharitis of right eye.     Problem: Prophylactic measure. Plan; Gastrointestinal stress ulcer prophylaxis and DVT prophylaxis administrated.     TRANSFER TO [facility] WHEN THE BED IS AVAILABLE .SPOKE TO THE TRANSFER CENTER AND ACCEPTING MD DR.DAVINDER PHILLIPS .D/C SUMMARY COMPLETED ELECTRONICALLY AND TRANSFER PACKAGE IS COMPLETED. 85 yo male Acoustic neuroma ,[place] , ,Parkinson Dz, unsteady gait ,  Chronic kidney disease  ,Gout  ,HTN (hypertension)   ,UC (ulcerative colitis) , resident of [facility] brought for eval of SOB and black stools for several days. Found to have anemia and admitted for hemotransfusion and GI workup .Patient had colonoscopy 3 years ago and [place] and had some polyps removed ,he denies any recent hx of GIB or GI workup ,but admits black stools and lightheadedness ,sob ,worsening last few days .Found to have RANJANA elevation and seen by cardiologist Admitted  to telemetry unit for monitoring , send 3 sets of cardiac ensymes to rule out coronary event, obtain ECHO to evaluate LVEF, cardiology consult ,continue current management, O2 supply, anticoagulation plan as per cardiology  ,aggrenox placed on hold until GI clearance will be obtained Nephrology cons called ,IV hydration is administrated ,lisinopril held as per cardiologist recommendation .- ECG with above noted ST depressions    ·  Problem: GIB (gastrointestinal bleeding).  Plan: SERIAL CBC , PII BID , IV FLUIDS , GI CONSULT , HOLD AGGRENOX ,MONITOR H/H CLOSELY , S/p  2 U OF PRBCS ,EGD planned by GI consult TRANSFER TO [facility] IS RECOMMENDED BY ANESTHESIOLOGIST , D/W CARD AND GI CONS ,ALL PHYSICIANS ARE IN AGREEMENT.     ·  Problem: Troponin level elevated.  Plan: 2/2 to possible  NSTEMI -followed by cardiologist - Not a candidate for medical or invasive therapy for NSTEMI due to ongoing bleed and TITA .As per Dr Mccrary -cannot exclude plaque rupture/thrombus formation although more likely there is significant chronic CAD with demand from severe anemia and TITA TRANSFER TO [facility] IS RECOMMENDED BY ANESTHESIOLOGIST , D/W CARD AND GI CONS ,ALL PHYSICIANS ARE IN AGREEMENT.     ·  Problem: Abnormal CT of the abdomen.  Plan: REPORT WAS D/W SON COURTNEY ,HE IS AWARE THAT PATIENT WILL REQUIRE COLONOSCOPY.     ·  Problem: Anemia.  Plan: SERIAL H/H ,TRANSFUSE PRBS , ANEMIA WORKUP AND STOOL FOR FOBT ORDERED ,GI AND HEM CONS CALLED .HOLD AGGRENOX ,TELEMETRY MONITORING.     ·  Problem: UC (ulcerative colitis).  Plan: continue home medications ,seen by GI CONSULT ,continue PPI.     Problem: Chronic kidney disease. Plan: serial bmp ,ins/outs ,nephrology eval called ,avoid nephrotoxins.    ·  Problem: HTN (hypertension).  Plan: hold lisinopril due to tita , continue current management and present  medications ,seen by cardiologist ,BP management as per Dr Mccrary.     ·  Problem: Delirium.     ·  Problem: Blepharitis of right eye.     Problem: Prophylactic measure. Plan; Gastrointestinal stress ulcer prophylaxis and DVT prophylaxis administrated.     TRANSFER TO [facility] WHEN THE BED IS AVAILABLE .SPOKE TO THE TRANSFER CENTER AND ACCEPTING MD DR.DAVINDER PHILLIPS .D/C SUMMARY COMPLETED ELECTRONICALLY AND TRANSFER PACKAGE IS COMPLETED.         Discharge from [facility] to Rehab on 7/31    This patient is an 84yoM with PMH of acoustic neuroma, hearing difficulty, Parkinson's disease, CKD stage 3, gout, htn, ulcerative colitis who was initally transferred brought from [facility] to [facility] for SOB, chest discomfort 3 days and black stool, and found to be anemic to Hgb of 6.7/Hct 20.9, elevated MCV of 103. TSH, B12 and folate WNL. Low total iron of 21, with TIBC 306. He was transfused 2 u PRBC.     CT abdomen was abnormal and found a fusiform ectasia of the abdominal aorta above the level of the aortic bifurcation, measuring 2.8cm- monitoring advised to evaluate for developing aortic aneurysm. Patient also found to have a Trace bilateral perinephric stranding, left inguinal hernia containing nonobstructed large bowel. Patient noted on right side of large bowel to have 2 areas of potential narrowing-lesions vs areas of spasm- which may represent colonic neoplasm. Patient was planned for EGD/colonoscopy at [facility]. Found to have elevation of troponin, peaking at 9.247, then downtrending. EKG on admission was notable for sinus HR 88, ST depressions in leads V2-C4 ProBNP was elevated to 6715. TTE found grossly normal LV fucntion, EF approx 55%, with possible inferoseptal wall, inferior lateral wall hypokinesis. Stage I diastolic dysfunction noted.    Patient transferred to [facility] for concern for NSTEMI with GIB on 7/18    Cardiology consulted for NSTEMI - - Patient with no chest pain or anginal symptoms, suspect elevated troponin are due to demand ischemia from GIB and renal disease. Echo - normal LV/RV function    - Continue statin, ASA 81mg (clearance by GI appreciated)    EPconsulted for Sinus pauses noted on tele 7/25 - patient asymptomatic, BP stable while on metoprolol- Hold AVN blockers, monitor for further episodes    Follow up appointment with cardiologist in 1 month    Gastroenterology consulted - given abnormal CT and hernia containing colon, Virtual colonoscopy was negative. No further GI bleed.    Long discussion with family, decision made to forgo upper gastrointestinal endoscopy at this time given improvement with proton pump inhibitor and holding a/c,  H/H stable and no GI contraindication to ASA. Tolerating Regular diet 85 yo male Acoustic neuroma ,[place] , ,Parkinson Dz, unsteady gait ,  Chronic kidney disease  ,Gout  ,HTN (hypertension)   ,UC (ulcerative colitis) , resident of [facility] brought for eval of SOB and black stools for several days. Found to have anemia and admitted for hemotransfusion and GI workup .Patient had colonoscopy 3 years ago and [place] and had some polyps removed ,he denies any recent hx of GIB or GI workup ,but admits black stools and lightheadedness ,sob ,worsening last few days .Found to have RANJANA elevation and seen by cardiologist Admitted  to telemetry unit for monitoring , send 3 sets of cardiac ensymes to rule out coronary event, obtain ECHO to evaluate LVEF, cardiology consult ,continue current management, O2 supply, anticoagulation plan as per cardiology  ,aggrenox placed on hold until GI clearance will be obtained Nephrology cons called ,IV hydration is administrated ,lisinopril held as per cardiologist recommendation .- ECG with above noted ST depressions    ·  Problem: GIB (gastrointestinal bleeding).  Plan: SERIAL CBC , PII BID , IV FLUIDS , GI CONSULT , HOLD AGGRENOX ,MONITOR H/H CLOSELY , S/p  2 U OF PRBCS ,EGD planned by GI consult TRANSFER TO [facility] IS RECOMMENDED BY ANESTHESIOLOGIST , D/W CARD AND GI CONS ,ALL PHYSICIANS ARE IN AGREEMENT.     ·  Problem: Troponin level elevated.  Plan: 2/2 to possible  NSTEMI -followed by cardiologist - Not a candidate for medical or invasive therapy for NSTEMI due to ongoing bleed and TITA .As per Dr Mccrary -cannot exclude plaque rupture/thrombus formation although more likely there is significant chronic CAD with demand from severe anemia and TITA TRANSFER TO [facility] IS RECOMMENDED BY ANESTHESIOLOGIST , D/W CARD AND GI CONS ,ALL PHYSICIANS ARE IN AGREEMENT.     ·  Problem: Abnormal CT of the abdomen.  Plan: REPORT WAS D/W SON COURTNEY ,HE IS AWARE THAT PATIENT WILL REQUIRE COLONOSCOPY.     ·  Problem: Anemia.  Plan: SERIAL H/H ,TRANSFUSE PRBS , ANEMIA WORKUP AND STOOL FOR FOBT ORDERED ,GI AND HEM CONS CALLED .HOLD AGGRENOX ,TELEMETRY MONITORING.     ·  Problem: UC (ulcerative colitis).  Plan: continue home medications ,seen by GI CONSULT ,continue PPI.     Problem: Chronic kidney disease. Plan: serial bmp ,ins/outs ,nephrology eval called ,avoid nephrotoxins.    ·  Problem: HTN (hypertension).  Plan: hold lisinopril due to tita , continue current management and present  medications ,seen by cardiologist ,BP management as per Dr Mccrary.     ·  Problem: Delirium.     ·  Problem: Blepharitis of right eye.     Problem: Prophylactic measure. Plan; Gastrointestinal stress ulcer prophylaxis and DVT prophylaxis administrated.     TRANSFER TO [facility] WHEN THE BED IS AVAILABLE .SPOKE TO THE TRANSFER CENTER AND ACCEPTING MD DR.DAVINDER PHILLIPS .D/C SUMMARY COMPLETED ELECTRONICALLY AND TRANSFER PACKAGE IS COMPLETED.         Discharge from [facility] to Rehab on 7/31    This patient is an 84yoM with PMH of acoustic neuroma, hearing difficulty, Parkinson's disease, CKD stage 3, gout, htn, ulcerative colitis who was initally transferred brought from [facility] to [facility] for SOB, chest discomfort 3 days and black stool, and found to be anemic to Hgb of 6.7/Hct 20.9, elevated MCV of 103. TSH, B12 and folate WNL. Low total iron of 21, with TIBC 306. He was transfused 2 u PRBC.     CT abdomen was abnormal and found a fusiform ectasia of the abdominal aorta above the level of the aortic bifurcation, measuring 2.8cm- monitoring advised to evaluate for developing aortic aneurysm. Patient also found to have a Trace bilateral perinephric stranding, left inguinal hernia containing nonobstructed large bowel. Patient noted on right side of large bowel to have 2 areas of potential narrowing-lesions vs areas of spasm- which may represent colonic neoplasm. Patient was planned for EGD/colonoscopy at [facility]. Found to have elevation of troponin, peaking at 9.247, then downtrending. EKG on admission was notable for sinus HR 88, ST depressions in leads V2-C4 ProBNP was elevated to 6715. TTE found grossly normal LV fucntion, EF approx 55%, with possible inferoseptal wall, inferior lateral wall hypokinesis. Stage I diastolic dysfunction noted.    Patient transferred to [facility] for concern for NSTEMI with GIB on 7/18    Cardiology consulted for NSTEMI - - Patient with no chest pain or anginal symptoms, suspect elevated troponin are due to demand ischemia from GIB and renal disease. Echo - normal LV/RV function    - Continue statin, ASA 81mg (clearance by GI appreciated)    EPconsulted for Sinus pauses noted on tele 7/25 - patient asymptomatic, BP stable while on metoprolol- Hold AVN blockers, monitor for further episodes    Follow up appointment with cardiologist in 1 month    Gastroenterology consulted - given abnormal CT and hernia containing colon, Virtual colonoscopy was negative. No further GI bleed.    Long discussion with family, decision made to forgo upper gastrointestinal endoscopy at this time given improvement with proton pump inhibitor and holding a/c,  H/H stable and no GI contraindication to ASA. Tolerating Regular diet    Hospital stay complicated by Confusion and Delirium - CTH negative Seroquel started. NO HALDOL due to Hx of Parkinsons. Mental staus now improved    Medically cleared for discharge

## 2019-07-17 NOTE — PROGRESS NOTE ADULT - ASSESSMENT
The patient is an 84 year old male with a history of HTN, UC, TIAs, Parkinson's who presents with GI bleed, TITA, NSTEMI.    Plan:  - Troponin peaked at 9.247. No need to trend further. Cannot exclude plaque rupture/thrombus formation although more likely there is significant chronic CAD with demand from severe anemia and TITA.  - Echocardiogram with grossly normal LV systolic function  - Hold all antiplatelet and anticoagulants  - Hold lisinopril. Consider alternative therapy due to TITA/CKD.  - Continue atorvastatin 20 mg daily  - Hemoglobin improved after transfusion. No further melena at this time.  - The patient is not a candidate for invasive cardiac work-up due to GI bleed and TITA/CKD. In fact, further work-up with cardiac catheterization will likely lead to deterioration as it will likely involve additional anticoagulants and antiplatelet agents.  - Further GI work-up to evaluate cause of bleed and prevent recurrence would be ideal to reduce the risk of a recurrent ischemic event. The patient is as optimized as possible from a cardiac perspective for endoscopic work-up. If deemed too high risk, alternative would be to restart aspirin and observe for signs of re-bleeding, with the risk that it may lead to an additional ischemic event. The patient is an 84 year old male with a history of HTN, UC, TIAs, Parkinson's who presents with GI bleed, TITA, NSTEMI.    Plan:  - Troponin peaked at 9.247. No need to trend further. Cannot exclude plaque rupture/thrombus formation although more likely there is significant chronic CAD with demand from severe anemia and TITA.  - Echocardiogram with grossly normal LV systolic function  - Hold all antiplatelet and anticoagulants  - Hold lisinopril. Consider alternative therapy due to TITA/CKD.  - Continue atorvastatin 20 mg daily  - Hemoglobin improved after transfusion. No further melena at this time.  - The patient is not a candidate for invasive cardiac work-up due to GI bleed and TITA/CKD. In fact, further work-up with cardiac catheterization will likely lead to deterioration as it will likely involve additional anticoagulants and antiplatelet agents.  - Further GI work-up to evaluate cause of bleed and prevent recurrence would be ideal to reduce the risk of a recurrent ischemic event. The patient is as optimized as possible from a cardiac perspective for endoscopic work-up as the improvement in hemoglobin has led to an improvement in ischemic markers (troponin and ST depressions on ECG) and resolution of cardiac symptoms. If deemed too high risk by anesthesiology, alternative would be to restart aspirin and observe for signs of re-bleeding, with the risk that it may lead to an additional ischemic event.

## 2019-07-17 NOTE — PROGRESS NOTE ADULT - ASSESSMENT
83 yo male Acoustic neuroma ,[place] , ,Parkinson Dz, unsteady gait ,  Chronic kidney disease  ,Gout  ,HTN (hypertension)   ,UC (ulcerative colitis) , resident of [facility] brought for eval of SOB and black stools for several days. Found to have anemia and admitted for hemotransfusion and GI workup .Patient had colonoscopy 3 years ago and [place] and had some polyps removed ,he denies any recent hx of GIB or GI workup ,but admits black stools and lightheadedness ,sob ,worsening last few days .Found to have RANJANA elevation and seen by cardiologist Admitted  to telemetry unit for monitoring , send 3 sets of cardiac ensymes to rule out coronary event, obtain ECHO to evaluate LVEF, cardiology consult ,continue current management, O2 supply, anticoagulation plan as per cardiology  ,aggrenox placed on hold until GI clearance will be obtained Nephrology cons called ,IV hydration is administrated ,lisinopril held as per cardiologist recommendation . (15 Jul 2019 08:57)

## 2019-07-17 NOTE — PROGRESS NOTE ADULT - ASSESSMENT
84m w/ parkinsons, ulcerative colitis (untreated) a/w dark stools and anemia    also noted troponin elevation, acute on chronic renal failure   troponins now down slightly,   received 2 unit prbc since admission   no bleeding overnight, pt comfortable.     Holding aggrenox for time being   CT w/ abnml in colon ----? neoplasm   will need EGD and colonoscopy to evaluate further   d/w anesthesia, cardiology and medicine- will arrange for transfer for further care (procedures non urgent as pt stable w/o persistent bleeding)  -clear liquids   will d/w family

## 2019-07-17 NOTE — PROGRESS NOTE ADULT - SUBJECTIVE AND OBJECTIVE BOX
PROGRESS NOTE  Patient is a 84y old  Male who presents with a chief complaint of SOB , anemia ,black stools (17 Jul 2019 09:25)  Chart and available morning labs /imaging are reviewed electronically , urgent issues addressed . More information  is being added upon completion of rounds , when more information is collected and management discussed with consultants , medical staff and social service/case management on the floor     OVERNIGHT  No new issues reported by medical staff ,no GIB/bloody stools  x 24 hrs  . All above noted Patient is resting in a bed comfortably .Confused ,poor mentation ,patient keeps looking for his son Colin behind his bed  .No distress noted   I told him about transfer and that I discussed it with son Abdifatah on his cell this morning [phone] Spoke to transfer center and transfer initiated Spoke to accepting MD  and case was presented   HPI:  85 yo male Acoustic neuroma ,[place] , ,Parkinson Dz, unsteady gait ,  Chronic kidney disease  ,Gout  ,HTN (hypertension)   ,UC (ulcerative colitis) , resident of [facility] brought for eval of SOB and black stools for several days. Found to have anemia and admitted for hemotransfusion and GI workup .Patient had colonoscopy 3 years ago and [place] and had some polyps removed ,he denies any recent hx of GIB or GI workup ,but admits black stools and lightheadedness ,sob ,worsening last few days .Found to have RANJANA elevation and seen by cardiologist Admitted  to telemetry unit for monitoring , send 3 sets of cardiac ensymes to rule out coronary event, obtain ECHO to evaluate LVEF, cardiology consult ,continue current management, O2 supply, anticoagulation plan as per cardiology  ,aggrenox placed on hold until GI clearance will be obtained Nephrology cons called ,IV hydration is administrated ,lisinopril held as per cardiologist recommendation . (15 Jul 2019 08:57)    PAST MEDICAL & SURGICAL HISTORY:  UC (ulcerative colitis)  HTN (hypertension)  UC (ulcerative colitis)  HTN (hypertension)  Gout  Chronic kidney disease  Acoustic neuroma  No significant past surgical history  No significant past surgical history      MEDICATIONS  (STANDING):  artificial  tears Solution 1 Drop(s) Both EYES two times a day  atorvastatin 20 milliGRAM(s) Oral at bedtime  carbidopa/levodopa  25/100 1 Tablet(s) Oral three times a day  clonazePAM  Tablet 0.5 milliGRAM(s) Oral at bedtime  dextrose 5% + sodium chloride 0.45%. 1000 milliLiter(s) (50 mL/Hr) IV Continuous <Continuous>  docusate sodium 100 milliGRAM(s) Oral two times a day  lactobacillus acidophilus 1 Tablet(s) Oral daily  latanoprost 0.005% Ophthalmic Solution 1 Drop(s) Both EYES at bedtime  metoprolol tartrate 12.5 milliGRAM(s) Oral every 12 hours  multivitamin 1 Tablet(s) Oral daily  pantoprazole  Injectable 40 milliGRAM(s) IV Push every 12 hours  sulfaSALAzine 500 milliGRAM(s) Oral two times a day  timolol 0.25% Solution 1 Drop(s) Both EYES two times a day  tobramycin 0.3% Ophthalmic Solution 1 Drop(s) Right EYE two times a day    MEDICATIONS  (PRN):  acetaminophen   Tablet .. 650 milliGRAM(s) Oral every 6 hours PRN Temp greater or equal to 38C (100.4F), Mild Pain (1 - 3)  melatonin 3 milliGRAM(s) Oral at bedtime PRN Insomnia      OBJECTIVE    T(C): 35.8 (07-17-19 @ 08:00), Max: 36.8 (07-16-19 @ 15:39)  HR: 54 (07-17-19 @ 08:06) (54 - 74)  BP: 138/74 (07-17-19 @ 08:06) (105/67 - 160/76)  RR: 16 (07-17-19 @ 08:06) (15 - 27)  SpO2: 100% (07-17-19 @ 08:06) (94% - 100%)  Wt(kg): --  I&O's Summary    16 Jul 2019 07:01  -  17 Jul 2019 07:00  --------------------------------------------------------  IN: 1640 mL / OUT: 1500 mL / NET: 140 mL          REVIEW OF SYSTEMS:  ROS is unobtainable due to dementia and confusion     PHYSICAL EXAM:  Appearance: NAD. VS past 24 hrs -as above   HEENT:   Moist oral mucosa. Conjunctiva clear b/l. R  EYE BLEPHARITIS   Neck : supple  Respiratory: Lungs CTAB.  Gastrointestinal:  Soft, nontender. No rebound. No rigidity. BS present	  Cardiovascular: RRR ,S1S2 present  Neurologic: Non-focal. Moving all extremities. AAO X 1-2   Extremities: No edema. No erythema. No calf tenderness.  Skin: No rashes, No ecchymoses, No cyanosis.	  wounds ,skin lesions-See skin assesment flow sheet   LABS:                        9.2    9.70  )-----------( 202      ( 17 Jul 2019 06:32 )             28.7     07-17    143  |  110<H>  |  34<H>  ----------------------------<  143<H>  4.0   |  22  |  1.90<H>    Ca    8.7      17 Jul 2019 06:32      CAPILLARY BLOOD GLUCOSE              RADIOLOGY & ADDITIONAL TESTS:< from: CT Abdomen and Pelvis w/ Oral Cont (07.16.19 @ 13:48) >  EXAM:  CT ABDOMEN AND PELVIS OC                                  PROCEDURE DATE:  07/16/2019          INTERPRETATION:  Clinical information: Anemia, history of colitis.    Axial images obtained, coronal and sagittal images computer reformatted.   Oral contrast material administered. IV contrast material not   administered limiting evaluation.    No prior studies present for comparison    Minimal right pleural effusion with the septal thickening at the right   lung base. Minimal scarring or atelectasis at the left lung base. The   left hemidiaphragm is elevated, nonspecific.    Coronary artery calcifications noted.    Hepatic parenchyma homogeneous. The spleen is not enlarged. No adrenal < from: CT Abdomen and Pelvis w/ Oral Cont (07.16.19 @ 13:48) >  EXAM:  CT ABDOMEN AND PELVIS OC                                  PROCEDURE DATE:  07/16/2019          INTERPRETATION:  Clinical information: Anemia, history of colitis.    Axial images obtained, coronal and sagittal images computer reformatted.   Oral contrast material administered. IV contrast material not   administered limiting evaluation.    No prior studies present for comparison    Minimal right pleural effusion with the septal thickening at the right   lung base. Minimal scarring or atelectasis at the left lung base. The   left hemidiaphragm is elevated, nonspecific.    Coronary artery calcifications noted.    Hepatic parenchyma homogeneous. The spleen is not enlarged. No adrenal   lesions evident. Unenhanced pancreas unremarkable. The gallbladder is   filled.  No ascites. No retroperitoneal lymphadenopathy. No biliary   ductal dilatation.    Fusiform, ectasia of the abdominal aorta just above the level of the   aortic bifurcation measuring 2.8 cm. Advise monitoring to evaluate for   developing aortic aneurysm.    No evidence of right hydronephrosis. A right renal cyst is present medial   aspect upper pole.     Left kidney demonstrates a cyst, lateral mid polar region. No left   hydronephrosis. Trace bilateral perinephric stranding.    No evidence of bowel obstruction. No appendicitis. A left inguinal hernia   is present containing nonobstructed large bowel. Additionally, on the   right side of the large bowel there are 2 areas of the potential   narrowing-lesions versus areas of spasm. Advise colonoscopy evaluation to   exclude occult colonic neoplasms.    The rectum is distended with stool. Urinary bladder contains small   quantity of urine, no stones are evident. The prostate is markedly   enlarged. There is no evidence of free pelvic fluid. Fat-containing right   inguinal hernia present. No acute osseous abnormalities. Multilevel disc   degenerative disease lower lumbar region. Grade 1 anterolisthesis L4 over   L5.    IMPRESSION:  1. No evidence of hydronephrosis or bowel obstruction  2. Left inguinal hernia containing nonobstructed large bowel  3. There are 2 areas of potential narrowing-lesion versus spasm on the   right side of the large bowel. Advise colonoscopic evaluation.    < end of copied text >    lesions evident. Unenhanced pancreas unremarkable. The gallbladder is   filled.  No ascites. No retroperitoneal lymphadenopathy. No biliary   ductal dilatation.    Fusiform, ectasia of the abdominal aorta just above the level of the   aortic bifurcation measuring 2.8 cm. Advise monitoring to evaluate for   developing aortic aneurysm.    No evidence of right hydronephrosis. A right renal cyst is present medial   aspect upper pole.     Left kidney demonstrates a cyst, lateral mid polar region. No left   hydronephrosis. Trace bilateral perinephric stranding.    No evidence of bowel obstruction. No appendicitis. A left inguinal hernia   is present containing nonobstructed large bowel. Additionally, on the   right side of the large bowel there are 2 areas of the potential   narrowing-lesions versus areas of spasm. Advise colonoscopy evaluation to   exclude occult colonic neoplasms.    The rectum is distended with stool. Urinary bladder contains small   quantity of urine, no stones are evident. The prostate is markedly   enlarged. There is no evidence of free pelvic fluid. Fat-containing right   inguinal hernia present. No acute osseous abnormalities. Multilevel disc   degenerative disease lower lumbar region. Grade 1 anterolisthesis L4 over   L5.    IMPRESSION:  1. No evidence of hydronephrosis or bowel obstruction  2. Left inguinal hernia containing nonobstructed large bowel  3. There are 2 areas of potential narrowing-lesion versus spasm on the   right side of the large bowel. Advise colonoscopic evaluation.    < end of copied text >     reviewed elctronically  Patient was seen and examined on a day of discharge . Plan of care , discharge medications and recommendations discussed with consultants and clearance for discharge obtained .Social service , case management  and medical staff are aware of plan. Family is notified. Discharge summary  is  prepared electronically OOBTC/PT .Advance care planning was d/w the family.Pain is accessed and addresed.Pt was screened for signs of clinical depression .Appropriate referral made.Risk of falls accessed.Fall prevention d/w family .Pt is screened for tobacco and etoh,counseling was done for etoh and tobacco cessation.Use of narcotic pain meds was discussed.Pt is advised to use narcotic meds  wisely and to avoid overusing them.Plan of care d/w family and all questions answered to best of my knowledge 75 minutes spent on this visit, 50% visit time spent in care co-ordination with other attendings and counselling patient  I have discussed care plan with patient and HCP,expressed understanding of problems treatment and their effect and side effects, alternatives in detail,I have asked if they have any questions and concerns and appropriately addressed them to best of my ability Patient was seen and examined on a day of discharge . Plan of care , discharge medications and recommendations discussed with consultants and clearance for discharge obtained .Social service , case management  and medical staff are aware of plan. Family is notified. Discharge summary  is  prepared electronically -please see separate attached  document

## 2019-07-17 NOTE — DIETITIAN INITIAL EVALUATION ADULT. - PROBLEM SELECTOR PLAN 3
SERIAL CBC , PII BID , IV FLUIDS , GI CONSULT , HOLD AGGRENOX ,MONITOR H/H CLOSELY , SEND H/H AFTER 2 U OF PRBCS

## 2019-07-17 NOTE — DISCHARGE NOTE PROVIDER - CARE PROVIDER_API CALL
Kirby Ayala)  Gastroenterology  [address]  [place] [address]  Phone: [phone]  Fax: [phone]  Follow Up Time: 1 week    Keyon Higgins)  Internal Medicine  [address]  [place] [address]  Phone: [phone]  Fax: [phone]  Follow Up Time: Routine Gage Bernstein (DO)  Gastroenterology; Internal Medicine  [address] [place] [address]  Phone: [phone]  Fax: [phone]  Follow Up Time: 2 weeks    Jessica Terry)  Cardiovascular Disease; Internal Medicine  [address], [address]  [place] [address]  Phone: [phone]  Fax: [phone]  Follow Up Time: 1 month    Pahlavan, Mohsen (MD)  Nephrology  [address], [address] [place] [address]  Phone: [phone]  Fax: [phone]  Follow Up Time: 2 weeks

## 2019-07-17 NOTE — DISCHARGE NOTE PROVIDER - CARE PROVIDERS DIRECT ADDRESSES
,DirectAddress_Unknown,DirectAddress_Unknown ,DirectAddress_Unknown,DirectAddress_Unknown,[email or web address]

## 2019-07-17 NOTE — PROGRESS NOTE ADULT - PROBLEM SELECTOR PLAN 1
SERIAL CBC , PII BID , IV FLUIDS , GI CONSULT , HOLD AGGRENOX ,MONITOR H/H CLOSELY , S/p  2 U OF PRBCS ,EGD planned by GI consult TRANSFER TO [facility] IS RECOMMENDED BY ANESTHESIOLOGIST , D/W CARD AND GI CONS ,ALL PHYSICIANS ARE IN AGREEMENT

## 2019-07-17 NOTE — PROGRESS NOTE ADULT - ASSESSMENT
83 yo male Acoustic neuroma ,[place] , ,Parkinson Dz, unsteady gait ,  Chronic kidney disease  ,Gout  ,HTN (hypertension)   ,UC (ulcerative colitis) , resident of [facility] brought for eval of SOB and black stools for several days. Found to have anemia and admitted for hemotransfusion and GI workup .Patient had colonoscopy 3 years ago and [place] and had some polyps removed ,he denies any recent hx of GIB or GI workup ,but admits black stools and lightheadedness ,sob ,worsening last few days .Found to have RANJANA elevation and seen by cardiologist Admitted  to telemetry unit for monitoring , send 3 sets of cardiac ensymes to rule out coronary event, obtain ECHO to evaluate LVEF, cardiology consult ,continue current management, O2 supply, anticoagulation plan as per cardiology  ,aggrenox placed on hold until GI clearance will be obtained Nephrology cons called ,IV hydration is administrated ,lisinopril held as per cardiologist recommendation .- ECG with above noted ST depressions

## 2019-07-17 NOTE — PROGRESS NOTE ADULT - PROBLEM SELECTOR PLAN 1
CHRONIC KIDNEY DISEASE, STAGE 3: increase water intake , continue with iv fluid   Serum creatinine is stable at improved 1.9, approximating a GFR of is decreased  ml/min.   There is no progression.  No uremic symptoms. No evidence of  worsening  Anemia. Fluid status stable.   Will continue to avoid nephrotoxic drugs.  Patient remains asymptomatic.  Continue current therapy.

## 2019-07-17 NOTE — DIETITIAN INITIAL EVALUATION ADULT. - OTHER INFO
83 yo male Acoustic neuroma ,[place] , ,Parkinson Dz, unsteady gait ,  Chronic kidney disease  ,Gout  ,HTN   ,UC , resident of [facility] brought for eval of SOB and black stools for several days. Found to have anemia and admitted for hemotransfusion and GI workup including EGD/colonoscopy. Per rounds, anesthesiology recommends transfer to [facility] for this.  Spoke with pt/son: Son reports ever since pt started living at [facility] he has gained wt. [facility] EMR wt from April 2018 174# Admit wt 181# pt with +1 edema to abkles and sacral p/u stage I. Physically, pt noted with some mild/moderate wasting but appears associated with advanced age. Pt has been tolerating clear liquids including ensure clear without trouble.

## 2019-07-17 NOTE — PROGRESS NOTE ADULT - SUBJECTIVE AND OBJECTIVE BOX
Patient is a 84y Male whom presented to the hospital with ckd and pb      PAST MEDICAL & SURGICAL HISTORY:  UC (ulcerative colitis)  HTN (hypertension)  UC (ulcerative colitis)  HTN (hypertension)  Gout  Chronic kidney disease  Acoustic neuroma  No significant past surgical history  No significant past surgical history      MEDICATIONS  (STANDING):  atorvastatin 20 milliGRAM(s) Oral at bedtime  carbidopa/levodopa  25/100 1 Tablet(s) Oral three times a day  clonazePAM  Tablet 0.5 milliGRAM(s) Oral at bedtime  docusate sodium 100 milliGRAM(s) Oral two times a day  lactobacillus acidophilus 1 Tablet(s) Oral daily  latanoprost 0.005% Ophthalmic Solution 1 Drop(s) Both EYES at bedtime  multivitamin 1 Tablet(s) Oral daily  pantoprazole  Injectable 40 milliGRAM(s) IV Push every 12 hours  sodium chloride 0.9%. 1000 milliLiter(s) (75 mL/Hr) IV Continuous <Continuous>  sulfaSALAzine 500 milliGRAM(s) Oral two times a day  timolol 0.25% Solution 1 Drop(s) Both EYES two times a day      Allergies    No Known Allergies    Intolerances        SOCIAL HISTORY:  Denies ETOh,Smoking,     FAMILY HISTORY:      REVIEW OF SYSTEMS:    CONSTITUTIONAL: No weakness, fevers or chills  RESPIRATORY: No cough, wheezing, hemoptysis; pos  shortness of breath  CARDIOVASCULAR: No chest pain or palpitations  GASTROINTESTINAL: No abdominal or epigastric pain. No nausea, vomiting,     No diarrhea or constipation. pos  melena   GENITOURINARY: No dysuria, frequency or hematuria  SKIN: dry                                               9.2    9.70  )-----------( 202      ( 17 Jul 2019 06:32 )             28.7       CBC Full  -  ( 17 Jul 2019 06:32 )  WBC Count : 9.70 K/uL  RBC Count : 2.92 M/uL  Hemoglobin : 9.2 g/dL  Hematocrit : 28.7 %  Platelet Count - Automated : 202 K/uL  Mean Cell Volume : 98.3 fl  Mean Cell Hemoglobin : 31.5 pg  Mean Cell Hemoglobin Concentration : 32.1 gm/dL  Auto Neutrophil # : x  Auto Lymphocyte # : x  Auto Monocyte # : x  Auto Eosinophil # : x  Auto Basophil # : x  Auto Neutrophil % : x  Auto Lymphocyte % : x  Auto Monocyte % : x  Auto Eosinophil % : x  Auto Basophil % : x      07-17    143  |  110<H>  |  34<H>  ----------------------------<  143<H>  4.0   |  22  |  1.90<H>    Ca    8.7      17 Jul 2019 06:32        CAPILLARY BLOOD GLUCOSE          Vital Signs Last 24 Hrs  T(C): 36.8 (17 Jul 2019 16:30), Max: 36.8 (17 Jul 2019 16:30)  T(F): 98.2 (17 Jul 2019 16:30), Max: 98.2 (17 Jul 2019 16:30)  HR: 78 (17 Jul 2019 18:00) (54 - 78)  BP: 161/83 (17 Jul 2019 18:00) (105/67 - 161/83)  BP(mean): 105 (17 Jul 2019 18:00) (74 - 121)  RR: 33 (17 Jul 2019 18:00) (13 - 33)  SpO2: 99% (17 Jul 2019 18:00) (93% - 100%)                PHYSICAL EXAM:    Constitutional: NAD  HEENT: conjunctive   clear   Neck:  No JVD  Respiratory: decrease bs b/l   Cardiovascular: S1 and S2  Gastrointestinal: BS+, soft, NT/ND  Extremities: trace  peripheral edema  Neurological:  no focal deficits  Psychiatric: normal affect  Skin: dry  Access: Not applicable

## 2019-07-17 NOTE — DIETITIAN INITIAL EVALUATION ADULT. - PROBLEM SELECTOR PLAN 5
hold lisinopril due to pb , continue current management and present  medications ,seen by cardiologist

## 2019-07-17 NOTE — PROGRESS NOTE ADULT - SUBJECTIVE AND OBJECTIVE BOX
Chief Complaint:        INTERVAL HPI/OVERNIGHT EVENTS:    no significant events overnight reported   no bleeding, hct normal    MEDICATIONS  (STANDING):  atorvastatin 20 milliGRAM(s) Oral at bedtime  carbidopa/levodopa  25/100 1 Tablet(s) Oral three times a day  clonazePAM  Tablet 0.5 milliGRAM(s) Oral at bedtime  dextrose 5% + sodium chloride 0.45%. 1000 milliLiter(s) (50 mL/Hr) IV Continuous <Continuous>  docusate sodium 100 milliGRAM(s) Oral two times a day  lactobacillus acidophilus 1 Tablet(s) Oral daily  latanoprost 0.005% Ophthalmic Solution 1 Drop(s) Both EYES at bedtime  metoprolol tartrate 12.5 milliGRAM(s) Oral every 12 hours  multivitamin 1 Tablet(s) Oral daily  pantoprazole  Injectable 40 milliGRAM(s) IV Push every 12 hours  sulfaSALAzine 500 milliGRAM(s) Oral two times a day  timolol 0.25% Solution 1 Drop(s) Both EYES two times a day    MEDICATIONS  (PRN):  acetaminophen   Tablet .. 650 milliGRAM(s) Oral every 6 hours PRN Temp greater or equal to 38C (100.4F), Mild Pain (1 - 3)  melatonin 3 milliGRAM(s) Oral at bedtime PRN Insomnia            Vital Signs Last 24 Hrs  T(C): 35.8 (17 Jul 2019 08:00), Max: 36.8 (16 Jul 2019 15:39)  T(F): 96.5 (17 Jul 2019 08:00), Max: 98.3 (16 Jul 2019 15:39)  HR: 54 (17 Jul 2019 08:06) (54 - 74)  BP: 138/74 (17 Jul 2019 08:06) (105/67 - 160/76)  BP(mean): 92 (17 Jul 2019 08:06) (74 - 121)  RR: 16 (17 Jul 2019 08:06) (15 - 27)  SpO2: 100% (17 Jul 2019 08:06) (94% - 100%)    Physical exam:    abd soft, non tender  cv s1s2  chest air entry        LABS:                        9.2    9.70  )-----------( 202      ( 17 Jul 2019 06:32 )             28.7     07-17    143  |  110<H>  |  34<H>  ----------------------------<  143<H>  4.0   |  22  |  1.90<H>    Ca    8.7      17 Jul 2019 06:32            RADIOLOGY & ADDITIONAL TESTS:

## 2019-07-17 NOTE — PROGRESS NOTE ADULT - SUBJECTIVE AND OBJECTIVE BOX
Patient chart was reviewed Patient has not been examined yet but will be done so later today and following that  the final note will be entered in the space below Workup and management orders based on current assessment have been entered to expedite patient care  Nursing notified for stat orders as applicable Meanwhile please refer to my previous Pulmonary Critical Care notes on this patient or call me directly   Pt cleared for procedures planned if Cardio agrees ARVIND COLLADO [facility] S [phone]   1935 DOA 7/15/2019 DR VICKEY MACK   ALLERGY      nka   CONTACT         son Abdifatah MOLINA [phone] selv [phone] carleen MOLINA [phone]    Initial evaluation/Pulmonary Critical Care consultation requested on 2019   by Dr Mack  from Dr Mao   Patient examined chart reviewed    HOSPITAL ADMISSION   PATIENT CAME  FROM (if information available)        TYPE OF VISIT      Subsequent Pulmonary followup     REASON FOR VISIT  PLEASE SEE PROBLEM LIST/ASSESSMENTAND RECOMMENDATIONS         VITALS/LABS       2019 afeb62 147/69 27 98% 82.5   2019 W 9.7 Hb 9.2 Plt 202 Na 143 K 4 CO2 22 Cr 1.9   2019 afeb 74 160/76 16 97% 788   2019 W 7.5 Hb 8.6 Plt 170 Na 143 K 3.6 CO2 20 Cr 2.3   2019 ctap oc 1) No hydro 2) No obstrn 3) lih 4) two areas of pptential narrowing lesion v spasm r colon colonoscopy recommended   7/15/2019 echo n lvsf Possible inferoseptal inferior lateral wall hypokinesia dd1 mild mr   7/15/2019 CXR mild prominence is markings bases Flat diaphragm Emphysema Tr blunting cp angles       PATIENT ARVIND COLLADO [facility] S [phone]   1935 DOA 7/15/2019 DR VICKEY MACK         MEDICATIONS      IV   D5 1/2 NS 50 (7/15)   CAD  atorvastat 20 (7/15)   Metoprolol 12.5x2 ()  PARKINSONS   sinemet 25 1000x3 (7/15)   ANXIETY   Clonazepam 0.5   ULCERATIVE COLITIS   Sulfadiazine 500.2 (7/15)   GI BL   protonix 40.2 (7/15)     GLOBAL ISSUE/BEST PRACTICE:      PROBLEM: HOB elevation:   y            PROBLEM: Stress ulcer proph:    protonix 40.2 (7/15)                       PROBLEM: VTE prophylaxis:     No pharmac pplx bec of gi bl (7/15)   PROBLEM: Glycemic control:    na  PROBLEM: Nutrition:   clear liq (7/15) npo after mn ()   PROBLEM: Advanced directive: na     PROBLEM: Allergies:  na    REVIEW OF SYMPTOMS     NOTE Changess if any  in ROS and PE are updated in daily HOSPITAL COURSE below      Able to give ROS  Yes     RELIABLE No   CONSTITUTIONAL Weakness Yes  Chills No Vision changes No  ENDOCRINE No unexplained hair loss No heat or cold intolerance    ALLERGY No hives  Sore throat No   RESP Coughing blood no  Shortness of breath YES   NEURO No Headache  Confusion Pain neck No   CARDIAC No Chest pain No Palpitations   GI No Pain abdomen NO   Vomiting NO     PHYSICAL EXAM    HEENT Unremarkable PERRLA atraumatic   RESP Fair air entry EXP prolonged    Harsh breath sound Resp distres mild   CARDIAC S1 S2 No S3     NO JVD    ABDOMEN SOFT BS PRESENT NOT DISTENDED No hepatosplenomegaly PEDAL EDEMA present No calf tenderness  NO rash   GENERAL Not TOXIC looking

## 2019-07-17 NOTE — PROGRESS NOTE ADULT - ASSESSMENT
Patient description                                84m w/ parkinsons, acoustic neuroma hytn  ulcerative colitis (untreated) a/w dark stools and anemia also noted troponin elevation, acute on chronic renal failure   troponins now >8, received 2 unit prbc   Holding aggrenox  GI flood planned for 7/17 Pt [facility]16/2019 and Pulm critical care consulted 7/16/2019     Hospital course   7/17/2019 Pt cleared for gi procedures if Cardio agrees   7/16/2019 pulm consulted  Tr to [facility] for boarder   7/15/2019 2 u prbc given                 PROBLEM/ASSESSMENT/RECOMMENDATIONS (A/R)       COPD   7/15/2019 CXR mild prominence is markings bases Flat diaphragm Emphysema Tr blunting cp angles   A/R Cl stable   MI   7/15-7/16 Tr 1 5.7-8.2-9.2   A/R On Metoprolol 12.5x2 (7/16) On statins Cannot use ac or acei for gi bleed and tita   HYPERTENSION   7/16/2019 160/76  CHF   7/15/2019 echo n lvsf Possible inferoseptal inferior lateral wall hypokinesia dd1 mild mr   A/R Monitor volume status         GI BLEED   7/15 sob pos   ULCERATIVE COLITIS   LESION COLON   7/16/2019 ctap oc 1) No hydro 2) No obstrn 3) lih 4) two areas of pptential narrowing lesion v spasm r colon colonoscopy recommended   A/R GI flood in progress  7/17/2019 Cleared from Pulm standpoint if ok with Cardio (for GI procedures)   ANEMIA   7/15-7/15-7/16-7/16-7/17   Hb 6.7-7.6-8.0 -8.6-9.2   7/16/2019 Retic 4.5 Fe 27 (d) %sat 9 (d) uibc 279 b12 fa n   A/R Monitor Hb Transfuse if below 7  TRASNFUSIONS   7/15/2019 2 u prbc   TITA   7/15-7/16-7/17 Cr 2.7-2.3-1.9  A/R Monitor serially ( Np hydro on ctap 7/15)       TIME SPENT Over 25 minutes aggregate care time spent on encounter; activities included   direct pt care, counseling and/or coordinating care reviewing notes, lab data/ imaging , discussion with multidisciplinary team/ pt /family. Risks, benefits, alternatives  discussed in detail.

## 2019-07-17 NOTE — DISCHARGE NOTE PROVIDER - PROVIDER TOKENS
PROVIDER:[TOKEN:[[id number]],FOLLOWUP:[1 week]],PROVIDER:[TOKEN:[[id number]],FOLLOWUP:[Routine]] PROVIDER:[TOKEN:[[id number]],FOLLOWUP:[2 weeks]],PROVIDER:[TOKEN:[[id number]],FOLLOWUP:[1 month]],PROVIDER:[TOKEN:[[id number]],FOLLOWUP:[2 weeks]]

## 2019-07-17 NOTE — DISCHARGE NOTE PROVIDER - NSDCCPCAREPLAN_GEN_ALL_CORE_FT
PRINCIPAL DISCHARGE DIAGNOSIS  Diagnosis: GIB (gastrointestinal bleeding)  Assessment and Plan of Treatment: GIB (gastrointestinal bleeding)      SECONDARY DISCHARGE DIAGNOSES  Diagnosis: ACS (acute coronary syndrome)  Assessment and Plan of Treatment:     Diagnosis: Anemia  Assessment and Plan of Treatment:     Diagnosis: Delirium  Assessment and Plan of Treatment: Delirium    Diagnosis: Chronic kidney disease  Assessment and Plan of Treatment: Chronic kidney disease    Diagnosis: UC (ulcerative colitis)  Assessment and Plan of Treatment: UC (ulcerative colitis)    Diagnosis: Abnormal CT of the abdomen  Assessment and Plan of Treatment: Abnormal CT of the abdomen    Diagnosis: Blepharitis of right eye  Assessment and Plan of Treatment: Blepharitis of right eye    Diagnosis: Gout  Assessment and Plan of Treatment: Gout PRINCIPAL DISCHARGE DIAGNOSIS  Diagnosis: GIB (gastrointestinal bleeding)  Assessment and Plan of Treatment: Abnormal CT and hernia containing colon, Virtual colonoscopy was negative. No further GI bleed.  Long discussion with family, decision made to forgo upper gastrointestinal endoscopy at this time given improvement with proton pump inhibitor and holding a/c,  H/H stable and no GI contraindication to ASA. Tolerating Regular diet        SECONDARY DISCHARGE DIAGNOSES  Diagnosis: NSTEMI (non-ST elevated myocardial infarction)  Assessment and Plan of Treatment: Cardiology consulted for NSTEMI  - Patient with no chest pain or anginal symptoms, suspect elevated troponin are due to demand ischemia from GIB and renal disease. Echo - normal LV/RV function  - Continue statin, ASA 81mg (clearance by GI appreciated)    Diagnosis: Sinus pause  Assessment and Plan of Treatment: EPconsulted for Sinus pauses noted on tele 7/25 - patient asymptomatic, BP stable while on metoprolol- Hold AVN blockers,   Follow up appointment with cardiologist in 1 month    Diagnosis: Blepharitis of right eye  Assessment and Plan of Treatment: Resolved    Diagnosis: UC (ulcerative colitis)  Assessment and Plan of Treatment: Conitnanne current management - Follow up GI as outpatient    Diagnosis: Chronic kidney disease  Assessment and Plan of Treatment: Avoid taking (NSAIDs) - (ex: Ibuprofen, Advil, Celebrex, Naprosyn)  Avoid taking any nephrotoxic agents (can harm kidneys) - Intravenous contrast for diagnostic testing, combination cold medications.  Have all medications adjusted for your renal function by your Health Care Provider.  Blood pressure control is important.  Take all medication as prescribed.      Diagnosis: Delirium  Assessment and Plan of Treatment: Resolved    Diagnosis: Anemia  Assessment and Plan of Treatment: Anemia of chronic disease  Follow up with renal in 1 week

## 2019-07-18 DIAGNOSIS — G20 PARKINSON'S DISEASE: ICD-10-CM

## 2019-07-18 DIAGNOSIS — I21.4 NON-ST ELEVATION (NSTEMI) MYOCARDIAL INFARCTION: ICD-10-CM

## 2019-07-18 DIAGNOSIS — N17.9 ACUTE KIDNEY FAILURE, UNSPECIFIED: ICD-10-CM

## 2019-07-18 DIAGNOSIS — H40.9 UNSPECIFIED GLAUCOMA: ICD-10-CM

## 2019-07-18 LAB
ALBUMIN SERPL ELPH-MCNC: 3 G/DL — LOW (ref 3.3–5)
ALP SERPL-CCNC: 151 U/L — HIGH (ref 30–120)
ALT FLD-CCNC: 10 U/L DA — SIGNIFICANT CHANGE UP (ref 10–60)
ANION GAP SERPL CALC-SCNC: 11 MMOL/L — SIGNIFICANT CHANGE UP (ref 5–17)
AST SERPL-CCNC: 22 U/L — SIGNIFICANT CHANGE UP (ref 10–40)
BILIRUB SERPL-MCNC: 0.6 MG/DL — SIGNIFICANT CHANGE UP (ref 0.2–1.2)
BUN SERPL-MCNC: 23 MG/DL — SIGNIFICANT CHANGE UP (ref 7–23)
CALCIUM SERPL-MCNC: 8.6 MG/DL — SIGNIFICANT CHANGE UP (ref 8.4–10.5)
CEA SERPL-MCNC: 3.2 NG/ML — SIGNIFICANT CHANGE UP (ref 0–3.8)
CHLORIDE SERPL-SCNC: 110 MMOL/L — HIGH (ref 96–108)
CO2 SERPL-SCNC: 23 MMOL/L — SIGNIFICANT CHANGE UP (ref 22–31)
CREAT SERPL-MCNC: 1.76 MG/DL — HIGH (ref 0.5–1.3)
GLUCOSE SERPL-MCNC: 126 MG/DL — HIGH (ref 70–99)
HCT VFR BLD CALC: 29.4 % — LOW (ref 39–50)
HGB BLD-MCNC: 9.4 G/DL — LOW (ref 13–17)
INR BLD: 1.2 RATIO — HIGH (ref 0.88–1.16)
MCHC RBC-ENTMCNC: 31.5 PG — SIGNIFICANT CHANGE UP (ref 27–34)
MCHC RBC-ENTMCNC: 32 GM/DL — SIGNIFICANT CHANGE UP (ref 32–36)
MCV RBC AUTO: 98.7 FL — SIGNIFICANT CHANGE UP (ref 80–100)
NRBC # BLD: 0 /100 WBCS — SIGNIFICANT CHANGE UP (ref 0–0)
PHOSPHATE SERPL-MCNC: 4 MG/DL — SIGNIFICANT CHANGE UP (ref 2.5–4.5)
PLATELET # BLD AUTO: 216 K/UL — SIGNIFICANT CHANGE UP (ref 150–400)
POTASSIUM SERPL-MCNC: 4.2 MMOL/L — SIGNIFICANT CHANGE UP (ref 3.5–5.3)
POTASSIUM SERPL-SCNC: 4.2 MMOL/L — SIGNIFICANT CHANGE UP (ref 3.5–5.3)
PROT SERPL-MCNC: 5.9 G/DL — LOW (ref 6–8.3)
PROTHROM AB SERPL-ACNC: 13.2 SEC — HIGH (ref 10–12.9)
RBC # BLD: 2.98 M/UL — LOW (ref 4.2–5.8)
RBC # FLD: 16.2 % — HIGH (ref 10.3–14.5)
SODIUM SERPL-SCNC: 144 MMOL/L — SIGNIFICANT CHANGE UP (ref 135–145)
WBC # BLD: 8.74 K/UL — SIGNIFICANT CHANGE UP (ref 3.8–10.5)
WBC # FLD AUTO: 8.74 K/UL — SIGNIFICANT CHANGE UP (ref 3.8–10.5)

## 2019-07-18 PROCEDURE — 99233 SBSQ HOSP IP/OBS HIGH 50: CPT

## 2019-07-18 PROCEDURE — 71045 X-RAY EXAM CHEST 1 VIEW: CPT | Mod: 26

## 2019-07-18 RX ORDER — CARBIDOPA AND LEVODOPA 25; 100 MG/1; MG/1
1 TABLET ORAL THREE TIMES A DAY
Refills: 0 | Status: DISCONTINUED | OUTPATIENT
Start: 2019-07-18 | End: 2019-07-31

## 2019-07-18 RX ORDER — SULFASALAZINE 500 MG
500 TABLET ORAL
Refills: 0 | Status: DISCONTINUED | OUTPATIENT
Start: 2019-07-18 | End: 2019-07-26

## 2019-07-18 RX ORDER — TOBRAMYCIN 0.3 %
1 DROPS OPHTHALMIC (EYE)
Refills: 0 | Status: COMPLETED | OUTPATIENT
Start: 2019-07-18 | End: 2019-07-23

## 2019-07-18 RX ORDER — PANTOPRAZOLE SODIUM 20 MG/1
40 TABLET, DELAYED RELEASE ORAL EVERY 12 HOURS
Refills: 0 | Status: DISCONTINUED | OUTPATIENT
Start: 2019-07-18 | End: 2019-07-26

## 2019-07-18 RX ORDER — LATANOPROST 0.05 MG/ML
1 SOLUTION/ DROPS OPHTHALMIC; TOPICAL AT BEDTIME
Refills: 0 | Status: DISCONTINUED | OUTPATIENT
Start: 2019-07-18 | End: 2019-07-31

## 2019-07-18 RX ORDER — LANOLIN ALCOHOL/MO/W.PET/CERES
3 CREAM (GRAM) TOPICAL AT BEDTIME
Refills: 0 | Status: DISCONTINUED | OUTPATIENT
Start: 2019-07-18 | End: 2019-07-31

## 2019-07-18 RX ORDER — TIMOLOL 0.5 %
1 DROPS OPHTHALMIC (EYE)
Refills: 0 | Status: DISCONTINUED | OUTPATIENT
Start: 2019-07-18 | End: 2019-07-31

## 2019-07-18 RX ORDER — ATORVASTATIN CALCIUM 80 MG/1
80 TABLET, FILM COATED ORAL AT BEDTIME
Refills: 0 | Status: DISCONTINUED | OUTPATIENT
Start: 2019-07-18 | End: 2019-07-26

## 2019-07-18 RX ORDER — ACETAMINOPHEN 500 MG
650 TABLET ORAL EVERY 6 HOURS
Refills: 0 | Status: DISCONTINUED | OUTPATIENT
Start: 2019-07-18 | End: 2019-07-31

## 2019-07-18 RX ADMIN — Medication 100 MILLIGRAM(S): at 17:25

## 2019-07-18 RX ADMIN — CARBIDOPA AND LEVODOPA 1 TABLET(S): 25; 100 TABLET ORAL at 15:30

## 2019-07-18 RX ADMIN — CARBIDOPA AND LEVODOPA 1 TABLET(S): 25; 100 TABLET ORAL at 23:43

## 2019-07-18 RX ADMIN — Medication 1 TABLET(S): at 11:38

## 2019-07-18 RX ADMIN — Medication 1 DROP(S): at 05:15

## 2019-07-18 RX ADMIN — LATANOPROST 1 DROP(S): 0.05 SOLUTION/ DROPS OPHTHALMIC; TOPICAL at 23:44

## 2019-07-18 RX ADMIN — Medication 1 DROP(S): at 17:26

## 2019-07-18 RX ADMIN — Medication 1 DROP(S): at 05:14

## 2019-07-18 RX ADMIN — Medication 3 MILLIGRAM(S): at 23:44

## 2019-07-18 RX ADMIN — Medication 12.5 MILLIGRAM(S): at 05:14

## 2019-07-18 RX ADMIN — Medication 1 DROP(S): at 17:27

## 2019-07-18 RX ADMIN — PANTOPRAZOLE SODIUM 40 MILLIGRAM(S): 20 TABLET, DELAYED RELEASE ORAL at 05:14

## 2019-07-18 RX ADMIN — Medication 1 DROP(S): at 23:48

## 2019-07-18 RX ADMIN — SODIUM CHLORIDE 50 MILLILITER(S): 9 INJECTION, SOLUTION INTRAVENOUS at 05:15

## 2019-07-18 RX ADMIN — CARBIDOPA AND LEVODOPA 1 TABLET(S): 25; 100 TABLET ORAL at 05:14

## 2019-07-18 RX ADMIN — ATORVASTATIN CALCIUM 80 MILLIGRAM(S): 80 TABLET, FILM COATED ORAL at 23:44

## 2019-07-18 RX ADMIN — PANTOPRAZOLE SODIUM 40 MILLIGRAM(S): 20 TABLET, DELAYED RELEASE ORAL at 17:27

## 2019-07-18 RX ADMIN — Medication 500 MILLIGRAM(S): at 05:14

## 2019-07-18 RX ADMIN — Medication 100 MILLIGRAM(S): at 05:14

## 2019-07-18 RX ADMIN — Medication 500 MILLIGRAM(S): at 17:26

## 2019-07-18 RX ADMIN — Medication 500 MILLIGRAM(S): at 23:43

## 2019-07-18 NOTE — PROGRESS NOTE ADULT - NSHPATTENDINGPLANDISCUSS_GEN_ALL_CORE
MED STAFF , DR. SOL , DR. SANTIAGO ,[facility] ,OUTPATIENT PCP.
MED STAFF , DR. SOL , DR. SANTIAGO ,ANESTHESIOLOGIST [facility] ON A PHONE , TRANSFER CENTER , ACCEPTING MD  ,OUTPATIENT PCP.
MED STAFF , DR. SOL , DR. SANTIAGO  , [facility] ON A PHONE , TRANSFER CENTER , ACCEPTING MD  ,OUTPATIENT PCP.
4monrod NP

## 2019-07-18 NOTE — PROGRESS NOTE ADULT - ASSESSMENT
84m w/ parkinsons, ulcerative colitis (untreated) a/w dark stools and anemia    also noted troponin elevation, acute on chronic renal failure   troponins now down slightly,   received 2 unit prbc since admission   CT w/ abnml in colon ----? neoplasm   will need EGD and colonoscopy to evaluate further   d/w anesthesia, cardiology and medicine- will arrange for transfer for further care (procedures non urgent as pt stable w/o persistent bleeding)  -clear liquids   CBC daily    Acid suppression   await CEA

## 2019-07-18 NOTE — PROGRESS NOTE ADULT - PROBLEM SELECTOR PLAN 1
Hgb is stable. Patient denies any bowel movements today.   Trend CBC qdaily. Maintain active type and screen, and 2 large bore peripheral IV access.  Consult GI for endoscopy/colonoscopy in AM to identify source of bleed and assess abnormalities noted on CT.   Maintain NPO for now.   Continue protonix 40mg IV BID.

## 2019-07-18 NOTE — PROGRESS NOTE ADULT - ASSESSMENT
The patient is an 84 year old male with a history of HTN, UC, TIAs, Parkinson's who presents with GI bleed, TITA, NSTEMI.    Plan:  - Troponin peaked at 9.247. No need to trend further. Cannot exclude plaque rupture/thrombus formation although more likely there is significant chronic CAD with demand from severe anemia and TITA.  - Echocardiogram with grossly normal LV systolic function  - Hold all antiplatelet and anticoagulants  - Hold lisinopril. Consider alternative therapy due to TITA/CKD.  - Continue atorvastatin 20 mg daily  - Hemoglobin improved after transfusion. Noted to have melena this morning.  - The patient is not a candidate for invasive cardiac work-up due to GI bleed and TITA/CKD. In fact, further work-up with cardiac catheterization will likely lead to deterioration as it will likely involve additional anticoagulants and antiplatelet agents.  - Further GI work-up to evaluate cause of bleed and prevent recurrence would be ideal to reduce the risk of a recurrent ischemic event. The patient is as optimized as possible from a cardiac perspective for endoscopic work-up as the improvement in hemoglobin has led to an improvement in ischemic markers (troponin and ST depressions on ECG) and resolution of cardiac symptoms.  - Plan for transfer to [facility] as anesthesia deemed patient too high risk to have procedure done at [place] The patient is an 84 year old male with a history of HTN, UC, TIAs, Parkinson's who presents with GI bleed, TITA, NSTEMI.    Plan:  - Troponin peaked at 9.247. No need to trend further. Cannot exclude plaque rupture/thrombus formation although more likely there is significant chronic CAD with demand from severe anemia and TITA.  - Echocardiogram with grossly normal LV systolic function  - Hold all antiplatelet and anticoagulants  - Hold lisinopril. Consider alternative therapy due to TITA/CKD.  - Continue atorvastatin 20 mg daily  - Discontinue metoprolol due to bradycardia and intermittent pauses  - Hemoglobin improved after transfusion. Noted to have melena this morning.  - The patient is not a candidate for invasive cardiac work-up due to GI bleed and TITA/CKD. In fact, further work-up with cardiac catheterization will likely lead to deterioration as it will likely involve additional anticoagulants and antiplatelet agents.  - Further GI work-up to evaluate cause of bleed and prevent recurrence would be ideal to reduce the risk of a recurrent ischemic event. The patient is as optimized as possible from a cardiac perspective for endoscopic work-up as the improvement in hemoglobin has led to an improvement in ischemic markers (troponin and ST depressions on ECG) and resolution of cardiac symptoms.  - Plan for transfer to [facility] as anesthesia deemed patient too high risk to have procedure done at [place]

## 2019-07-18 NOTE — PROGRESS NOTE ADULT - SUBJECTIVE AND OBJECTIVE BOX
Patient is a 84y Male whom presented to the hospital with ckd and pb    seen in am  PAST MEDICAL & SURGICAL HISTORY:  UC (ulcerative colitis)  HTN (hypertension)  UC (ulcerative colitis)  HTN (hypertension)  Gout  Chronic kidney disease  Acoustic neuroma  No significant past surgical history  No significant past surgical history      MEDICATIONS  (STANDING):  atorvastatin 20 milliGRAM(s) Oral at bedtime  carbidopa/levodopa  25/100 1 Tablet(s) Oral three times a day  clonazePAM  Tablet 0.5 milliGRAM(s) Oral at bedtime  docusate sodium 100 milliGRAM(s) Oral two times a day  lactobacillus acidophilus 1 Tablet(s) Oral daily  latanoprost 0.005% Ophthalmic Solution 1 Drop(s) Both EYES at bedtime  multivitamin 1 Tablet(s) Oral daily  pantoprazole  Injectable 40 milliGRAM(s) IV Push every 12 hours  sodium chloride 0.9%. 1000 milliLiter(s) (75 mL/Hr) IV Continuous <Continuous>  sulfaSALAzine 500 milliGRAM(s) Oral two times a day  timolol 0.25% Solution 1 Drop(s) Both EYES two times a day      Allergies    No Known Allergies    Intolerances        SOCIAL HISTORY:  Denies ETOh,Smoking,     FAMILY HISTORY:      REVIEW OF SYSTEMS:    CONSTITUTIONAL: No weakness, fevers or chills  RESPIRATORY: No cough, wheezing, hemoptysis; pos  shortness of breath  CARDIOVASCULAR: No chest pain or palpitations  GASTROINTESTINAL: No abdominal or epigastric pain. No nausea, vomiting,     No diarrhea or constipation. pos  melena                                                      9.4    8.74  )-----------( 216      ( 18 Jul 2019 06:31 )             29.4       CBC Full  -  ( 18 Jul 2019 06:31 )  WBC Count : 8.74 K/uL  RBC Count : 2.98 M/uL  Hemoglobin : 9.4 g/dL  Hematocrit : 29.4 %  Platelet Count - Automated : 216 K/uL  Mean Cell Volume : 98.7 fl  Mean Cell Hemoglobin : 31.5 pg  Mean Cell Hemoglobin Concentration : 32.0 gm/dL  Auto Neutrophil # : x  Auto Lymphocyte # : x  Auto Monocyte # : x  Auto Eosinophil # : x  Auto Basophil # : x  Auto Neutrophil % : x  Auto Lymphocyte % : x  Auto Monocyte % : x  Auto Eosinophil % : x  Auto Basophil % : x      07-18    144  |  110<H>  |  23  ----------------------------<  126<H>  4.2   |  23  |  1.76<H>    Ca    8.6      18 Jul 2019 06:31  Phos  4.0     07-18    TPro  5.9<L>  /  Alb  3.0<L>  /  TBili  0.6  /  DBili  x   /  AST  22  /  ALT  10  /  AlkPhos  151<H>  07-18      CAPILLARY BLOOD GLUCOSE          Vital Signs Last 24 Hrs  T(C): 36.8 (18 Jul 2019 21:07), Max: 37.1 (17 Jul 2019 23:59)  T(F): 98.3 (18 Jul 2019 21:07), Max: 98.8 (17 Jul 2019 23:59)  HR: 66 (18 Jul 2019 21:07) (54 - 72)  BP: 158/70 (18 Jul 2019 21:07) (100/84 - 158/70)  BP(mean): 92 (18 Jul 2019 16:00) (66 - 92)  RR: 18 (18 Jul 2019 21:07) (11 - 26)  SpO2: 98% (18 Jul 2019 21:07) (93% - 99%)        PT/INR - ( 18 Jul 2019 06:31 )   PT: 13.2 sec;   INR: 1.20 ratio                     PHYSICAL EXAM:    Constitutional: NAD  HEENT: conjunctive   clear   Neck:  No JVD  Respiratory: decrease bs b/l   Cardiovascular: S1 and S2  Gastrointestinal: BS+, soft, NT/ND  Extremities: trace  peripheral edema  Neurological:  no focal deficits  Psychiatric: normal affect  Skin: dry  Access: Not applicable

## 2019-07-18 NOTE — PROGRESS NOTE ADULT - ASSESSMENT
[facility] MEDICINE ADMISSION NOTE:    HPI: This patient is an 84yoM with PMH of acoustic neuroma, hearing difficulty, Parkinson's disease, CKD stage 3, gout, htn, ulcerative colitis who was initally transferred brought from [facility] to [facility] for SOB and black stool, and found to be anemic to Hgb of 6.7/Hct 20.9, elevated MCV of 103. TSH, B12 and folate WNL. Low total iron of 21, with TIBC 306. He was transfused 2 u PRBC.     CT abdomen was abnormal and found a fusiform ectasia of the abdominal aorta above the level of the aortic bifurcation, measuring 2.8cm- monitoring advised to evaluate for developing aortic aneurysm. Patient also found to have a Trace bilateral perinephric stranding, left inguinal hernia containing nonobstructed large bowel. Patient noted on right side of large bowel to have 2 areas of potential narrowing-lesions vs areas of spasm- which may represent colonic neoplasm.Patient will require EGD/colonoscopy to evalaute further. Patient was kept on clear liquids, protonix, daily CBC monitoring.     The patient was also found to have elevation of troponin, peaking at 9.247, then downtrending. EKG on admission was notable for sinus HR 88, ST depressions in leads V2-C4  ProBNP was elevated to 6715. Pt was admitted to telemetry due to concern for NSTEMI.   TTE found grossly normal LV fucntion, EF approx 55%, with possible inferoseptal wall, inferior lateral wall hypokinesis. Stage I diastolic dysfunction noted. Antiplatelet and anticoagulation were held.Lisinopril was held due ti TITA. Metoprolol was discontinued due to bradycardia and intermittent pauses. Patient was deemed not a candidate for invasive cardiac  workup due to GIB and TITA/CKD. The patient was deemed by cardiologist Francis Mccrary (7/18/2019) to be as optimized as possible from a cardiac perspective for endoscopic workup since improvement in hemoglobin led to improvement of ischemic markers and resolution of cardiac symptoms. Further GI workup planned to reduce risk of recurrent ischemic event.     Nephrology was consulted for TITA on chronic kidney disease. Scr was elevated to 2.7 on admission, with BUN of 71. Lisinopril was held. IVF were provided.     Patient has ulcerative colitis and was started on PPI and continued sulfasalazine.    AST elevated to 50.   Upon arrival, T 98.5F, HR 65, /75, RR 18, SpO2 99%RA This patient is an 84yoM with PMH of acoustic neuroma, hearing difficulty, Parkinson's disease, CKD stage 3, gout, htn, ulcerative colitis who was initally transferred brought from [facility] to [facility] for SOB and black stool, and found to be anemic, suspected GIB, with NSTEMI.

## 2019-07-18 NOTE — PROGRESS NOTE ADULT - SUBJECTIVE AND OBJECTIVE BOX
Patient was examined Chart reviewed Detailed note will be inserted below after going over latest data Meanwhile please refer to my previous notes for Pulmonary/Critical Care assessment recommendations ARVIND COLLADO [facility] S [phone]   1935 DOA 7/15/2019 DR VICKEY MACK   ALLERGY      nka   CONTACT         son Abdifatah MOLINA [phone] selv [phone] carleen MOLINA [phone]    Initial evaluation/Pulmonary Critical Care consultation requested on 2019   by Dr Mack  from Dr Mao   Patient examined chart reviewed    HOSPITAL ADMISSION   PATIENT CAME  FROM (if information available)        TYPE OF VISIT      Subsequent Pulmonary followup     REASON FOR VISIT  PLEASE SEE PROBLEM LIST/ASSESSMENTAND RECOMMENDATIONS     PATIENT ARVIND COLLADO [facility] S [phone]   1935 DOA 7/15/2019 DR VICKEY MACK       VITALS/LABS       2019 afeb 55 130/70 17 98%   2019 W 8.7 Hb 9.4 Plt 216 INR 1.2 Na 144 K 4.2 CO2 23 Cr 1.7     PATIENT ARVIND COLLADO [facility] S [phone]   1935 DOA 7/15/2019 DR VICKEY MACK         MEDICATIONS      IV   D5 1/2 NS 50 (7/15)   CAD  atorvastat 20 (7/15)   Metoprolol 12.5x2 ()  PARKINSONS   sinemet 25 1000x3 (7/15)   ANXIETY   Clonazepam 0.5   ULCERATIVE COLITIS   Sulfadiazine 500.2 (7/15)   GI BL   protonix 40.2 (7/15)     GLOBAL ISSUE/BEST PRACTICE:      PROBLEM: HOB elevation:   y            PROBLEM: Stress ulcer proph:    protonix 40.2 (7/15)                       PROBLEM: VTE prophylaxis:     No pharmac pplx bec of gi bl (7/15)   PROBLEM: Glycemic control:    na  PROBLEM: Nutrition:   clear liq (7/15) npo after mn ()   PROBLEM: Advanced directive: na     PROBLEM: Allergies:  na    REVIEW OF SYMPTOMS     NOTE Changess if any  in ROS and PE are updated in daily HOSPITAL COURSE below      Able to give ROS  Yes     RELIABLE No   CONSTITUTIONAL Weakness Yes  Chills No Vision changes No  ENDOCRINE No unexplained hair loss No heat or cold intolerance    ALLERGY No hives  Sore throat No   RESP Coughing blood no  Shortness of breath YES   NEURO No Headache  Confusion Pain neck No   CARDIAC No Chest pain No Palpitations   GI No Pain abdomen NO   Vomiting NO     PHYSICAL EXAM    HEENT Unremarkable PERRLA atraumatic   RESP Fair air entry EXP prolonged    Harsh breath sound Resp distres mild   CARDIAC S1 S2 No S3     NO JVD    ABDOMEN SOFT BS PRESENT NOT DISTENDED No hepatosplenomegaly PEDAL EDEMA present No calf tenderness  NO rash   GENERAL Not TOXIC

## 2019-07-18 NOTE — PROGRESS NOTE ADULT - PROBLEM SELECTOR PLAN 7
VTE ppx: venodyne boots  Activity: bedrest for now  Diet: NPO for now Hold metoprolol due to pauses noted at [facility]  BP currently mildly elevated. Will monitor for now.

## 2019-07-18 NOTE — PROGRESS NOTE ADULT - PROBLEM SELECTOR PLAN 1
CHRONIC KIDNEY DISEASE, STAGE 3: increase water intake , continue with iv fluid   Serum creatinine is stable at improved 1.76, approximating a GFR of is decreased  ml/min.   There is no progression.  No uremic symptoms. No evidence of  worsening  Anemia. Fluid status stable.   Will continue to avoid nephrotoxic drugs.  Patient remains asymptomatic.  Continue current therapy.

## 2019-07-18 NOTE — PROGRESS NOTE ADULT - PROBLEM SELECTOR PLAN 1
SERIAL CBC , PII BID , IV FLUIDS , GI CONSULT , HOLD AGGRENOX ,MONITOR H/H CLOSELY , S/p  2 U OF PRBCS ,EGD planned by GI consult TRANSFER TO [facility] IS RECOMMENDED BY ANESTHESIOLOGIST , D/W CARD AND GI CONS ,ALL PHYSICIANS ARE IN AGREEMENT

## 2019-07-18 NOTE — PROGRESS NOTE ADULT - SUBJECTIVE AND OBJECTIVE BOX
Chief Complaint: GI bleed, shortness of breath, chest pain    Interval Events: As per nurse, had melena this morning.    Review of Systems:  General: No fevers, chills, weight loss or gain  Skin: No rashes, color changes  Cardiovascular: No chest pain, orthopnea  Respiratory: No shortness of breath, cough  Gastrointestinal: No nausea, abdominal pain  Genitourinary: No incontinence, pain with urination  Musculoskeletal: No pain, swelling, decreased range of motion  Neurological: No headache, weakness  Psychiatric: No depression, anxiety  Endocrine: No weight loss or gain, increased thirst  All other systems are comprehensively negative.    Physical Exam:  Vital Signs Last 24 Hrs  T(C): 36.5 (18 Jul 2019 07:35), Max: 37.1 (17 Jul 2019 23:59)  T(F): 97.7 (18 Jul 2019 07:35), Max: 98.8 (17 Jul 2019 23:59)  HR: 56 (18 Jul 2019 08:00) (54 - 78)  BP: 116/56 (18 Jul 2019 08:00) (100/84 - 161/83)  BP(mean): 74 (18 Jul 2019 08:00) (66 - 115)  RR: 11 (18 Jul 2019 08:00) (11 - 33)  SpO2: 97% (18 Jul 2019 08:00) (93% - 100%)  General: NAD  HEENT: MMM  Neck: No JVD, no carotid bruit  Lungs: CTAB  CV: RRR, nl S1/S2, no M/R/G  Abdomen: S/NT/ND, +BS  Extremities: No LE edema, no cyanosis  Neuro: AAOx3, non-focal  Skin: No rash    Labs:             07-18    144  |  110<H>  |  23  ----------------------------<  126<H>  4.2   |  23  |  1.76<H>    Ca    8.6      18 Jul 2019 06:31  Phos  4.0     07-18    TPro  5.9<L>  /  Alb  3.0<L>  /  TBili  0.6  /  DBili  x   /  AST  22  /  ALT  10  /  AlkPhos  151<H>  07-18                        9.4    8.74  )-----------( 216      ( 18 Jul 2019 06:31 )             29.4       Telemetry: Sinus rhythm, bradycardic at times Chief Complaint: GI bleed, shortness of breath, chest pain    Interval Events: As per nurse, had melena this morning.    Review of Systems:  General: No fevers, chills, weight loss or gain  Skin: No rashes, color changes  Cardiovascular: No chest pain, orthopnea  Respiratory: No shortness of breath, cough  Gastrointestinal: No nausea, abdominal pain  Genitourinary: No incontinence, pain with urination  Musculoskeletal: No pain, swelling, decreased range of motion  Neurological: No headache, weakness  Psychiatric: No depression, anxiety  Endocrine: No weight loss or gain, increased thirst  All other systems are comprehensively negative.    Physical Exam:  Vital Signs Last 24 Hrs  T(C): 36.5 (18 Jul 2019 07:35), Max: 37.1 (17 Jul 2019 23:59)  T(F): 97.7 (18 Jul 2019 07:35), Max: 98.8 (17 Jul 2019 23:59)  HR: 56 (18 Jul 2019 08:00) (54 - 78)  BP: 116/56 (18 Jul 2019 08:00) (100/84 - 161/83)  BP(mean): 74 (18 Jul 2019 08:00) (66 - 115)  RR: 11 (18 Jul 2019 08:00) (11 - 33)  SpO2: 97% (18 Jul 2019 08:00) (93% - 100%)  General: NAD  HEENT: MMM  Neck: No JVD, no carotid bruit  Lungs: CTAB  CV: RRR, nl S1/S2, no M/R/G  Abdomen: S/NT/ND, +BS  Extremities: No LE edema, no cyanosis  Neuro: AAOx3, non-focal  Skin: No rash    Labs:             07-18    144  |  110<H>  |  23  ----------------------------<  126<H>  4.2   |  23  |  1.76<H>    Ca    8.6      18 Jul 2019 06:31  Phos  4.0     07-18    TPro  5.9<L>  /  Alb  3.0<L>  /  TBili  0.6  /  DBili  x   /  AST  22  /  ALT  10  /  AlkPhos  151<H>  07-18                        9.4    8.74  )-----------( 216      ( 18 Jul 2019 06:31 )             29.4       Telemetry: Sinus rhythm, bradycardic at times, pause of 3 sec

## 2019-07-18 NOTE — PROGRESS NOTE ADULT - SUBJECTIVE AND OBJECTIVE BOX
PROGRESS NOTE  Patient is a 84y old  Male who presents with a chief complaint of SOB , anemia ,black stools (17 Jul 2019 18:28)  Chart and available morning labs /imaging are reviewed electronically , urgent issues addressed . More information  is being added upon completion of rounds , when more information is collected and management discussed with consultants , medical staff and social service/case management on the floor     OVERNIGHT  No new issues reported by medical staff . All above noted Patient is resting in a bed comfortably . .No distress noted   Had 2 BM since yesterday but not black ,dark brown in color Denies abd pain ,looks comfortable  HPI:  85 yo male Acoustic neuroma ,[place] , ,Parkinson Dz, unsteady gait ,  Chronic kidney disease  ,Gout  ,HTN (hypertension)   ,UC (ulcerative colitis) , resident of [facility] brought for eval of SOB and black stools for several days. Found to have anemia and admitted for hemotransfusion and GI workup .Patient had colonoscopy 3 years ago and [place] and had some polyps removed ,he denies any recent hx of GIB or GI workup ,but admits black stools and lightheadedness ,sob ,worsening last few days .Found to have RANJANA elevation and seen by cardiologist Admitted  to telemetry unit for monitoring , send 3 sets of cardiac ensymes to rule out coronary event, obtain ECHO to evaluate LVEF, cardiology consult ,continue current management, O2 supply, anticoagulation plan as per cardiology  ,aggrenox placed on hold until GI clearance will be obtained Nephrology cons called ,IV hydration is administrated ,lisinopril held as per cardiologist recommendation . (15 Jul 2019 08:57)    PAST MEDICAL & SURGICAL HISTORY:  UC (ulcerative colitis)  HTN (hypertension)  UC (ulcerative colitis)  HTN (hypertension)  Gout  Chronic kidney disease  Acoustic neuroma  No significant past surgical history  No significant past surgical history      MEDICATIONS  (STANDING):  artificial  tears Solution 1 Drop(s) Both EYES two times a day  atorvastatin 20 milliGRAM(s) Oral at bedtime  carbidopa/levodopa  25/100 1 Tablet(s) Oral three times a day  clonazePAM  Tablet 0.5 milliGRAM(s) Oral at bedtime  dextrose 5% + sodium chloride 0.45%. 1000 milliLiter(s) (50 mL/Hr) IV Continuous <Continuous>  docusate sodium 100 milliGRAM(s) Oral two times a day  lactobacillus acidophilus 1 Tablet(s) Oral daily  latanoprost 0.005% Ophthalmic Solution 1 Drop(s) Both EYES at bedtime  metoprolol tartrate 12.5 milliGRAM(s) Oral every 12 hours  multivitamin 1 Tablet(s) Oral daily  pantoprazole  Injectable 40 milliGRAM(s) IV Push every 12 hours  sulfaSALAzine 500 milliGRAM(s) Oral two times a day  timolol 0.25% Solution 1 Drop(s) Both EYES two times a day  tobramycin 0.3% Ophthalmic Solution 1 Drop(s) Right EYE two times a day    MEDICATIONS  (PRN):  acetaminophen   Tablet .. 650 milliGRAM(s) Oral every 6 hours PRN Temp greater or equal to 38C (100.4F), Mild Pain (1 - 3)  melatonin 3 milliGRAM(s) Oral at bedtime PRN Insomnia      OBJECTIVE    T(C): 36.5 (07-18-19 @ 07:35), Max: 37.1 (07-17-19 @ 23:59)  HR: 56 (07-18-19 @ 08:00) (54 - 78)  BP: 116/56 (07-18-19 @ 08:00) (100/84 - 161/83)  RR: 11 (07-18-19 @ 08:00) (11 - 33)  SpO2: 97% (07-18-19 @ 08:00) (93% - 100%)  Wt(kg): --  I&O's Summary    17 Jul 2019 07:01  -  18 Jul 2019 07:00  --------------------------------------------------------  IN: 1440 mL / OUT: 1025 mL / NET: 415 mL          REVIEW OF SYSTEMS:  ROS is unobtainable due to dementia and confusion   PHYSICAL EXAM:  Appearance: NAD. VS past 24 hrs -as above   HEENT:   Moist oral mucosa. Conjunctiva clear b/l. R eye blepharitis improved   Neck : supple  Respiratory: Lungs CTAB.  Gastrointestinal:  Soft, nontender. No rebound. No rigidity. BS present	  Cardiovascular: RRR ,S1S2 present  Neurologic: Non-focal. Moving all extremities.  Extremities: No edema. No erythema. No calf tenderness.  Skin: No rashes, No ecchymoses, No cyanosis.	  wounds ,skin lesions-See skin assesment flow sheet   LABS:                        9.4    8.74  )-----------( 216      ( 18 Jul 2019 06:31 )             29.4     07-18    144  |  110<H>  |  23  ----------------------------<  126<H>  4.2   |  23  |  1.76<H>    Ca    8.6      18 Jul 2019 06:31  Phos  4.0     07-18    TPro  5.9<L>  /  Alb  3.0<L>  /  TBili  0.6  /  DBili  x   /  AST  22  /  ALT  10  /  AlkPhos  151<H>  07-18    CAPILLARY BLOOD GLUCOSE        PT/INR - ( 18 Jul 2019 06:31 )   PT: 13.2 sec;   INR: 1.20 ratio               RADIOLOGY & ADDITIONAL TESTS:< from: CT Abdomen and Pelvis w/ Oral Cont (07.16.19 @ 13:48) >  EXAM:  CT ABDOMEN AND PELVIS OC                                  PROCEDURE DATE:  07/16/2019          INTERPRETATION:  Clinical information: Anemia, history of colitis.    Axial images obtained, coronal and sagittal images computer reformatted.   Oral contrast material administered. IV contrast material not   administered limiting evaluation.    No prior studies present for comparison    Minimal right pleural effusion with the septal thickening at the right   lung base. Minimal scarring or atelectasis at the left lung base. The   left hemidiaphragm is elevated, nonspecific.    Coronary artery calcifications noted.    Hepatic parenchyma homogeneous. The spleen is not enlarged. No adrenal   lesions evident. Unenhanced pancreas unremarkable. The gallbladder is   filled.  No ascites. No retroperitoneal lymphadenopathy. No biliary   ductal dilatation.    Fusiform, ectasia of the abdominal aorta just above the level of the   aortic bifurcation measuring 2.8 cm. Advise monitoring to evaluate for   developing aortic aneurysm.    No evidence of right hydronephrosis. A right renal cyst is present medial   aspect upper pole.     Left kidney demonstrates a cyst, lateral mid polar region. No left   hydronephrosis. Trace bilateral perinephric stranding.    No evidence of bowel obstruction. No appendicitis. A left inguinal hernia   is present containing nonobstructed large bowel. Additionally, on the   right side of the large bowel there are 2 areas of the potential   narrowing-lesions versus areas of spasm. Advise colonoscopy evaluation to   exclude occult colonic neoplasms.    The rectum is distended with stool. Urinary bladder contains small   quantity of urine, no stones are evident. The prostate is markedly   enlarged. There is no evidence of free pelvic fluid. Fat-containing right   inguinal hernia present. No acute osseous abnormalities. Multilevel disc   degenerative disease lower lumbar region. Grade 1 anterolisthesis L4 over   L5.    IMPRESSION:  1. No evidence of hydronephrosis or bowel obstruction  2. Left inguinal hernia containing nonobstructed large bowel  3. There are 2 areas of potential narrowing-lesion versus spasm on the   right side of the large bowel. Advise colonoscopic evaluation.    < end of copied text >     reviewed elctronically   Patient was seen and examined on a day of discharge . Plan of care , discharge medications and recommendations discussed with consultants and clearance for discharge obtained .Social service , case management  and medical staff are aware of plan. Family is notified. Discharge summary  is  prepared electronically OOBTC/PT .Advance care planning was d/w the family.Pain is accessed and addresed.Pt was screened for signs of clinical depression .Appropriate referral made.Risk of falls accessed.Fall prevention d/w family .Pt is screened for tobacco and etoh,counseling was done for etoh and tobacco cessation.Use of narcotic pain meds was discussed.Pt is advised to use narcotic meds  wisely and to avoid overusing them.Plan of care d/w family and all questions answered to best of my knowledge  75 minutes spent on discharge  visit, 50% visit time spent in care co-ordination with other attendings and counselling patient  I have discussed care plan with patient and HCP,expressed understanding of problems treatment and their effect and side effects, alternatives in detail,I have asked if they have any questions and concerns and appropriately addressed them to best of my ability Patient was seen and examined on a day of discharge . Plan of care , discharge medications and recommendations discussed with consultants and clearance for discharge obtained .Social service , case management  and medical staff are aware of plan. Family is notified. Discharge summary  is  prepared electronically -see separate attached document

## 2019-07-18 NOTE — PROGRESS NOTE ADULT - SUBJECTIVE AND OBJECTIVE BOX
[facility] MEDICINE ADMISSION NOTE:    HPI: This patient is an 84yoM with PMH of acoustic neuroma, hearing difficulty, Parkinson's disease, CKD stage 3, gout, htn, ulcerative colitis who was initally transferred brought from [facility] to [facility] for SOB and black stool, and found to be anemic to Hgb of 6.7/Hct 20.9, elevated MCV of 103. TSH, B12 and folate WNL. Low total iron of 21, with TIBC 306. He was transfused 2 u PRBC.     CT abdomen was abnormal and found a fusiform ectasia of the abdominal aorta above the level of the aortic bifurcation, measuring 2.8cm- monitoring advised to evaluate for developing aortic aneurysm. Patient also found to have a Trace bilateral perinephric stranding, left inguinal hernia containing nonobstructed large bowel. Patient noted on right side of large bowel to have 2 areas of potential narrowing-lesions vs areas of spasm- which may represent colonic neoplasm.Patient will require EGD/colonoscopy to evalaute further. Patient was kept on clear liquids, protonix, daily CBC monitoring.     The patient was also found to have elevation of troponin, peaking at 9.247, then downtrending. EKG on admission was notable for sinus HR 88, ST depressions in leads V2-C4  ProBNP was elevated to 6715. Pt was admitted to telemetry due to concern for NSTEMI.   TTE found grossly normal LV fucntion, EF approx 55%, with possible inferoseptal wall, inferior lateral wall hypokinesis. Stage I diastolic dysfunction noted. Antiplatelet and anticoagulation were held.Lisinopril was held due ti TITA. Metoprolol was discontinued due to bradycardia and intermittent pauses. Patient was deemed not a candidate for invasive cardiac  workup due to GIB and TITA/CKD. The patient was deemed by cardiologist Francis Mccrary (7/18/2019) to be as optimized as possible from a cardiac perspective for endoscopic workup since improvement in hemoglobin led to improvement of ischemic markers and resolution of cardiac symptoms. Further GI workup planned to reduce risk of recurrent ischemic event.     Nephrology was consulted for TITA on chronic kidney disease. Scr was elevated to 2.7 on admission, with BUN of 71. Lisinopril was held. IVF were provided.     Patient has ulcerative colitis and was started on PPI and continued sulfasalazine.    AST elevated to 50.   Upon arrival, T 98.5F, HR 65, /75, RR 18, SpO2 99%RA    REVIEW OF SYSTEMS:   CONSTITUTIONAL: No fever, no chills, no fatigue  EYES: No eye pain, no vision changes  ENMT:  No difficulty hearing, no throat pain  RESPIRATORY: No cough, no wheezing, no sputum production; No shortness of breath  CARDIOVASCULAR: No chest pain, no palpitations, no JOSHI, no leg swelling  GASTROINTESTINAL: No abdominal pain, no nausea, no vomiting, no hematemesis, no diarrhea, no constipation, no melena, no hematochezia.  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, 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[place]    HOME MEDICATIONS:     ALLERGIES: NKDA    PHYSICAL EXAM:   T(C): 36.9 (07-18-19 @ 19:06), Max: 37.1 (07-17-19 @ 23:59)  HR: 65 (07-18-19 @ 19:06) (54 - 72)  BP: 145/75 (07-18-19 @ 19:06) (100/84 - 145/75)  RR: 18 (07-18-19 @ 19:06) (11 - 26)  SpO2: 99% (07-18-19 @ 19:06) (93% - 99%)  Wt(kg): --Vital Signs Last 24 Hrs  T(C): 36.9 (18 Jul 2019 19:06), Max: 37.1 (17 Jul 2019 23:59)  T(F): 98.5 (18 Jul 2019 19:06), Max: 98.8 (17 Jul 2019 23:59)  HR: 65 (18 Jul 2019 19:06) (54 - 72)  BP: 145/75 (18 Jul 2019 19:06) (100/84 - 145/75)  BP(mean): 92 (18 Jul 2019 16:00) (66 - 92)  RR: 18 (18 Jul 2019 19:06) (11 - 26)  SpO2: 99% (18 Jul 2019 19:06) (93% - 99%)    GENERAL: NAD, well-groomed, well-developed  HEAD:  Atraumatic, Normocephalic  EYES: EOMI, PERRLA, conjunctiva and sclera clear  ENMT: No oropharyngeal exudates, erythema or lesions,  Moist mucous membranes, good dentition  NECK: Supple, no cervical lymphadenopathy, no JVD  NERVOUS SYSTEM:  Alert & Oriented X3, CN II-XII intact, 5/5 BUE and BLE motor strength, full sensation to light touch   CHEST/LUNG: Clear to auscultation bilaterally; No rales, no  rhonchi, no wheezing  HEART: Regular rate and rhythm; No murmurs, rubs, or gallops  ABDOMEN: Soft, Nontender, Nondistended  EXTREMITIES:  2+ Peripheral Pulses, No clubbing, cyanosis, or edema  LYMPH: No lymphadenopathy noted  SKIN: No rashes or lesions    Labs, imaging and EKG personally reviewed by me.     LABS:  Hgb stable from yesterday at 9.4.   INR slightly elevated at 1.2.    CMP found elevated serum chloride of 110.  SCr is downtrending from 2.7 on admission to 1.76 now.  Total serum protein is low at 5.9.   Alk phos is elevated to 151.                         9.4    8.74  )-----------( 216      ( 18 Jul 2019 06:31 )             29.4     Hgb Trend: 9.4<--, 9.2<--, 9.7<--, 8.6<--, 8.0<--  07-18    144  |  110<H>  |  23  ----------------------------<  126<H>  4.2   |  23  |  1.76<H>    Ca    8.6      18 Jul 2019 06:31  Phos  4.0     07-18    TPro  5.9<L>  /  Alb  3.0<L>  /  TBili  0.6  /  DBili  x   /  AST  22  /  ALT  10  /  AlkPhos  151<H>  07-18    Creatinine Trend: 1.76<--, 1.90<--, 2.31<--, 2.70<--  PT/INR - ( 18 Jul 2019 06:31 )   PT: 13.2 sec;   INR: 1.20 ratio       Full imaging reports on [place]  US Transthoracic Echocardiogram w/Doppler Complete (07.15.19 @ 10:29) >  SUMMARY:  1. technical difficult study, limited study  2. grossly overall left ventricular systolic function, possible   inferoseptal wall, inferior lateral wall hypokinesis. Grade one diastolic  dysfunction  3. mild mitral regurgitation    < end of copied text >    < from: CT Abdomen and Pelvis w/ Oral Cont (07.16.19 @ 13:48) >  IMPRESSION:  1. No evidence of hydronephrosis or bowel obstruction  2. Left inguinal hernia containing nonobstructed large bowel  3. There are 2 areas of potential narrowing-lesion versus spasm on the   right side of the large bowel. Advise colonoscopic evaluation.    < end of copied text > [facility] MEDICINE ADMISSION NOTE:    HPI: This patient is an 84yoM with PMH of acoustic neuroma, hearing difficulty, Parkinson's disease, CKD stage 3, gout, htn, ulcerative colitis who was initally transferred brought from [facility] to [facility] for SOB, chest discomfort 3 days and black stool, and found to be anemic to Hgb of 6.7/Hct 20.9, elevated MCV of 103. TSH, B12 and folate WNL. Low total iron of 21, with TIBC 306. He was transfused 2 u PRBC.     CT abdomen was abnormal and found a fusiform ectasia of the abdominal aorta above the level of the aortic bifurcation, measuring 2.8cm- monitoring advised to evaluate for developing aortic aneurysm. Patient also found to have a Trace bilateral perinephric stranding, left inguinal hernia containing nonobstructed large bowel. Patient noted on right side of large bowel to have 2 areas of potential narrowing-lesions vs areas of spasm- which may represent colonic neoplasm.Patient will require EGD/colonoscopy to evalaute further. Patient was kept on clear liquids, protonix, daily CBC monitoring.     The patient was also found to have elevation of troponin, peaking at 9.247, then downtrending. EKG on admission was notable for sinus HR 88, ST depressions in leads V2-C4  ProBNP was elevated to 6715. Pt was admitted to telemetry due to concern for NSTEMI.   TTE found grossly normal LV fucntion, EF approx 55%, with possible inferoseptal wall, inferior lateral wall hypokinesis. Stage I diastolic dysfunction noted. Antiplatelet and anticoagulation were held.Lisinopril was held due ti TITA. Metoprolol was discontinued due to bradycardia and intermittent pauses. Patient was deemed not a candidate for invasive cardiac  workup due to GIB and TITA/CKD. The patient was deemed by cardiologist Francis Mccrary (7/18/2019) to be as optimized as possible from a cardiac perspective for endoscopic workup since improvement in hemoglobin led to improvement of ischemic markers and resolution of cardiac symptoms. Further GI workup planned to reduce risk of recurrent ischemic event.     Nephrology was consulted for TITA on chronic kidney disease. Scr was elevated to 2.7 on admission, with BUN of 71. Lisinopril was held. IVF were provided.     Patient has ulcerative colitis and was started on PPI and continued sulfasalazine.    AST elevated to 50.   Upon arrival, T 98.5F, HR 65, /75, RR 18, SpO2 99%RA    REVIEW OF SYSTEMS:   CONSTITUTIONAL: No fever, no chills, no fatigue  EYES: No eye pain, no vision changes  ENMT:  No difficulty hearing, no throat pain  RESPIRATORY: No cough, no wheezing, no sputum production; No shortness of breath  CARDIOVASCULAR: No chest pain, no palpitations, no JOSHI, no leg swelling  GASTROINTESTINAL: No abdominal pain, no nausea, no vomiting, no hematemesis, no diarrhea, no constipation, no melena, no hematochezia.  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, 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[place]    HOME MEDICATIONS: viagra 100mg PO qd, vitamin 1 tab PO qd, latanoprost 0.005% drop one drop in both eyes daily qhs, timolol 0.25% opth drop 1 drop in both eyes BID, acidophilus 1 capsule PO qd, aggrenox 25mg/200mg capsule 1 capsule PO BID, atorvastatin 20mg PO qd, carbidopa-levodopa 25/100mg 1 tablet PO TID, colace 100mg 1 tabPO BID, klonopin 0.5mg 2 tab PO qhs, linisopril 40mg PO 1 tab qd, sulfasalazine 500mg 2 tabs BID      ALLERGIES: NKDA    PHYSICAL EXAM:   T(C): 36.9 (07-18-19 @ 19:06), Max: 37.1 (07-17-19 @ 23:59)  HR: 65 (07-18-19 @ 19:06) (54 - 72)  BP: 145/75 (07-18-19 @ 19:06) (100/84 - 145/75)  RR: 18 (07-18-19 @ 19:06) (11 - 26)  SpO2: 99% (07-18-19 @ 19:06) (93% - 99%)  Wt(kg): --Vital Signs Last 24 Hrs  T(C): 36.9 (18 Jul 2019 19:06), Max: 37.1 (17 Jul 2019 23:59)  T(F): 98.5 (18 Jul 2019 19:06), Max: 98.8 (17 Jul 2019 23:59)  HR: 65 (18 Jul 2019 19:06) (54 - 72)  BP: 145/75 (18 Jul 2019 19:06) (100/84 - 145/75)  BP(mean): 92 (18 Jul 2019 16:00) (66 - 92)  RR: 18 (18 Jul 2019 19:06) (11 - 26)  SpO2: 99% (18 Jul 2019 19:06) (93% - 99%)    GENERAL: NAD, well-groomed, well-developed  HEAD:  Atraumatic, Normocephalic  EYES: EOMI, PERRLA, conjunctiva and sclera clear  ENMT: No oropharyngeal exudates, erythema or lesions,  Moist mucous membranes, good dentition  NECK: Supple, no cervical lymphadenopathy, no JVD  NERVOUS SYSTEM:  Alert & Oriented X3, CN II-XII intact, 5/5 BUE and BLE motor strength, full sensation to light touch   CHEST/LUNG: Clear to auscultation bilaterally; No rales, no  rhonchi, no wheezing  HEART: Regular rate and rhythm; No murmurs, rubs, or gallops  ABDOMEN: Soft, Nontender, Nondistended  EXTREMITIES:  2+ Peripheral Pulses, No clubbing, cyanosis, or edema  LYMPH: No lymphadenopathy noted  SKIN: No rashes or lesions    Labs, imaging and EKG personally reviewed by me.     LABS:  Hgb stable from yesterday at 9.4.   INR slightly elevated at 1.2.    CMP found elevated serum chloride of 110.  SCr is downtrending from 2.7 on admission to 1.76 now.  Total serum protein is low at 5.9.   Alk phos is elevated to 151.                         9.4    8.74  )-----------( 216      ( 18 Jul 2019 06:31 )             29.4     Hgb Trend: 9.4<--, 9.2<--, 9.7<--, 8.6<--, 8.0<--  07-18    144  |  110<H>  |  23  ----------------------------<  126<H>  4.2   |  23  |  1.76<H>    Ca    8.6      18 Jul 2019 06:31  Phos  4.0     07-18    TPro  5.9<L>  /  Alb  3.0<L>  /  TBili  0.6  /  DBili  x   /  AST  22  /  ALT  10  /  AlkPhos  151<H>  07-18    Creatinine Trend: 1.76<--, 1.90<--, 2.31<--, 2.70<--  PT/INR - ( 18 Jul 2019 06:31 )   PT: 13.2 sec;   INR: 1.20 ratio       Full imaging reports on [place]  US Transthoracic Echocardiogram w/Doppler Complete (07.15.19 @ 10:29) >  SUMMARY:  1. technical difficult study, limited study  2. grossly overall left ventricular systolic function, possible   inferoseptal wall, inferior lateral wall hypokinesis. Grade one diastolic  dysfunction  3. mild mitral regurgitation    < end of copied text >    < from: CT Abdomen and Pelvis w/ Oral Cont (07.16.19 @ 13:48) >  IMPRESSION:  1. No evidence of hydronephrosis or bowel obstruction  2. Left inguinal hernia containing nonobstructed large bowel  3. There are 2 areas of potential narrowing-lesion versus spasm on the   right side of the large bowel. Advise colonoscopic evaluation.    < end of copied text > [facility] MEDICINE ADMISSION NOTE:    HPI: This patient is an 84yoM with PMH of acoustic neuroma, hearing difficulty, Parkinson's disease, CKD stage 3, gout, htn, ulcerative colitis who was initally transferred brought from [facility] to [facility] for SOB, chest discomfort 3 days and black stool, and found to be anemic to Hgb of 6.7/Hct 20.9, elevated MCV of 103. TSH, B12 and folate WNL. Low total iron of 21, with TIBC 306. He was transfused 2 u PRBC.     CT abdomen was abnormal and found a fusiform ectasia of the abdominal aorta above the level of the aortic bifurcation, measuring 2.8cm- monitoring advised to evaluate for developing aortic aneurysm. Patient also found to have a Trace bilateral perinephric stranding, left inguinal hernia containing nonobstructed large bowel. Patient noted on right side of large bowel to have 2 areas of potential narrowing-lesions vs areas of spasm- which may represent colonic neoplasm.Patient will require EGD/colonoscopy to evalaute further. Patient was kept on clear liquids, protonix, daily CBC monitoring.     The patient was also found to have elevation of troponin, peaking at 9.247, then downtrending. EKG on admission was notable for sinus HR 88, ST depressions in leads V2-C4  ProBNP was elevated to 6715. Pt was admitted to telemetry due to concern for NSTEMI.   TTE found grossly normal LV fucntion, EF approx 55%, with possible inferoseptal wall, inferior lateral wall hypokinesis. Stage I diastolic dysfunction noted. Antiplatelet and anticoagulation were held.Lisinopril was held due ti TITA. Metoprolol was discontinued due to bradycardia and intermittent pauses. Patient was deemed not a candidate for invasive cardiac  workup due to GIB and TITA/CKD. The patient was deemed by cardiologist Francis Mccrary (7/18/2019) to be as optimized as possible from a cardiac perspective for endoscopic workup since improvement in hemoglobin led to improvement of ischemic markers and resolution of cardiac symptoms. Further GI workup planned to reduce risk of recurrent ischemic event.     Nephrology was consulted for TITA on chronic kidney disease. Scr was elevated to 2.7 on admission, with BUN of 71. Lisinopril was held. IVF were provided.     Patient has ulcerative colitis and was started on PPI and continued sulfasalazine.    AST elevated to 50.   Upon arrival, T 98.5F, HR 65, /75, RR 18, SpO2 99%RA    REVIEW OF SYSTEMS:   CONSTITUTIONAL: No fever, no chills, no fatigue  EYES: No eye pain, no vision changes  ENMT:  No difficulty hearing, no throat pain  RESPIRATORY: No cough, no wheezing, no sputum production; No shortness of breath  CARDIOVASCULAR: No chest pain or palpitations at this time, no leg swelling  GASTROINTESTINAL: No abdominal pain at this time, no nausea, no vomiting, no bowel movement today  GENITOURINARY: No dysuria, no hematuria  NEUROLOGICAL: No headaches, no loss of strength, no numbness  SKIN: No itching, no rashes, no lesions   MUSCULOSKELETAL: No joint pain, no joint swelling; No muscle pain  HEME/LYMPH: No easy bruising, bleeding    PMH: acoustic neuroma, CKD< gout, htn, UC    PSH: no significant surgical history     Family Hx: no fam Hx of Parkinsons    Social Hx: Retired pharmacist, 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, 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[place]    HOME MEDICATIONS: viagra 100mg PO qd, vitamin 1 tab PO qd, latanoprost 0.005% drop one drop in both eyes daily qhs, timolol 0.25% opth drop 1 drop in both eyes BID, acidophilus 1 capsule PO qd, aggrenox 25mg/200mg capsule 1 capsule PO BID, atorvastatin 20mg PO qd, carbidopa-levodopa 25/100mg 1 tablet PO TID, colace 100mg 1 tabPO BID, klonopin 0.5mg 2 tab PO qhs, linisopril 40mg PO 1 tab qd, sulfasalazine 500mg 2 tabs BID      ALLERGIES: NKDA    PHYSICAL EXAM:   T(C): 36.9 (07-18-19 @ 19:06), Max: 37.1 (07-17-19 @ 23:59)  HR: 65 (07-18-19 @ 19:06) (54 - 72)  BP: 145/75 (07-18-19 @ 19:06) (100/84 - 145/75)  RR: 18 (07-18-19 @ 19:06) (11 - 26)  SpO2: 99% (07-18-19 @ 19:06) (93% - 99%)  Wt(kg): --Vital Signs Last 24 Hrs  T(C): 36.9 (18 Jul 2019 19:06), Max: 37.1 (17 Jul 2019 23:59)  T(F): 98.5 (18 Jul 2019 19:06), Max: 98.8 (17 Jul 2019 23:59)  HR: 65 (18 Jul 2019 19:06) (54 - 72)  BP: 145/75 (18 Jul 2019 19:06) (100/84 - 145/75)  BP(mean): 92 (18 Jul 2019 16:00) (66 - 92)  RR: 18 (18 Jul 2019 19:06) (11 - 26)  SpO2: 99% (18 Jul 2019 19:06) (93% - 99%)    GENERAL: NAD, well-groomed, well-developed  HEAD:  Atraumatic, Normocephalic  EYES: EOMI, PERRLA, conjunctiva and sclera clear  ENMT: No oropharyngeal exudates, erythema or lesions,  Moist mucous membranes  NECK: Supple, no cervical lymphadenopathy  NERVOUS SYSTEM:  Alert & Oriented X3, CN II-XII intact, 5/5 BUE and BLE motor strength, full sensation to light touch, slow to respond to questions  CHEST/LUNG: Clear to auscultation bilaterally; No rales, no  rhonchi, no wheezing  HEART: Regular rate and rhythm; No murmurs, rubs, or gallops  ABDOMEN: Soft, Nontender, Nondistended  EXTREMITIES:  2+ Peripheral Pulses, No clubbing, cyanosis, or edema  PSYCH: calm, logical thought process  SKIN: No rashes or lesions    Labs, imaging and EKG personally reviewed by me.     LABS:  Hgb stable from yesterday at 9.4.   INR slightly elevated at 1.2.    CMP found elevated serum chloride of 110.  SCr is downtrending from 2.7 on admission to 1.76 now.  Total serum protein is low at 5.9.   Alk phos is elevated to 151.                         9.4    8.74  )-----------( 216      ( 18 Jul 2019 06:31 )             29.4     Hgb Trend: 9.4<--, 9.2<--, 9.7<--, 8.6<--, 8.0<--  07-18    144  |  110<H>  |  23  ----------------------------<  126<H>  4.2   |  23  |  1.76<H>    Ca    8.6      18 Jul 2019 06:31  Phos  4.0     07-18    TPro  5.9<L>  /  Alb  3.0<L>  /  TBili  0.6  /  DBili  x   /  AST  22  /  ALT  10  /  AlkPhos  151<H>  07-18    Creatinine Trend: 1.76<--, 1.90<--, 2.31<--, 2.70<--  PT/INR - ( 18 Jul 2019 06:31 )   PT: 13.2 sec;   INR: 1.20 ratio       Full imaging reports on [place]  US Transthoracic Echocardiogram w/Doppler Complete (07.15.19 @ 10:29) >  SUMMARY:  1. technical difficult study, limited study  2. grossly overall left ventricular systolic function, possible   inferoseptal wall, inferior lateral wall hypokinesis. Grade one diastolic  dysfunction  3. mild mitral regurgitation    < end of copied text >    < from: CT Abdomen and Pelvis w/ Oral Cont (07.16.19 @ 13:48) >  IMPRESSION:  1. No evidence of hydronephrosis or bowel obstruction  2. Left inguinal hernia containing nonobstructed large bowel  3. There are 2 areas of potential narrowing-lesion versus spasm on the   right side of the large bowel. Advise colonoscopic evaluation.    < end of copied text >

## 2019-07-18 NOTE — PROGRESS NOTE ADULT - PROBLEM SELECTOR PLAN 5
Patient was admitted with TITA super Patient was admitted with TITA, serum creatinine is now downtrending s/p transfusion. TITA likely related to hemodynamic changes, possible ATN, pre-renal state.  Monitor SCr. Avoid nephrotoxic agents.

## 2019-07-18 NOTE — PROGRESS NOTE ADULT - ASSESSMENT
PATIENT ARVIND COLLADO [facility] [phone]   1935 DOA 7/15/2019 DR VICKEY REED      Patient description                                84m w/ parkinsons, acoustic neuroma hytn  ulcerative colitis (untreated) a/w dark stools and anemia also noted troponin elevation, acute on chronic renal failure   troponins now >8, received 2 unit prbc   Holding aggrenox  GI flood planned for  Pt transferred to [facility] as boarder 2019 and Pulm critical care consulted 2019     Hospital course   2019 Plan tx to tertiary center for nonemergent gi flood given cardiac status   2019 Pt cleared for gi procedures if Cardio agrees   2019 pulm consulted  Tr to [facility] for boarder   7/15/2019 2 u prbc given        PATIENT ARVIND COLLADO [facility] [phone]   1935 DOA 7/15/2019 DR VICKEY REED     PROBLEM/ASSESSMENT/RECOMMENDATIONS (A/R)       COPD   7/15/2019 CXR mild prominence is markings bases Flat diaphragm Emphysema Tr blunting cp angles   A/R Cl stable   MI   7/15- Tr 1 5.7-8.2-9.2   A/R On Metoprolol 12.5x2 () On statins Cannot use ac or acei for gi bleed and pb   HYPERTENSION   2019 160/76  CHF   7/15/2019 echo n lvsf Possible inferoseptal inferior lateral wall hypokinesia dd1 mild mr   A/R Monitor volume status       GI BLEED   7/15 sob pos   ULCERATIVE COLITIS   LESION COLON   2019 ctap oc 1) No hydro 2) No obstrn 3) lih 4) two areas of pptential narrowing lesion v spasm r colon colonoscopy recommended   A/R GI flood in progress  2019 Cleared from Pulm standpoint if ok with Cardio (for GI procedures)   2019 Plan is to tr to tertiary center for gi procedure given his cardiac risk strata   ANEMIA   7/15-7/15---   Hb 6.7-7.6-8.0 -8.6-9.2 - 9.4  2019 Retic 4.5 Fe 27 (d) %sat 9 (d) uibc 279 b12 fa n   A/R Monitor Hb Transfuse if below 7  TRASNFUSIONS   7/15/2019 2 u prbc   PB   7/15--- Cr 2.7-2.3-1.9-1.7  A/R Monitor serially ( Np hydro on ctap 7/15)  CONFUSION  2019 In part sec hosp psychosis      TIME SPENT Over 25 minutes aggregate care time spent on encounter; activities included   direct pt care, counseling and/or coordinating care reviewing notes, lab data/ imaging , discussion with multidisciplinary team/ pt /family. Risks, benefits, alternatives  discussed in detail

## 2019-07-18 NOTE — PROGRESS NOTE ADULT - PROBLEM SELECTOR PLAN 10
Gastrointestinal stress ulcer prophylaxis and DVT prophylaxis administrated
Gastrointestinal stress ulcer prophylaxis and DVT prophylaxis administrated

## 2019-07-18 NOTE — PROGRESS NOTE ADULT - ASSESSMENT
85 yo male Acoustic neuroma ,[place] , ,Parkinson Dz, unsteady gait ,  Chronic kidney disease  ,Gout  ,HTN (hypertension)   ,UC (ulcerative colitis) , resident of [place] [facility] brought for eval of SOB and black stools for several days. Found to have anemia and admitted for hemotransfusion and GI workup .Patient had colonoscopy 3 years ago and [place] and had some polyps removed ,he denies any recent hx of GIB or GI workup ,but admits black stools and lightheadedness ,sob ,worsening last few days .Found to have RANJANA elevation and seen by cardiologist Admitted  to telemetry unit for monitoring , send 3 sets of cardiac ensymes to rule out coronary event, obtain ECHO to evaluate LVEF, cardiology consult ,continue current management, O2 supply, anticoagulation plan as per cardiology  ,aggrenox placed on hold until GI clearance will be obtained Nephrology cons called ,IV hydration is administrated ,lisinopril held as per cardiologist recommendation .- ECG with above noted ST depressions

## 2019-07-18 NOTE — PROGRESS NOTE ADULT - SUBJECTIVE AND OBJECTIVE BOX
Chief Complaint:        INTERVAL HPI/OVERNIGHT EVENTS:  no significant events overnight reported         MEDICATIONS  (STANDING):  artificial  tears Solution 1 Drop(s) Both EYES two times a day  atorvastatin 20 milliGRAM(s) Oral at bedtime  carbidopa/levodopa  25/100 1 Tablet(s) Oral three times a day  clonazePAM  Tablet 0.5 milliGRAM(s) Oral at bedtime  dextrose 5% + sodium chloride 0.45%. 1000 milliLiter(s) (50 mL/Hr) IV Continuous <Continuous>  docusate sodium 100 milliGRAM(s) Oral two times a day  lactobacillus acidophilus 1 Tablet(s) Oral daily  latanoprost 0.005% Ophthalmic Solution 1 Drop(s) Both EYES at bedtime  multivitamin 1 Tablet(s) Oral daily  pantoprazole  Injectable 40 milliGRAM(s) IV Push every 12 hours  sulfaSALAzine 500 milliGRAM(s) Oral two times a day  timolol 0.25% Solution 1 Drop(s) Both EYES two times a day  tobramycin 0.3% Ophthalmic Solution 1 Drop(s) Right EYE two times a day    MEDICATIONS  (PRN):  acetaminophen   Tablet .. 650 milliGRAM(s) Oral every 6 hours PRN Temp greater or equal to 38C (100.4F), Mild Pain (1 - 3)  melatonin 3 milliGRAM(s) Oral at bedtime PRN Insomnia            Vital Signs Last 24 Hrs  T(C): 36.5 (18 Jul 2019 07:35), Max: 37.1 (17 Jul 2019 23:59)  T(F): 97.7 (18 Jul 2019 07:35), Max: 98.8 (17 Jul 2019 23:59)  HR: 56 (18 Jul 2019 08:00) (54 - 78)  BP: 116/56 (18 Jul 2019 08:00) (100/84 - 161/83)  BP(mean): 74 (18 Jul 2019 08:00) (66 - 108)  RR: 11 (18 Jul 2019 08:00) (11 - 33)  SpO2: 97% (18 Jul 2019 08:00) (93% - 100%)    Physical exam:    abd soft, nont nohemy  cv s1s2  chest air entry   ext no pitting edema        LABS:                        9.4    8.74  )-----------( 216      ( 18 Jul 2019 06:31 )             29.4     07-18    144  |  110<H>  |  23  ----------------------------<  126<H>  4.2   |  23  |  1.76<H>    Ca    8.6      18 Jul 2019 06:31  Phos  4.0     07-18    TPro  5.9<L>  /  Alb  3.0<L>  /  TBili  0.6  /  DBili  x   /  AST  22  /  ALT  10  /  AlkPhos  151<H>  07-18    PT/INR - ( 18 Jul 2019 06:31 )   PT: 13.2 sec;   INR: 1.20 ratio               RADIOLOGY & ADDITIONAL TESTS:

## 2019-07-18 NOTE — PROGRESS NOTE ADULT - PROBLEM SELECTOR PLAN 6
VTE ppx: venodyne boots  Activity: bedrest for now  Diet: NPO for now Start home timolol and latanoprost.   Unclear why patient was started on tobramycin at [facility]- can consider ophthalmology consult, may consdier discontinuing eyedrops in AM. C/w carbidopa-levodopa  Fall precautions

## 2019-07-18 NOTE — PROGRESS NOTE ADULT - ASSESSMENT
83 yo male Acoustic neuroma ,[place] , ,Parkinson Dz, unsteady gait ,  Chronic kidney disease  ,Gout  ,HTN (hypertension)   ,UC (ulcerative colitis) , resident of [facility] brought for eval of SOB and black stools for several days. Found to have anemia and admitted for hemotransfusion and GI workup .Patient had colonoscopy 3 years ago and [place] and had some polyps removed ,he denies any recent hx of GIB or GI workup ,but admits black stools and lightheadedness ,sob ,worsening last few days .Found to have RANJANA elevation and seen by cardiologist Admitted  to telemetry unit for monitoring , send 3 sets of cardiac ensymes to rule out coronary event, obtain ECHO to evaluate LVEF, cardiology consult ,continue current management, O2 supply, anticoagulation plan as per cardiology  ,aggrenox placed on hold until GI clearance will be obtained Nephrology cons called ,IV hydration is administrated ,lisinopril held as per cardiologist recommendation . (15 Jul 2019 08:57)

## 2019-07-18 NOTE — PROGRESS NOTE ADULT - PROBLEM SELECTOR PLAN 8
Start home timolol and latanoprost.   Unclear why patient was started on tobramycin at [facility]- can consider ophthalmology consult, may consdier discontinuing eyedrops in AM.

## 2019-07-19 LAB
ANION GAP SERPL CALC-SCNC: 14 MMOL/L — SIGNIFICANT CHANGE UP (ref 5–17)
APTT BLD: 36.6 SEC — HIGH (ref 27.5–36.3)
BLD GP AB SCN SERPL QL: NEGATIVE — SIGNIFICANT CHANGE UP
BUN SERPL-MCNC: 21 MG/DL — SIGNIFICANT CHANGE UP (ref 7–23)
CALCIUM SERPL-MCNC: 8.6 MG/DL — SIGNIFICANT CHANGE UP (ref 8.4–10.5)
CHLORIDE SERPL-SCNC: 106 MMOL/L — SIGNIFICANT CHANGE UP (ref 96–108)
CO2 SERPL-SCNC: 19 MMOL/L — LOW (ref 22–31)
CREAT SERPL-MCNC: 1.72 MG/DL — HIGH (ref 0.5–1.3)
GLUCOSE SERPL-MCNC: 126 MG/DL — HIGH (ref 70–99)
HCT VFR BLD CALC: 28.8 % — LOW (ref 39–50)
HGB BLD-MCNC: 9.7 G/DL — LOW (ref 13–17)
INR BLD: 1.15 RATIO — SIGNIFICANT CHANGE UP (ref 0.88–1.16)
MCHC RBC-ENTMCNC: 33 PG — SIGNIFICANT CHANGE UP (ref 27–34)
MCHC RBC-ENTMCNC: 33.8 GM/DL — SIGNIFICANT CHANGE UP (ref 32–36)
MCV RBC AUTO: 97.5 FL — SIGNIFICANT CHANGE UP (ref 80–100)
PLATELET # BLD AUTO: 227 K/UL — SIGNIFICANT CHANGE UP (ref 150–400)
POTASSIUM SERPL-MCNC: 3.9 MMOL/L — SIGNIFICANT CHANGE UP (ref 3.5–5.3)
POTASSIUM SERPL-SCNC: 3.9 MMOL/L — SIGNIFICANT CHANGE UP (ref 3.5–5.3)
PROTHROM AB SERPL-ACNC: 13.2 SEC — HIGH (ref 10–12.9)
RBC # BLD: 2.95 M/UL — LOW (ref 4.2–5.8)
RBC # FLD: 14.8 % — HIGH (ref 10.3–14.5)
RH IG SCN BLD-IMP: POSITIVE — SIGNIFICANT CHANGE UP
SODIUM SERPL-SCNC: 139 MMOL/L — SIGNIFICANT CHANGE UP (ref 135–145)
WBC # BLD: 6.4 K/UL — SIGNIFICANT CHANGE UP (ref 3.8–10.5)
WBC # FLD AUTO: 6.4 K/UL — SIGNIFICANT CHANGE UP (ref 3.8–10.5)

## 2019-07-19 RX ORDER — SODIUM CHLORIDE 9 MG/ML
500 INJECTION, SOLUTION INTRAVENOUS
Refills: 0 | Status: DISCONTINUED | OUTPATIENT
Start: 2019-07-19 | End: 2019-07-26

## 2019-07-19 RX ORDER — SODIUM CHLORIDE 9 MG/ML
500 INJECTION, SOLUTION INTRAVENOUS
Refills: 0 | Status: DISCONTINUED | OUTPATIENT
Start: 2019-07-19 | End: 2019-07-19

## 2019-07-19 RX ADMIN — CARBIDOPA AND LEVODOPA 1 TABLET(S): 25; 100 TABLET ORAL at 21:08

## 2019-07-19 RX ADMIN — PANTOPRAZOLE SODIUM 40 MILLIGRAM(S): 20 TABLET, DELAYED RELEASE ORAL at 17:10

## 2019-07-19 RX ADMIN — ATORVASTATIN CALCIUM 80 MILLIGRAM(S): 80 TABLET, FILM COATED ORAL at 21:08

## 2019-07-19 RX ADMIN — PANTOPRAZOLE SODIUM 40 MILLIGRAM(S): 20 TABLET, DELAYED RELEASE ORAL at 06:23

## 2019-07-19 RX ADMIN — Medication 1 DROP(S): at 06:23

## 2019-07-19 RX ADMIN — Medication 500 MILLIGRAM(S): at 17:02

## 2019-07-19 RX ADMIN — Medication 3 MILLIGRAM(S): at 21:08

## 2019-07-19 RX ADMIN — Medication 1 DROP(S): at 17:02

## 2019-07-19 RX ADMIN — LATANOPROST 1 DROP(S): 0.05 SOLUTION/ DROPS OPHTHALMIC; TOPICAL at 21:10

## 2019-07-19 RX ADMIN — Medication 500 MILLIGRAM(S): at 06:23

## 2019-07-19 RX ADMIN — CARBIDOPA AND LEVODOPA 1 TABLET(S): 25; 100 TABLET ORAL at 06:23

## 2019-07-19 RX ADMIN — Medication 1 DROP(S): at 17:03

## 2019-07-19 RX ADMIN — Medication 1 TABLET(S): at 14:44

## 2019-07-19 RX ADMIN — CARBIDOPA AND LEVODOPA 1 TABLET(S): 25; 100 TABLET ORAL at 14:44

## 2019-07-19 NOTE — PROGRESS NOTE ADULT - SUBJECTIVE AND OBJECTIVE BOX
Patient is a 84y old  Male who presents with a chief complaint of SOB , anemia ,black stools (19 Jul 2019 14:08)      INTERVAL HPI/OVERNIGHT EVENTS:  T(C): 36.8 (07-19-19 @ 11:35), Max: 36.9 (07-18-19 @ 19:06)  HR: 56 (07-19-19 @ 11:35) (56 - 66)  BP: 146/79 (07-19-19 @ 11:35) (139/70 - 158/70)  RR: 18 (07-19-19 @ 11:35) (18 - 18)  SpO2: 97% (07-19-19 @ 11:35) (97% - 99%)  Wt(kg): --  I&O's Summary    18 Jul 2019 07:01  -  19 Jul 2019 07:00  --------------------------------------------------------  IN: 500 mL / OUT: 1000 mL / NET: -500 mL        LABS:                        9.7    6.4   )-----------( 227      ( 19 Jul 2019 10:19 )             28.8     07-19    139  |  106  |  21  ----------------------------<  126<H>  3.9   |  19<L>  |  1.72<H>    Ca    8.6      19 Jul 2019 10:19  Phos  4.0     07-18    TPro  5.9<L>  /  Alb  3.0<L>  /  TBili  0.6  /  DBili  x   /  AST  22  /  ALT  10  /  AlkPhos  151<H>  07-18    PT/INR - ( 19 Jul 2019 10:19 )   PT: 13.2 sec;   INR: 1.15 ratio         PTT - ( 19 Jul 2019 10:19 )  PTT:36.6 sec    CAPILLARY BLOOD GLUCOSE                MEDICATIONS  (STANDING):  atorvastatin 80 milliGRAM(s) Oral at bedtime  carbidopa/levodopa  25/100 1 Tablet(s) Oral three times a day  latanoprost 0.005% Ophthalmic Solution 1 Drop(s) Both EYES at bedtime  melatonin 3 milliGRAM(s) Oral at bedtime  multivitamin 1 Tablet(s) Oral daily  pantoprazole  Injectable 40 milliGRAM(s) IV Push every 12 hours  sulfaSALAzine 500 milliGRAM(s) Oral two times a day  timolol 0.25% Solution 1 Drop(s) Both EYES two times a day  tobramycin 0.3% Ophthalmic Solution 1 Drop(s) Both EYES two times a day    MEDICATIONS  (PRN):  acetaminophen   Tablet .. 650 milliGRAM(s) Oral every 6 hours PRN Temp greater or equal to 38C (100.4F), Mild Pain (1 - 3), Moderate Pain (4 - 6)          PHYSICAL EXAM:  GENERAL: NAD, well-groomed, well-developed  HEAD:  Atraumatic, Normocephalic  CHEST/LUNG: Clear to percussion bilaterally; No rales, rhonchi, wheezing, or rubs  HEART: Regular rate and rhythm; No murmurs, rubs, or gallops  ABDOMEN: Soft, Nontender, Nondistended; Bowel sounds present  EXTREMITIES:  2+ Peripheral Pulses, No clubbing, cyanosis, or edema  LYMPH: No lymphadenopathy noted  SKIN: No rashes or lesions    Care Discussed with Consultants/Other Providers [ ] YES  [ ] NO

## 2019-07-19 NOTE — PROGRESS NOTE ADULT - SUBJECTIVE AND OBJECTIVE BOX
Patient is a 84y Male whom presented to the hospital with ckd and pb    seen in am  PAST MEDICAL & SURGICAL HISTORY:  UC (ulcerative colitis)  HTN (hypertension)  UC (ulcerative colitis)  HTN (hypertension)  Gout  Chronic kidney disease  Acoustic neuroma  No significant past surgical history  No significant past surgical history      MEDICATIONS  (STANDING):  atorvastatin 20 milliGRAM(s) Oral at bedtime  carbidopa/levodopa  25/100 1 Tablet(s) Oral three times a day  clonazePAM  Tablet 0.5 milliGRAM(s) Oral at bedtime  docusate sodium 100 milliGRAM(s) Oral two times a day  lactobacillus acidophilus 1 Tablet(s) Oral daily  latanoprost 0.005% Ophthalmic Solution 1 Drop(s) Both EYES at bedtime  multivitamin 1 Tablet(s) Oral daily  pantoprazole  Injectable 40 milliGRAM(s) IV Push every 12 hours  sodium chloride 0.9%. 1000 milliLiter(s) (75 mL/Hr) IV Continuous <Continuous>  sulfaSALAzine 500 milliGRAM(s) Oral two times a day  timolol 0.25% Solution 1 Drop(s) Both EYES two times a day      Allergies    No Known Allergies    Intolerances        SOCIAL HISTORY:  Denies ETOh,Smoking,     FAMILY HISTORY:      REVIEW OF SYSTEMS:    CONSTITUTIONAL: No weakness, fevers or chills  RESPIRATORY: No cough, wheezing, hemoptysis; pos  shortness of breath  CARDIOVASCULAR: No chest pain or palpitations  GASTROINTESTINAL: No abdominal or epigastric pain. No nausea, vomiting,     No diarrhea or constipation. pos  melena                                                                   9.7    6.4   )-----------( 227      ( 19 Jul 2019 10:19 )             28.8       CBC Full  -  ( 19 Jul 2019 10:19 )  WBC Count : 6.4 K/uL  RBC Count : 2.95 M/uL  Hemoglobin : 9.7 g/dL  Hematocrit : 28.8 %  Platelet Count - Automated : 227 K/uL  Mean Cell Volume : 97.5 fl  Mean Cell Hemoglobin : 33.0 pg  Mean Cell Hemoglobin Concentration : 33.8 gm/dL  Auto Neutrophil # : x  Auto Lymphocyte # : x  Auto Monocyte # : x  Auto Eosinophil # : x  Auto Basophil # : x  Auto Neutrophil % : x  Auto Lymphocyte % : x  Auto Monocyte % : x  Auto Eosinophil % : x  Auto Basophil % : x      07-19    139  |  106  |  21  ----------------------------<  126<H>  3.9   |  19<L>  |  1.72<H>    Ca    8.6      19 Jul 2019 10:19  Phos  4.0     07-18    TPro  5.9<L>  /  Alb  3.0<L>  /  TBili  0.6  /  DBili  x   /  AST  22  /  ALT  10  /  AlkPhos  151<H>  07-18      CAPILLARY BLOOD GLUCOSE          Vital Signs Last 24 Hrs  T(C): 36.6 (19 Jul 2019 21:07), Max: 36.9 (19 Jul 2019 04:05)  T(F): 97.9 (19 Jul 2019 21:07), Max: 98.5 (19 Jul 2019 04:05)  HR: 72 (19 Jul 2019 21:07) (56 - 72)  BP: 158/77 (19 Jul 2019 21:07) (139/70 - 158/77)  BP(mean): --  RR: 18 (19 Jul 2019 21:07) (18 - 18)  SpO2: 98% (19 Jul 2019 21:07) (97% - 98%)        PT/INR - ( 19 Jul 2019 10:19 )   PT: 13.2 sec;   INR: 1.15 ratio         PTT - ( 19 Jul 2019 10:19 )  PTT:36.6 sec        PHYSICAL EXAM:    Constitutional: NAD  HEENT: conjunctive   clear   Neck:  No JVD  Respiratory: decrease bs b/l   Cardiovascular: S1 and S2  Gastrointestinal: BS+, soft, NT/ND  Extremities: trace  peripheral edema  Neurological:  no focal deficits  Psychiatric: normal affect  Skin: dry  Access: Not applicable

## 2019-07-19 NOTE — PROGRESS NOTE ADULT - ASSESSMENT
85 yo male Acoustic neuroma ,[place] , ,Parkinson Dz, unsteady gait ,  Chronic kidney disease  ,Gout  ,HTN (hypertension)   ,UC (ulcerative colitis) , resident of [facility] brought for eval of SOB and black stools for several days. Found to have anemia and admitted for hemotransfusion and GI workup .Patient had colonoscopy 3 years ago and [place] and had some polyps removed ,he denies any recent hx of GIB or GI workup ,but admits black stools and lightheadedness ,sob ,worsening last few days .Found to have RANJANA elevation and seen by cardiologist Admitted  to telemetry unit for monitoring , send 3 sets of cardiac ensymes to rule out coronary event, obtain ECHO to evaluate LVEF, cardiology consult ,continue current management, O2 supply, anticoagulation plan as per cardiology  ,aggrenox placed on hold until GI clearance will be obtained Nephrology cons called ,IV hydration is administrated ,lisinopril held as per cardiologist recommendation . (15 Jul 2019 08:57)

## 2019-07-19 NOTE — PROGRESS NOTE ADULT - ASSESSMENT
Problem/Plan - 1:  ·  Problem: GIB (gastrointestinal bleeding).  Plan: SERIAL CBC  GI FU   virtual colonoscopy pending     Problem/Plan - 2:  ·  Problem: Troponin level elevated.  Plan: 2/2 to possible  NSTEMI -  telemonitor  cards fu     Problem/Plan - 3:  ·  Problem: Abnormal CT of the abdomen.  Plan: check virtual colonoscopy     Problem/Plan - 4:  ·  Problem: Anemia.  Plan: monitor cbc  transfuse PRN      Problem/Plan - 5:  ·  Problem: UC (ulcerative colitis).  Plan: continue home medications  gi following

## 2019-07-19 NOTE — CONSULT NOTE ADULT - SUBJECTIVE AND OBJECTIVE BOX
[facility]  Ramy Arreaga PA-C  [address] [place] [address]  [phone]      Chief Complaint:  Patient is a 84y old  Male who presents with a chief complaint of SOB , anemia ,black stools (2019 21:48)      HPI: 84M who presented to [facility] with chest pain, anemia, dark stool was found to have NSTEMI and anemia  he was seen by GI (not our group) who recommended egd/colonoscopy  anesthesia felt he was too high risk to undergo anesthesia at that hospital and recommended transfer.   of note patient had ct scan at [place] which showed ? right colon mass as well as inguinal hernia which contained colon that was not told to patient.     Allergies:  No Known Allergies      Medications:  acetaminophen   Tablet .. 650 milliGRAM(s) Oral every 6 hours PRN  atorvastatin 80 milliGRAM(s) Oral at bedtime  carbidopa/levodopa  25/100 1 Tablet(s) Oral three times a day  latanoprost 0.005% Ophthalmic Solution 1 Drop(s) Both EYES at bedtime  melatonin 3 milliGRAM(s) Oral at bedtime  multivitamin 1 Tablet(s) Oral daily  pantoprazole  Injectable 40 milliGRAM(s) IV Push every 12 hours  sulfaSALAzine 500 milliGRAM(s) Oral two times a day  timolol 0.25% Solution 1 Drop(s) Both EYES two times a day  tobramycin 0.3% Ophthalmic Solution 1 Drop(s) Both EYES two times a day      PMHX/PSHX:  UC (ulcerative colitis)  HTN (hypertension)  UC (ulcerative colitis)  HTN (hypertension)  Gout  Chronic kidney disease  Acoustic neuroma  No significant past surgical history  No significant past surgical history      Family history:      Social History:     ROS:     General:  No wt loss, fevers, chills, night sweats, fatigue,   Eyes:  Good vision, no reported pain  ENT:  No sore throat, pain, runny nose, dysphagia  CV:  No pain, palpitations, hypo/hypertension  Resp:  No dyspnea, cough, tachypnea, wheezing  GI:  No pain, No nausea, No vomiting, No diarrhea, No constipation, No weight loss, No fever, No pruritis, No rectal bleeding, No tarry stools, No dysphagia,  :  No pain, bleeding, incontinence, nocturia  Muscle:  No pain, weakness  Neuro:  No weakness, tingling, memory problems  Psych:  No fatigue, insomnia, mood problems, depression  Endocrine:  No polyuria, polydipsia, cold/heat intolerance  Heme:  No petechiae, ecchymosis, easy bruisability  Skin:  No rash, tattoos, scars, edema      PHYSICAL EXAM:   Vital Signs:  Vital Signs Last 24 Hrs  T(C): 36.8 (2019 11:35), Max: 36.9 (2019 19:06)  T(F): 98.3 (2019 11:35), Max: 98.5 (2019 19:06)  HR: 56 (2019 11:35) (55 - 66)  BP: 146/79 (2019 11:35) (137/75 - 158/70)  BP(mean): 92 (2019 16:00) (92 - 92)  RR: 18 (2019 11:35) (17 - 18)  SpO2: 97% (2019 11:35) (96% - 99%)  Daily     Daily Weight in k.3 (2019 08:31)    GENERAL:  Appears stated age, well-groomed, well-nourished, no distress  HEENT:  NC/AT,  conjunctivae clear and pink, no thyromegaly, nodules, adenopathy, no JVD, sclera -anicteric  CHEST:  Full & symmetric excursion, no increased effort, breath sounds clear  HEART:  Regular rhythm, S1, S2, no murmur/rub/S3/S4, no abdominal bruit, no edema  ABDOMEN:  Soft, non-tender, non-distended, normoactive bowel sounds,  no masses ,no hepato-splenomegaly, no signs of chronic liver disease  EXTEREMITIES:  no cyanosis,clubbing or edema  SKIN:  No rash/erythema/ecchymoses/petechiae/wounds/abscess/warm/dry  NEURO:  Alert, oriented, no asterixis, no tremor, no encephalopathy    LABS:                        9.7    6.4   )-----------( 227      ( 2019 10:19 )             28.8     07-    139  |  106  |  21  ----------------------------<  126<H>  3.9   |  19<L>  |  1.72<H>    Ca    8.6      2019 10:19  Phos  4.0         TPro  5.9<L>  /  Alb  3.0<L>  /  TBili  0.6  /  DBili  x   /  AST  22  /  ALT  10  /  AlkPhos  151<H>      LIVER FUNCTIONS - ( 2019 06:31 )  Alb: 3.0 g/dL / Pro: 5.9 g/dL / ALK PHOS: 151 U/L / ALT: 10 U/L DA / AST: 22 U/L / GGT: x           PT/INR - ( 2019 10:19 )   PT: 13.2 sec;   INR: 1.15 ratio         PTT - ( 2019 10:19 )  PTT:36.6 sec        Imaging:

## 2019-07-19 NOTE — CONSULT NOTE ADULT - PROBLEM SELECTOR RECOMMENDATION 9
cea is wnl limit  hgb now stable   cardiology eval given NSTEMI  given abnormal CT and hernia containing colon, would first do Virtual colonoscopy  as prior ct findings may be related to under-distention  regular diet  proton pump inhibitor daily
CHRONIC KIDNEY DISEASE, STAGE 3: increase water intake , continue with iv fluid   Serum creatinine is stable at 2.7, approximating a GFR of is decreased  ml/min.   There is no progression.  No uremic symptoms. No evidence of  worsening  Anemia. Fluid status stable.   Will continue to avoid nephrotoxic drugs.  Patient remains asymptomatic.  Continue current therapy.

## 2019-07-19 NOTE — CONSULT NOTE ADULT - SUBJECTIVE AND OBJECTIVE BOX
Requesting Physician : Dr. Caballero    Reason for Consultation: SOB, elevated troponin     HISTORY OF PRESENT ILLNESS:  83 yo M with history of HTN, CKD, UC, Parkinson's disease who is being seen for elevated troponin.  The patient was initially admitted from [facility] to [facility] with black stools and dyspnea.  He was diagnosed with UGIB there and transferred to [facility] for further care and GI workup.  The patient was found to have elevated troponin with negative CPK at [facility] for which cardiologist is now consulted.  The patient denies chest pain.  He reports a several month history of JOSHI.  No orthopnea, LE edema, or PND.         PAST MEDICAL & SURGICAL HISTORY:  UC (ulcerative colitis)  HTN (hypertension)  UC (ulcerative colitis)  HTN (hypertension)  Gout  Chronic kidney disease  Acoustic neuroma  No significant past surgical history  No significant past surgical history          MEDICATIONS:  MEDICATIONS  (STANDING):  atorvastatin 80 milliGRAM(s) Oral at bedtime  carbidopa/levodopa  25/100 1 Tablet(s) Oral three times a day  latanoprost 0.005% Ophthalmic Solution 1 Drop(s) Both EYES at bedtime  melatonin 3 milliGRAM(s) Oral at bedtime  multivitamin 1 Tablet(s) Oral daily  pantoprazole  Injectable 40 milliGRAM(s) IV Push every 12 hours  sulfaSALAzine 500 milliGRAM(s) Oral two times a day  timolol 0.25% Solution 1 Drop(s) Both EYES two times a day  tobramycin 0.3% Ophthalmic Solution 1 Drop(s) Both EYES two times a day      Allergies    No Known Allergies    Intolerances        FAMILY HISTORY:    Non-contributary for premature coronary disease or sudden cardiac death    SOCIAL HISTORY:    [x ] Non-smoker  [ ] Smoker  [ ] Alcohol      REVIEW OF SYSTEMS:  [ ]chest pain  [x  ]shortness of breath  [  ]palpitations  [  ]syncope  [ ]near syncope [ ]upper extremity weakness   [ ] lower extremity weakness  [  ]diplopia  [  ]altered mental status   [  ]fevers  [ ]chills [ ]nausea  [ ]vomitting  [  ]dysphagia    [ ]abdominal pain  [x ]melena  [ ]BRBPR    [  ]epistaxis  [  ]rash    [ ]lower extremity edema        [x ] All others negative	  [ ] Unable to obtain    PHYSICAL EXAM:  T(C): 36.8 (07-19-19 @ 11:35), Max: 36.9 (07-18-19 @ 19:06)  HR: 56 (07-19-19 @ 11:35) (56 - 66)  BP: 146/79 (07-19-19 @ 11:35) (139/70 - 158/70)  RR: 18 (07-19-19 @ 11:35) (18 - 18)  SpO2: 97% (07-19-19 @ 11:35) (97% - 99%)  Wt(kg): --  I&O's Summary    18 Jul 2019 07:01  -  19 Jul 2019 07:00  --------------------------------------------------------  IN: 500 mL / OUT: 1000 mL / NET: -500 mL        	  Lymphatic: No lymphadenopathy , no edema  Cardiovascular: Normal S1 S2,RRR, No murmurs , Peripheral pulses palpable 2+ bilaterally  Respiratory: Lungs clear to auscultation, normal effort 	  Gastrointestinal:  Soft, Non-tender, + BS	  Skin: No rashes, No ecchymoses, No cyanosis, warm to touch        TELEMETRY: SR	    ECG:  < from: 12 Lead ECG (07.16.19 @ 18:01) >  Diagnosis Line Normal sinus rhythm  Right bundle branch block  Possible Inferior infarct , age undetermined  Abnormal ECG  When compared with ECG of 15-JUL-2019 06:43,  No significant change was found    < end of copied text >  	  RADIOLOGY:  OTHER:     DIAGNOSTIC TESTING:  [ ] Echocardiogram:   [ ]  Catheterization:   [ ] Stress Test:     	  	  LABS:	 	    CARDIAC MARKERS:  CARDIAC MARKERS ( 16 Jul 2019 17:13 )  5.882 ng/mL / x     / x     / x     / x                                  9.7    6.4   )-----------( 227      ( 19 Jul 2019 10:19 )             28.8     07-19    139  |  106  |  21  ----------------------------<  126<H>  3.9   |  19<L>  |  1.72<H>    Ca    8.6      19 Jul 2019 10:19  Phos  4.0     07-18    TPro  5.9<L>  /  Alb  3.0<L>  /  TBili  0.6  /  DBili  x   /  AST  22  /  ALT  10  /  AlkPhos  151<H>  07-18    proBNP:   Lipid Profile:   HgA1c:   TSH:     ASSESSMENT/PLAN: 83 yo M with history of HTN, CKD, UC, Parkinson's disease who is being seen for elevated troponin.    -pt. with no chest pain or anginal symptoms currently  -given negative CPK and no chest pain, do not suspect acs  -suspect elevated troponin are due to demand ischemia from GIB and also secondary to renal failure  -would check TTE to evaluate LV function  -monitor h/h  -further cardiac workup pending above    Herson Page MD

## 2019-07-20 LAB
ANION GAP SERPL CALC-SCNC: 17 MMOL/L — SIGNIFICANT CHANGE UP (ref 5–17)
BUN SERPL-MCNC: 20 MG/DL — SIGNIFICANT CHANGE UP (ref 7–23)
CALCIUM SERPL-MCNC: 8.6 MG/DL — SIGNIFICANT CHANGE UP (ref 8.4–10.5)
CHLORIDE SERPL-SCNC: 108 MMOL/L — SIGNIFICANT CHANGE UP (ref 96–108)
CO2 SERPL-SCNC: 18 MMOL/L — LOW (ref 22–31)
CREAT SERPL-MCNC: 1.74 MG/DL — HIGH (ref 0.5–1.3)
GLUCOSE BLDC GLUCOMTR-MCNC: 102 MG/DL — HIGH (ref 70–99)
GLUCOSE SERPL-MCNC: 98 MG/DL — SIGNIFICANT CHANGE UP (ref 70–99)
HCT VFR BLD CALC: 30.3 % — LOW (ref 39–50)
HGB BLD-MCNC: 9.5 G/DL — LOW (ref 13–17)
MAGNESIUM SERPL-MCNC: 1.8 MG/DL — SIGNIFICANT CHANGE UP (ref 1.6–2.6)
MCHC RBC-ENTMCNC: 31.4 GM/DL — LOW (ref 32–36)
MCHC RBC-ENTMCNC: 31.5 PG — SIGNIFICANT CHANGE UP (ref 27–34)
MCV RBC AUTO: 100.3 FL — HIGH (ref 80–100)
PLATELET # BLD AUTO: 236 K/UL — SIGNIFICANT CHANGE UP (ref 150–400)
POTASSIUM SERPL-MCNC: 4.1 MMOL/L — SIGNIFICANT CHANGE UP (ref 3.5–5.3)
POTASSIUM SERPL-SCNC: 4.1 MMOL/L — SIGNIFICANT CHANGE UP (ref 3.5–5.3)
RBC # BLD: 3.02 M/UL — LOW (ref 4.2–5.8)
RBC # FLD: 15.5 % — HIGH (ref 10.3–14.5)
SODIUM SERPL-SCNC: 143 MMOL/L — SIGNIFICANT CHANGE UP (ref 135–145)
WBC # BLD: 6.44 K/UL — SIGNIFICANT CHANGE UP (ref 3.8–10.5)
WBC # FLD AUTO: 6.44 K/UL — SIGNIFICANT CHANGE UP (ref 3.8–10.5)

## 2019-07-20 PROCEDURE — 74263 CT COLONOGRAPHY SCREENING: CPT | Mod: 26

## 2019-07-20 RX ADMIN — CARBIDOPA AND LEVODOPA 1 TABLET(S): 25; 100 TABLET ORAL at 06:32

## 2019-07-20 RX ADMIN — Medication 1 DROP(S): at 17:55

## 2019-07-20 RX ADMIN — Medication 500 MILLIGRAM(S): at 06:32

## 2019-07-20 RX ADMIN — CARBIDOPA AND LEVODOPA 1 TABLET(S): 25; 100 TABLET ORAL at 14:54

## 2019-07-20 RX ADMIN — Medication 500 MILLIGRAM(S): at 17:56

## 2019-07-20 RX ADMIN — PANTOPRAZOLE SODIUM 40 MILLIGRAM(S): 20 TABLET, DELAYED RELEASE ORAL at 11:45

## 2019-07-20 RX ADMIN — PANTOPRAZOLE SODIUM 40 MILLIGRAM(S): 20 TABLET, DELAYED RELEASE ORAL at 17:55

## 2019-07-20 RX ADMIN — CARBIDOPA AND LEVODOPA 1 TABLET(S): 25; 100 TABLET ORAL at 22:16

## 2019-07-20 RX ADMIN — Medication 1 DROP(S): at 06:32

## 2019-07-20 RX ADMIN — Medication 1 TABLET(S): at 12:00

## 2019-07-20 RX ADMIN — Medication 1 DROP(S): at 17:56

## 2019-07-20 NOTE — PROGRESS NOTE ADULT - SUBJECTIVE AND OBJECTIVE BOX
Patient is a 84y old  Male who presents with a chief complaint of SOB , anemia ,black stools (20 Jul 2019 13:02)      INTERVAL HPI/OVERNIGHT EVENTS:  T(C): 36.9 (07-20-19 @ 11:43), Max: 36.9 (07-20-19 @ 11:43)  HR: 56 (07-20-19 @ 17:52) (56 - 72)  BP: 140/70 (07-20-19 @ 17:52) (140/70 - 168/79)  RR: 16 (07-20-19 @ 11:43) (16 - 18)  SpO2: 99% (07-20-19 @ 11:43) (98% - 99%)  Wt(kg): --  I&O's Summary    20 Jul 2019 07:01  -  20 Jul 2019 19:12  --------------------------------------------------------  IN: 200 mL / OUT: 0 mL / NET: 200 mL        LABS:                        9.5    6.44  )-----------( 236      ( 20 Jul 2019 10:53 )             30.3     07-20    143  |  108  |  20  ----------------------------<  98  4.1   |  18<L>  |  1.74<H>    Ca    8.6      20 Jul 2019 05:20  Mg     1.8     07-20      PT/INR - ( 19 Jul 2019 10:19 )   PT: 13.2 sec;   INR: 1.15 ratio         PTT - ( 19 Jul 2019 10:19 )  PTT:36.6 sec    CAPILLARY BLOOD GLUCOSE      POCT Blood Glucose.: 102 mg/dL (20 Jul 2019 12:18)            MEDICATIONS  (STANDING):  atorvastatin 80 milliGRAM(s) Oral at bedtime  carbidopa/levodopa  25/100 1 Tablet(s) Oral three times a day  lactated ringers. 500 milliLiter(s) (50 mL/Hr) IV Continuous <Continuous>  latanoprost 0.005% Ophthalmic Solution 1 Drop(s) Both EYES at bedtime  melatonin 3 milliGRAM(s) Oral at bedtime  multivitamin 1 Tablet(s) Oral daily  pantoprazole  Injectable 40 milliGRAM(s) IV Push every 12 hours  sulfaSALAzine 500 milliGRAM(s) Oral two times a day  timolol 0.25% Solution 1 Drop(s) Both EYES two times a day  tobramycin 0.3% Ophthalmic Solution 1 Drop(s) Both EYES two times a day    MEDICATIONS  (PRN):  acetaminophen   Tablet .. 650 milliGRAM(s) Oral every 6 hours PRN Temp greater or equal to 38C (100.4F), Mild Pain (1 - 3), Moderate Pain (4 - 6)          PHYSICAL EXAM:  GENERAL: NAD, well-groomed, well-developed  HEAD:  Atraumatic, Normocephalic  CHEST/LUNG: Clear to percussion bilaterally; No rales, rhonchi, wheezing, or rubs  HEART: Regular rate and rhythm; No murmurs, rubs, or gallops  ABDOMEN: Soft, Nontender, Nondistended; Bowel sounds present  EXTREMITIES:  2+ Peripheral Pulses, No clubbing, cyanosis, or edema  LYMPH: No lymphadenopathy noted  SKIN: No rashes or lesions    Care Discussed with Consultants/Other Providers [ ] YES  [ ] NO

## 2019-07-20 NOTE — PROGRESS NOTE ADULT - SUBJECTIVE AND OBJECTIVE BOX
pt seen and examined, no complaints, ROS - .       acetaminophen   Tablet .. 650 milliGRAM(s) Oral every 6 hours PRN  atorvastatin 80 milliGRAM(s) Oral at bedtime  carbidopa/levodopa  25/100 1 Tablet(s) Oral three times a day  lactated ringers. 500 milliLiter(s) IV Continuous <Continuous>  latanoprost 0.005% Ophthalmic Solution 1 Drop(s) Both EYES at bedtime  melatonin 3 milliGRAM(s) Oral at bedtime  multivitamin 1 Tablet(s) Oral daily  pantoprazole  Injectable 40 milliGRAM(s) IV Push every 12 hours  sulfaSALAzine 500 milliGRAM(s) Oral two times a day  timolol 0.25% Solution 1 Drop(s) Both EYES two times a day  tobramycin 0.3% Ophthalmic Solution 1 Drop(s) Both EYES two times a day                            9.7    6.4   )-----------( 227      ( 19 Jul 2019 10:19 )             28.8       Hemoglobin: 9.7 g/dL (07-19 @ 10:19)  Hemoglobin: 9.4 g/dL (07-18 @ 06:31)  Hemoglobin: 9.2 g/dL (07-17 @ 06:32)  Hemoglobin: 9.7 g/dL (07-16 @ 17:13)  Hemoglobin: 8.6 g/dL (07-16 @ 07:50)      07-19    139  |  106  |  21  ----------------------------<  126<H>  3.9   |  19<L>  |  1.72<H>    Ca    8.6      19 Jul 2019 10:19  Phos  4.0     07-18    TPro  5.9<L>  /  Alb  3.0<L>  /  TBili  0.6  /  DBili  x   /  AST  22  /  ALT  10  /  AlkPhos  151<H>  07-18    Creatinine Trend: 1.72<--, 1.76<--, 1.90<--, 2.31<--, 2.70<--    COAGS: PT/INR - ( 19 Jul 2019 10:19 )   PT: 13.2 sec;   INR: 1.15 ratio         PTT - ( 19 Jul 2019 10:19 )  PTT:36.6 sec          T(C): 36.6 (07-19-19 @ 21:07), Max: 36.9 (07-19-19 @ 04:05)  HR: 72 (07-19-19 @ 21:07) (56 - 72)  BP: 158/77 (07-19-19 @ 21:07) (139/70 - 158/77)  RR: 18 (07-19-19 @ 21:07) (18 - 18)  SpO2: 98% (07-19-19 @ 21:07) (97% - 98%)  Wt(kg): --    I&O's Summary    18 Jul 2019 07:01  -  19 Jul 2019 07:00  --------------------------------------------------------  IN: 500 mL / OUT: 1000 mL / NET: -500 mL      	  Lymphatic: No lymphadenopathy , no edema  Cardiovascular: Normal S1 S2,RRR, No murmurs , Peripheral pulses palpable 2+ bilaterally  Respiratory: Lungs clear to auscultation, normal effort 	  Gastrointestinal:  Soft, Non-tender, + BS	  Skin: No rashes, No ecchymoses, No cyanosis, warm to touch        TELEMETRY: SR	    ECG:  < from: 12 Lead ECG (07.16.19 @ 18:01) >  Diagnosis Line Normal sinus rhythm  Right bundle branch block  Possible Inferior infarct , age undetermined  Abnormal ECG  When compared with ECG of 15-JUL-2019 06:43,  No significant change was found    < end of copied text >      ASSESSMENT/PLAN: 83 yo M with history of HTN, CKD, UC, Parkinson's disease who is being seen for elevated troponin.    -pt. remains with no chest pain or anginal symptoms    - cardiac markers noted .   -suspect elevated troponin are due to demand ischemia from GIB and also secondary to renal failure  -would check TTE to evaluate LV function  -monitor h/h  guru pt seen and examined, no complaints, ROS - .       acetaminophen   Tablet .. 650 milliGRAM(s) Oral every 6 hours PRN  atorvastatin 80 milliGRAM(s) Oral at bedtime  carbidopa/levodopa  25/100 1 Tablet(s) Oral three times a day  lactated ringers. 500 milliLiter(s) IV Continuous <Continuous>  latanoprost 0.005% Ophthalmic Solution 1 Drop(s) Both EYES at bedtime  melatonin 3 milliGRAM(s) Oral at bedtime  multivitamin 1 Tablet(s) Oral daily  pantoprazole  Injectable 40 milliGRAM(s) IV Push every 12 hours  sulfaSALAzine 500 milliGRAM(s) Oral two times a day  timolol 0.25% Solution 1 Drop(s) Both EYES two times a day  tobramycin 0.3% Ophthalmic Solution 1 Drop(s) Both EYES two times a day                            9.7    6.4   )-----------( 227      ( 19 Jul 2019 10:19 )             28.8       Hemoglobin: 9.7 g/dL (07-19 @ 10:19)  Hemoglobin: 9.4 g/dL (07-18 @ 06:31)  Hemoglobin: 9.2 g/dL (07-17 @ 06:32)  Hemoglobin: 9.7 g/dL (07-16 @ 17:13)  Hemoglobin: 8.6 g/dL (07-16 @ 07:50)      07-19    139  |  106  |  21  ----------------------------<  126<H>  3.9   |  19<L>  |  1.72<H>    Ca    8.6      19 Jul 2019 10:19  Phos  4.0     07-18    TPro  5.9<L>  /  Alb  3.0<L>  /  TBili  0.6  /  DBili  x   /  AST  22  /  ALT  10  /  AlkPhos  151<H>  07-18    Creatinine Trend: 1.72<--, 1.76<--, 1.90<--, 2.31<--, 2.70<--    COAGS: PT/INR - ( 19 Jul 2019 10:19 )   PT: 13.2 sec;   INR: 1.15 ratio         PTT - ( 19 Jul 2019 10:19 )  PTT:36.6 sec          T(C): 36.6 (07-19-19 @ 21:07), Max: 36.9 (07-19-19 @ 04:05)  HR: 72 (07-19-19 @ 21:07) (56 - 72)  BP: 158/77 (07-19-19 @ 21:07) (139/70 - 158/77)  RR: 18 (07-19-19 @ 21:07) (18 - 18)  SpO2: 98% (07-19-19 @ 21:07) (97% - 98%)  Wt(kg): --    I&O's Summary    18 Jul 2019 07:01  -  19 Jul 2019 07:00  --------------------------------------------------------  IN: 500 mL / OUT: 1000 mL / NET: -500 mL      	  Lymphatic: No lymphadenopathy , no edema  Cardiovascular: Normal S1 S2,RRR, No murmurs , Peripheral pulses palpable 2+ bilaterally  Respiratory: Lungs clear to auscultation, normal effort 	  Gastrointestinal:  Soft, Non-tender, + BS	  Skin: No rashes, No ecchymoses, No cyanosis, warm to touch        TELEMETRY: SR	    ECG:  < from: 12 Lead ECG (07.16.19 @ 18:01) >  Diagnosis Line Normal sinus rhythm  Right bundle branch block  Possible Inferior infarct , age undetermined  Abnormal ECG  When compared with ECG of 15-JUL-2019 06:43,  No significant change was found    < end of copied text >      ASSESSMENT/PLAN: 83 yo M with history of HTN, CKD, UC, Parkinson's disease who is being seen for elevated troponin.    -pt. remains with no chest pain or anginal symptoms    - cardiac markers noted .   -suspect elevated troponin are due to demand ischemia from GIB and also secondary to renal failure  -would check TTE to evaluate LV function  -monitor h/h

## 2019-07-20 NOTE — PROGRESS NOTE ADULT - SUBJECTIVE AND OBJECTIVE BOX
GASTROENTEROLOGY    Patient seen and examined at bedside  No acute events noted      MEDICATIONS  (STANDING):  atorvastatin 80 milliGRAM(s) Oral at bedtime  carbidopa/levodopa  25/100 1 Tablet(s) Oral three times a day  lactated ringers. 500 milliLiter(s) (50 mL/Hr) IV Continuous <Continuous>  latanoprost 0.005% Ophthalmic Solution 1 Drop(s) Both EYES at bedtime  melatonin 3 milliGRAM(s) Oral at bedtime  multivitamin 1 Tablet(s) Oral daily  pantoprazole  Injectable 40 milliGRAM(s) IV Push every 12 hours  sulfaSALAzine 500 milliGRAM(s) Oral two times a day  timolol 0.25% Solution 1 Drop(s) Both EYES two times a day  tobramycin 0.3% Ophthalmic Solution 1 Drop(s) Both EYES two times a day        T(F): 98.4 (07-20-19 @ 11:43), Max: 98.4 (07-20-19 @ 11:43)  HR: 60 (07-20-19 @ 11:43) (60 - 72)  BP: 168/79 (07-20-19 @ 11:43) (142/70 - 168/79)  RR: 16 (07-20-19 @ 11:43) (16 - 18)  SpO2: 99% (07-20-19 @ 11:43) (98% - 99%)  Wt(kg): --    PHYSICAL EXAM  GENERAL:   NAD  HEENT:  NC/AT, no JVD, sclera-anicteric  CHEST:  clear to ascultation bilaterally, respirations nonlabored  HEART:  +S1+S2 regular rate and rhythm   ABDOMEN:  Soft, non-tender, non-distended, normoactive bowel sounds, no masses  EXTREMITIES:  no cyanosis, clubbing or edema  NEURO:  Alert, periods of confusion  SKIN:  No rash/warm/dry      LABS:                        9.5<L>  6.44  )-----------( 236      ( 20 Jul 2019 10:53 )             30.3<L>  20 Jul 2019 10:53    07-20    143  |  108  |  20  ----------------------------<  98  4.1   |  18<L>  |  1.74<H>    Ca    8.6      20 Jul 2019 05:20  Mg     1.8     07-20        PT/INR - ( 19 Jul 2019 10:19 )   PT: 13.2 sec;   INR: 1.15 ratio         PTT - ( 19 Jul 2019 10:19 )  PTT:36.6 sec    < from: CT Virtual Colonoscopy, Screening (07.20.19 @ 11:35) >    FINDINGS:     The entire colon is visualized from the rectum to cecum. Sigmoid   diverticulosis. There is visualization of the ileocecal valve. The   appendix is normal.     No colonic polyp or mass. Small left inguinal hernia containing   non obstructed loop of sigmoid colon.      Additional findings are as follows:  Small right pleural effusion. Mild bibasilar linear atelectasis. Coronary   artery and aortic valvular calcifications. Suggestion of penetrating   ulcer involving the abdominal aorta is unchanged in appearance.    Small bilateral renal cysts. Prostate is enlarged. Atherosclerotic   changes.    IMPRESSION:   No colon mass.    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  -----------------------------------------------------------------------------------------------------      I&O's Detail

## 2019-07-20 NOTE — PROGRESS NOTE ADULT - ASSESSMENT
Problem/Plan - 1:  ·  Problem: GIB (gastrointestinal bleeding).  Plan: SERIAL CBC  GI FU   virtual colonoscopy pending     Problem/Plan - 2:  ·  Problem: Troponin level elevated.  Plan: 2/2 to possible  NSTEMI -  telemonitor  cards fu     Problem/Plan - 3:  ·  Problem: Abnormal CT of the abdomen.  Plan: check virtual colonoscopy     Problem/Plan - 4:·  Problem: Anemia.  Plan: monitor cbc  transfuse PRN      Problem/Plan - 5:  ·  Problem: UC (ulcerative colitis).  Plan: continue home medications  gi following

## 2019-07-20 NOTE — PROGRESS NOTE ADULT - ASSESSMENT
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 Found to have anemia and admitted for hemotransfusion and GI workup .Patient had colonoscopy 3 years ago and [place] and had some polyps removed ,he denies any recent hx of GIB or GI workup ,but admits black stools and lightheadedness ,sob ,worsening last few days .Found to have RANJANA elevation and seen by cardiologist Admitted  to telemetry unit for monitoring , send 3 sets of cardiac ensymes to rule out coronary event, obtain ECHO to evaluate LVEF, cardiology consult ,continue current management, O2 supply, anticoagulation plan as per cardiology  ,aggrenox placed on hold until GI clearance will be obtained Nephrology cons called ,IV hydration is administrated ,lisinopril held as per cardiologist recommendation . (15 Jul 2019 08:57)

## 2019-07-20 NOTE — PROGRESS NOTE ADULT - SUBJECTIVE AND OBJECTIVE BOX
Patient is a 84y Male whom presented to the hospital with ckd and pb    seen in am  PAST MEDICAL & SURGICAL HISTORY:  UC (ulcerative colitis)  HTN (hypertension)  UC (ulcerative colitis)  HTN (hypertension)  Gout  Chronic kidney disease  Acoustic neuroma  No significant past surgical history  No significant past surgical history      MEDICATIONS  (STANDING):  atorvastatin 20 milliGRAM(s) Oral at bedtime  carbidopa/levodopa  25/100 1 Tablet(s) Oral three times a day  clonazePAM  Tablet 0.5 milliGRAM(s) Oral at bedtime  docusate sodium 100 milliGRAM(s) Oral two times a day  lactobacillus acidophilus 1 Tablet(s) Oral daily  latanoprost 0.005% Ophthalmic Solution 1 Drop(s) Both EYES at bedtime  multivitamin 1 Tablet(s) Oral daily  pantoprazole  Injectable 40 milliGRAM(s) IV Push every 12 hours  sodium chloride 0.9%. 1000 milliLiter(s) (75 mL/Hr) IV Continuous <Continuous>  sulfaSALAzine 500 milliGRAM(s) Oral two times a day  timolol 0.25% Solution 1 Drop(s) Both EYES two times a day      Allergies    No Known Allergies    Intolerances        SOCIAL HISTORY:  Denies ETOh,Smoking,     FAMILY HISTORY:      REVIEW OF SYSTEMS:    CONSTITUTIONAL: No weakness, fevers or chills  RESPIRATORY: No cough, wheezing, hemoptysis; pos  shortness of breath  CARDIOVASCULAR: No chest pain or palpitations  GASTROINTESTINAL: No abdominal or epigastric pain. No nausea, vomiting,     No diarrhea or constipation. pos  melena                                                                                         9.5    6.44  )-----------( 236      ( 20 Jul 2019 10:53 )             30.3       CBC Full  -  ( 20 Jul 2019 10:53 )  WBC Count : 6.44 K/uL  RBC Count : 3.02 M/uL  Hemoglobin : 9.5 g/dL  Hematocrit : 30.3 %  Platelet Count - Automated : 236 K/uL  Mean Cell Volume : 100.3 fl  Mean Cell Hemoglobin : 31.5 pg  Mean Cell Hemoglobin Concentration : 31.4 gm/dL  Auto Neutrophil # : x  Auto Lymphocyte # : x  Auto Monocyte # : x  Auto Eosinophil # : x  Auto Basophil # : x  Auto Neutrophil % : x  Auto Lymphocyte % : x  Auto Monocyte % : x  Auto Eosinophil % : x  Auto Basophil % : x      07-20    143  |  108  |  20  ----------------------------<  98  4.1   |  18<L>  |  1.74<H>    Ca    8.6      20 Jul 2019 05:20  Mg     1.8     07-20        CAPILLARY BLOOD GLUCOSE      POCT Blood Glucose.: 102 mg/dL (20 Jul 2019 12:18)      Vital Signs Last 24 Hrs  T(C): 36.9 (20 Jul 2019 11:43), Max: 36.9 (20 Jul 2019 11:43)  T(F): 98.4 (20 Jul 2019 11:43), Max: 98.4 (20 Jul 2019 11:43)  HR: 56 (20 Jul 2019 17:52) (56 - 68)  BP: 140/70 (20 Jul 2019 17:52) (140/70 - 168/79)  BP(mean): --  RR: 16 (20 Jul 2019 11:43) (16 - 18)  SpO2: 99% (20 Jul 2019 11:43) (98% - 99%)        PT/INR - ( 19 Jul 2019 10:19 )   PT: 13.2 sec;   INR: 1.15 ratio         PTT - ( 19 Jul 2019 10:19 )  PTT:36.6 sec    PHYSICAL EXAM:    Constitutional: NAD  HEENT: conjunctive   clear   Neck:  No JVD  Respiratory: decrease bs b/l   Cardiovascular: S1 and S2  Gastrointestinal: BS+, soft, NT/ND  Extremities: trace  peripheral edema  Neurological:  no focal deficits  Psychiatric: normal affect  Skin: dry  Access: Not applicable

## 2019-07-20 NOTE — PROGRESS NOTE ADULT - PROBLEM SELECTOR PLAN 1
Recommendation: CEA is wnl limit  hgb remains stable   cardiology evaluation given NSTEMI  given abnormal CT and hernia containing colon, would first do Virtual colonoscopy  as prior ct findings may be related to under-distention  CT virtual Colonoscopy 7/20 showed Sigmoid diverticulosis. No colonic polyp or mass. Small left inguinal hernia containing non obstructed loop of sigmoid colon.    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

## 2019-07-21 LAB
ANION GAP SERPL CALC-SCNC: 19 MMOL/L — HIGH (ref 5–17)
BUN SERPL-MCNC: 18 MG/DL — SIGNIFICANT CHANGE UP (ref 7–23)
CALCIUM SERPL-MCNC: 9.5 MG/DL — SIGNIFICANT CHANGE UP (ref 8.4–10.5)
CHLORIDE SERPL-SCNC: 107 MMOL/L — SIGNIFICANT CHANGE UP (ref 96–108)
CO2 SERPL-SCNC: 16 MMOL/L — LOW (ref 22–31)
CREAT SERPL-MCNC: 1.77 MG/DL — HIGH (ref 0.5–1.3)
GLUCOSE SERPL-MCNC: 93 MG/DL — SIGNIFICANT CHANGE UP (ref 70–99)
HCT VFR BLD CALC: 33.7 % — LOW (ref 39–50)
HGB BLD-MCNC: 10.4 G/DL — LOW (ref 13–17)
MAGNESIUM SERPL-MCNC: 2 MG/DL — SIGNIFICANT CHANGE UP (ref 1.6–2.6)
MCHC RBC-ENTMCNC: 30.8 PG — SIGNIFICANT CHANGE UP (ref 27–34)
MCHC RBC-ENTMCNC: 30.9 GM/DL — LOW (ref 32–36)
MCV RBC AUTO: 99.7 FL — SIGNIFICANT CHANGE UP (ref 80–100)
PLATELET # BLD AUTO: 291 K/UL — SIGNIFICANT CHANGE UP (ref 150–400)
POTASSIUM SERPL-MCNC: 4.1 MMOL/L — SIGNIFICANT CHANGE UP (ref 3.5–5.3)
POTASSIUM SERPL-SCNC: 4.1 MMOL/L — SIGNIFICANT CHANGE UP (ref 3.5–5.3)
RBC # BLD: 3.38 M/UL — LOW (ref 4.2–5.8)
RBC # FLD: 15.3 % — HIGH (ref 10.3–14.5)
SODIUM SERPL-SCNC: 142 MMOL/L — SIGNIFICANT CHANGE UP (ref 135–145)
WBC # BLD: 7.02 K/UL — SIGNIFICANT CHANGE UP (ref 3.8–10.5)
WBC # FLD AUTO: 7.02 K/UL — SIGNIFICANT CHANGE UP (ref 3.8–10.5)

## 2019-07-21 RX ORDER — CLONAZEPAM 1 MG
0.5 TABLET ORAL AT BEDTIME
Refills: 0 | Status: DISCONTINUED | OUTPATIENT
Start: 2019-07-21 | End: 2019-07-24

## 2019-07-21 RX ADMIN — Medication 500 MILLIGRAM(S): at 05:33

## 2019-07-21 RX ADMIN — Medication 1 DROP(S): at 05:40

## 2019-07-21 RX ADMIN — PANTOPRAZOLE SODIUM 40 MILLIGRAM(S): 20 TABLET, DELAYED RELEASE ORAL at 05:40

## 2019-07-21 RX ADMIN — Medication 1 DROP(S): at 17:13

## 2019-07-21 RX ADMIN — Medication 1 TABLET(S): at 12:17

## 2019-07-21 RX ADMIN — Medication 500 MILLIGRAM(S): at 17:54

## 2019-07-21 RX ADMIN — PANTOPRAZOLE SODIUM 40 MILLIGRAM(S): 20 TABLET, DELAYED RELEASE ORAL at 17:54

## 2019-07-21 RX ADMIN — CARBIDOPA AND LEVODOPA 1 TABLET(S): 25; 100 TABLET ORAL at 13:53

## 2019-07-21 RX ADMIN — Medication 1 DROP(S): at 17:54

## 2019-07-21 RX ADMIN — CARBIDOPA AND LEVODOPA 1 TABLET(S): 25; 100 TABLET ORAL at 05:33

## 2019-07-21 RX ADMIN — LATANOPROST 1 DROP(S): 0.05 SOLUTION/ DROPS OPHTHALMIC; TOPICAL at 23:58

## 2019-07-21 RX ADMIN — CARBIDOPA AND LEVODOPA 1 TABLET(S): 25; 100 TABLET ORAL at 21:58

## 2019-07-21 NOTE — PROGRESS NOTE ADULT - SUBJECTIVE AND OBJECTIVE BOX
Patient is a 84y Male whom presented to the hospital with ckd and pb    pt seen  and examined  in am  , nad , no fever   PAST MEDICAL & SURGICAL HISTORY:  UC (ulcerative colitis)  HTN (hypertension)  UC (ulcerative colitis)  HTN (hypertension)  Gout  Chronic kidney disease  Acoustic neuroma  No significant past surgical history  No significant past surgical history      MEDICATIONS  (STANDING):  atorvastatin 20 milliGRAM(s) Oral at bedtime  carbidopa/levodopa  25/100 1 Tablet(s) Oral three times a day  clonazePAM  Tablet 0.5 milliGRAM(s) Oral at bedtime  docusate sodium 100 milliGRAM(s) Oral two times a day  lactobacillus acidophilus 1 Tablet(s) Oral daily  latanoprost 0.005% Ophthalmic Solution 1 Drop(s) Both EYES at bedtime  multivitamin 1 Tablet(s) Oral daily  pantoprazole  Injectable 40 milliGRAM(s) IV Push every 12 hours  sodium chloride 0.9%. 1000 milliLiter(s) (75 mL/Hr) IV Continuous <Continuous>  sulfaSALAzine 500 milliGRAM(s) Oral two times a day  timolol 0.25% Solution 1 Drop(s) Both EYES two times a day      Allergies    No Known Allergies    Intolerances        SOCIAL HISTORY:  Denies ETOh,Smoking,     FAMILY HISTORY:      REVIEW OF SYSTEMS:    CONSTITUTIONAL: No weakness, fevers or chills  RESPIRATORY: No cough, wheezing, hemoptysis; pos  shortness of breath  CARDIOVASCULAR: No chest pain or palpitations  GASTROINTESTINAL: No abdominal or epigastric pain. No nausea, vomiting,     No diarrhea or constipation. pos  melena                                              10.4   7.02  )-----------( 291      ( 21 Jul 2019 10:25 )             33.7       CBC Full  -  ( 21 Jul 2019 10:25 )  WBC Count : 7.02 K/uL  RBC Count : 3.38 M/uL  Hemoglobin : 10.4 g/dL  Hematocrit : 33.7 %  Platelet Count - Automated : 291 K/uL  Mean Cell Volume : 99.7 fl  Mean Cell Hemoglobin : 30.8 pg  Mean Cell Hemoglobin Concentration : 30.9 gm/dL  Auto Neutrophil # : x  Auto Lymphocyte # : x  Auto Monocyte # : x  Auto Eosinophil # : x  Auto Basophil # : x  Auto Neutrophil % : x  Auto Lymphocyte % : x  Auto Monocyte % : x  Auto Eosinophil % : x  Auto Basophil % : x      07-21    142  |  107  |  18  ----------------------------<  93  4.1   |  16<L>  |  1.77<H>    Ca    9.5      21 Jul 2019 06:04  Mg     2.0     07-21        CAPILLARY BLOOD GLUCOSE          Vital Signs Last 24 Hrs  T(C): 36.2 (21 Jul 2019 11:31), Max: 36.9 (21 Jul 2019 04:21)  T(F): 97.2 (21 Jul 2019 11:31), Max: 98.4 (21 Jul 2019 04:21)  HR: 82 (21 Jul 2019 11:31) (56 - 82)  BP: 159/80 (21 Jul 2019 11:31) (140/70 - 159/80)  BP(mean): --  RR: 18 (21 Jul 2019 11:31) (17 - 18)  SpO2: 99% (21 Jul 2019 11:31) (98% - 99%)                                                                            PHYSICAL EXAM:    Constitutional: NAD  HEENT: conjunctive   clear   Neck:  No JVD  Respiratory: decrease bs b/l   Cardiovascular: S1 and S2  Gastrointestinal: BS+, soft, NT/ND  Extremities: trace  peripheral edema  Neurological:  no focal deficits  Psychiatric: normal affect  Skin: dry  Access: Not applicable

## 2019-07-21 NOTE — PROGRESS NOTE ADULT - SUBJECTIVE AND OBJECTIVE BOX
Patient is a 84y old  Male who presents with a chief complaint of SOB , anemia ,black stools (21 Jul 2019 13:12)      INTERVAL HPI/OVERNIGHT EVENTS:  T(C): 36.2 (07-21-19 @ 11:31), Max: 36.9 (07-21-19 @ 04:21)  HR: 82 (07-21-19 @ 11:31) (56 - 82)  BP: 159/80 (07-21-19 @ 11:31) (140/70 - 159/80)  RR: 18 (07-21-19 @ 11:31) (17 - 18)  SpO2: 99% (07-21-19 @ 11:31) (98% - 99%)  Wt(kg): --  I&O's Summary    20 Jul 2019 07:01  -  21 Jul 2019 07:00  --------------------------------------------------------  IN: 200 mL / OUT: 250 mL / NET: -50 mL    21 Jul 2019 07:01  -  21 Jul 2019 17:22  --------------------------------------------------------  IN: 360 mL / OUT: 0 mL / NET: 360 mL        LABS:                        10.4   7.02  )-----------( 291      ( 21 Jul 2019 10:25 )             33.7     07-21    142  |  107  |  18  ----------------------------<  93  4.1   |  16<L>  |  1.77<H>    Ca    9.5      21 Jul 2019 06:04  Mg     2.0     07-21          CAPILLARY BLOOD GLUCOSE                MEDICATIONS  (STANDING):  atorvastatin 80 milliGRAM(s) Oral at bedtime  carbidopa/levodopa  25/100 1 Tablet(s) Oral three times a day  lactated ringers. 500 milliLiter(s) (50 mL/Hr) IV Continuous <Continuous>  latanoprost 0.005% Ophthalmic Solution 1 Drop(s) Both EYES at bedtime  melatonin 3 milliGRAM(s) Oral at bedtime  multivitamin 1 Tablet(s) Oral daily  pantoprazole  Injectable 40 milliGRAM(s) IV Push every 12 hours  sulfaSALAzine 500 milliGRAM(s) Oral two times a day  timolol 0.25% Solution 1 Drop(s) Both EYES two times a day  tobramycin 0.3% Ophthalmic Solution 1 Drop(s) Both EYES two times a day    MEDICATIONS  (PRN):  acetaminophen   Tablet .. 650 milliGRAM(s) Oral every 6 hours PRN Temp greater or equal to 38C (100.4F), Mild Pain (1 - 3), Moderate Pain (4 - 6)          PHYSICAL EXAM:  GENERAL: NAD, well-groomed, well-developed  HEAD:  Atraumatic, Normocephalic  CHEST/LUNG: Clear to percussion bilaterally; No rales, rhonchi, wheezing, or rubs  HEART: Regular rate and rhythm; No murmurs, rubs, or gallops  ABDOMEN: Soft, Nontender, Nondistended; Bowel sounds present  EXTREMITIES:  2+ Peripheral Pulses, No clubbing, cyanosis, or edema  LYMPH: No lymphadenopathy noted  SKIN: No rashes or lesions    Care Discussed with Consultants/Other Providers [ ] YES  [ ] NO

## 2019-07-21 NOTE — PROGRESS NOTE ADULT - ASSESSMENT
Problem/Plan - 1:  ·  Problem: GIB (gastrointestinal bleeding).  Plan: SERIAL CBC  GI FU   virtual colonoscopy reviewed    Problem/Plan - 2:  ·  Problem: Troponin level elevated.  Plan: 2/2 to possible  NSTEMI -  telemonitor  cards fu     Problem/Plan - 3:  ·  Problem: Abnormal CT of the abdomen.  Plan:  virtual colonoscopy with no colonic mass     Problem/Plan - 4:·  Problem: Anemia.  Plan: monitor cbc  transfuse PRN      Problem/Plan - 5:  ·  Problem: UC (ulcerative colitis).  Plan: continue home medications  gi following

## 2019-07-21 NOTE — PROGRESS NOTE ADULT - SUBJECTIVE AND OBJECTIVE BOX
GASTROENTEROLOGY    Patient seen and examined at bedside  No acute events noted  Some dark stools per RN report    MEDICATIONS  (STANDING):  atorvastatin 80 milliGRAM(s) Oral at bedtime  carbidopa/levodopa  25/100 1 Tablet(s) Oral three times a day  lactated ringers. 500 milliLiter(s) (50 mL/Hr) IV Continuous <Continuous>  latanoprost 0.005% Ophthalmic Solution 1 Drop(s) Both EYES at bedtime  melatonin 3 milliGRAM(s) Oral at bedtime  multivitamin 1 Tablet(s) Oral daily  pantoprazole  Injectable 40 milliGRAM(s) IV Push every 12 hours  sulfaSALAzine 500 milliGRAM(s) Oral two times a day  timolol 0.25% Solution 1 Drop(s) Both EYES two times a day  tobramycin 0.3% Ophthalmic Solution 1 Drop(s) Both EYES two times a day        T(F): 97.2 (07-21-19 @ 11:31), Max: 98.4 (07-21-19 @ 04:21)  HR: 82 (07-21-19 @ 11:31) (56 - 82)  BP: 159/80 (07-21-19 @ 11:31) (140/70 - 159/80)  RR: 18 (07-21-19 @ 11:31) (17 - 18)  SpO2: 99% (07-21-19 @ 11:31) (98% - 99%)  Wt(kg): --    PHYSICAL EXAM  GENERAL:   NAD  HEENT:  NC/AT, no JVD, sclera-anicteric  CHEST:  clear to ascultation bilaterally, respirations nonlabored  HEART:  +S1+S2   ABDOMEN:  Soft, non-tender, non-distended, normoactive bowel sounds  EXTREMITIES:  no cyanosis, clubbing or edema  NEURO:  Alert, confused  SKIN:  No rash/warm/dry      LABS:                        10.4<L>  7.02  )-----------( 291      ( 21 Jul 2019 10:25 )             33.7<L>  21 Jul 2019 10:25    07-21    142  |  107  |  18  ----------------------------<  93  4.1   |  16<L>  |  1.77<H>    Ca    9.5      21 Jul 2019 06:04  Mg     2.0     07-21          I&O's Detail    20 Jul 2019 07:01  -  21 Jul 2019 07:00  --------------------------------------------------------  IN:    Oral Fluid: 200 mL  Total IN: 200 mL    OUT:    Voided: 250 mL  Total OUT: 250 mL    Total NET: -50 mL

## 2019-07-21 NOTE — PROGRESS NOTE ADULT - PROBLEM SELECTOR PLAN 1
Recommendation: CEA is wnl limit  hgb remains stable   cardiology evaluation given NSTEMI  given abnormal CT and hernia containing colon, would first do Virtual colonoscopy  as prior ct findings may be related to under-distention  CT virtual Colonoscopy 7/20 showed Sigmoid diverticulosis. No colonic polyp or mass. Small left inguinal hernia containing non obstructed loop of sigmoid colon.    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

## 2019-07-21 NOTE — PROGRESS NOTE ADULT - ASSESSMENT
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 Found to have anemia and admitted for hemotransfusion and GI workup .Patient had colonoscopy 3 years ago and [place] and had some polyps removed ,he denies any recent hx of GIB or GI workup ,but admits black stools and lightheadedness ,sob ,worsening last few days .Found to have RANJANA elevation and seen by cardiologist Admitted  to telemetry unit for monitoring , send 3 sets of cardiac ensymes to rule out coronary event, obtain ECHO to evaluate LVEF, cardiology consult ,continue current management, O2 supply, anticoagulation plan as per cardiology  ,aggrenox placed on hold until GI clearance will be obtained Nephrology cons called ,IV hydration is administrated ,lisinopril held as per cardiologist recommendation . (15 Jul 2019 08:57)

## 2019-07-21 NOTE — PROGRESS NOTE ADULT - PROBLEM SELECTOR PLAN 1
CHRONIC KIDNEY DISEASE, STAGE 3: increase water intake , continue with iv fluid   Serum creatinine is stable at improved 1.77, approximating a GFR of is decreased  ml/min.   There is no progression.  No uremic symptoms. No evidence of  worsening  Anemia. Fluid status stable.   Will continue to avoid nephrotoxic drugs.  Patient remains asymptomatic.  Continue current therapy.

## 2019-07-21 NOTE — PROGRESS NOTE ADULT - SUBJECTIVE AND OBJECTIVE BOX
S: no chest pain or sob     acetaminophen   Tablet .. 650 milliGRAM(s) Oral every 6 hours PRN  atorvastatin 80 milliGRAM(s) Oral at bedtime  carbidopa/levodopa  25/100 1 Tablet(s) Oral three times a day  lactated ringers. 500 milliLiter(s) IV Continuous <Continuous>  latanoprost 0.005% Ophthalmic Solution 1 Drop(s) Both EYES at bedtime  melatonin 3 milliGRAM(s) Oral at bedtime  multivitamin 1 Tablet(s) Oral daily  pantoprazole  Injectable 40 milliGRAM(s) IV Push every 12 hours  sulfaSALAzine 500 milliGRAM(s) Oral two times a day  timolol 0.25% Solution 1 Drop(s) Both EYES two times a day  tobramycin 0.3% Ophthalmic Solution 1 Drop(s) Both EYES two times a day                            10.4   7.02  )-----------( 291      ( 21 Jul 2019 10:25 )             33.7       07-21    142  |  107  |  18  ----------------------------<  93  4.1   |  16<L>  |  1.77<H>    Ca    9.5      21 Jul 2019 06:04  Mg     2.0     07-21              T(C): 36.2 (07-21-19 @ 11:31), Max: 36.9 (07-21-19 @ 04:21)  HR: 82 (07-21-19 @ 11:31) (56 - 82)  BP: 159/80 (07-21-19 @ 11:31) (140/70 - 159/80)  RR: 18 (07-21-19 @ 11:31) (17 - 18)  SpO2: 99% (07-21-19 @ 11:31) (98% - 99%)  Wt(kg): --    I&O's Summary    20 Jul 2019 07:01  -  21 Jul 2019 07:00  --------------------------------------------------------  IN: 200 mL / OUT: 250 mL / NET: -50 mL      	  Lymphatic: No lymphadenopathy , no edema  Cardiovascular: Normal S1 S2,RRR, No murmurs , Peripheral pulses palpable 2+ bilaterally  Respiratory: Lungs clear to auscultation, normal effort 	  Gastrointestinal:  Soft, Non-tender, + BS	  Skin: No rashes, No ecchymoses, No cyanosis, warm to touch        TELEMETRY: SR	    ECG:  < from: 12 Lead ECG (07.16.19 @ 18:01) >  Diagnosis Line Normal sinus rhythm  Right bundle branch block  Possible Inferior infarct , age undetermined  Abnormal ECG  When compared with ECG of 15-JUL-2019 06:43,  No significant change was found    < end of copied text >      ASSESSMENT/PLAN: 83 yo M with history of HTN, CKD, UC, Parkinson's disease who is being seen for elevated troponin.    - pt. with no chest pain or anginal symptoms    - cardiac markers noted .   - suspect elevated troponin are due to demand ischemia from GIB and also secondary to renal failure  - would check TTE to evaluate LV function  - monitor h/h  - follow up GI    Herson Page MD

## 2019-07-22 LAB
ANION GAP SERPL CALC-SCNC: 18 MMOL/L — HIGH (ref 5–17)
BUN SERPL-MCNC: 17 MG/DL — SIGNIFICANT CHANGE UP (ref 7–23)
CALCIUM SERPL-MCNC: 8.8 MG/DL — SIGNIFICANT CHANGE UP (ref 8.4–10.5)
CHLORIDE SERPL-SCNC: 104 MMOL/L — SIGNIFICANT CHANGE UP (ref 96–108)
CO2 SERPL-SCNC: 19 MMOL/L — LOW (ref 22–31)
CREAT SERPL-MCNC: 1.09 MG/DL — SIGNIFICANT CHANGE UP (ref 0.5–1.3)
GLUCOSE SERPL-MCNC: 126 MG/DL — HIGH (ref 70–99)
HCT VFR BLD CALC: 31.1 % — LOW (ref 39–50)
HCT VFR BLD CALC: 31.7 % — LOW (ref 39–50)
HGB BLD-MCNC: 10.5 G/DL — LOW (ref 13–17)
HGB BLD-MCNC: 9.9 G/DL — LOW (ref 13–17)
MCHC RBC-ENTMCNC: 31.1 PG — SIGNIFICANT CHANGE UP (ref 27–34)
MCHC RBC-ENTMCNC: 31.8 GM/DL — LOW (ref 32–36)
MCHC RBC-ENTMCNC: 31.8 PG — SIGNIFICANT CHANGE UP (ref 27–34)
MCHC RBC-ENTMCNC: 33.2 GM/DL — SIGNIFICANT CHANGE UP (ref 32–36)
MCV RBC AUTO: 96 FL — SIGNIFICANT CHANGE UP (ref 80–100)
MCV RBC AUTO: 97.8 FL — SIGNIFICANT CHANGE UP (ref 80–100)
PLATELET # BLD AUTO: 271 K/UL — SIGNIFICANT CHANGE UP (ref 150–400)
PLATELET # BLD AUTO: 288 K/UL — SIGNIFICANT CHANGE UP (ref 150–400)
POTASSIUM SERPL-MCNC: 4.2 MMOL/L — SIGNIFICANT CHANGE UP (ref 3.5–5.3)
POTASSIUM SERPL-SCNC: 4.2 MMOL/L — SIGNIFICANT CHANGE UP (ref 3.5–5.3)
RBC # BLD: 3.18 M/UL — LOW (ref 4.2–5.8)
RBC # BLD: 3.31 M/UL — LOW (ref 4.2–5.8)
RBC # FLD: 14.1 % — SIGNIFICANT CHANGE UP (ref 10.3–14.5)
RBC # FLD: 14.6 % — HIGH (ref 10.3–14.5)
SODIUM SERPL-SCNC: 141 MMOL/L — SIGNIFICANT CHANGE UP (ref 135–145)
WBC # BLD: 6.7 K/UL — SIGNIFICANT CHANGE UP (ref 3.8–10.5)
WBC # BLD: 7.19 K/UL — SIGNIFICANT CHANGE UP (ref 3.8–10.5)
WBC # FLD AUTO: 6.7 K/UL — SIGNIFICANT CHANGE UP (ref 3.8–10.5)
WBC # FLD AUTO: 7.19 K/UL — SIGNIFICANT CHANGE UP (ref 3.8–10.5)

## 2019-07-22 RX ADMIN — CARBIDOPA AND LEVODOPA 1 TABLET(S): 25; 100 TABLET ORAL at 21:00

## 2019-07-22 RX ADMIN — Medication 1 DROP(S): at 18:03

## 2019-07-22 RX ADMIN — Medication 500 MILLIGRAM(S): at 18:03

## 2019-07-22 RX ADMIN — ATORVASTATIN CALCIUM 80 MILLIGRAM(S): 80 TABLET, FILM COATED ORAL at 21:00

## 2019-07-22 RX ADMIN — Medication 1 DROP(S): at 06:03

## 2019-07-22 RX ADMIN — Medication 3 MILLIGRAM(S): at 21:00

## 2019-07-22 RX ADMIN — CARBIDOPA AND LEVODOPA 1 TABLET(S): 25; 100 TABLET ORAL at 06:03

## 2019-07-22 RX ADMIN — CARBIDOPA AND LEVODOPA 1 TABLET(S): 25; 100 TABLET ORAL at 13:17

## 2019-07-22 RX ADMIN — PANTOPRAZOLE SODIUM 40 MILLIGRAM(S): 20 TABLET, DELAYED RELEASE ORAL at 18:03

## 2019-07-22 RX ADMIN — Medication 500 MILLIGRAM(S): at 06:03

## 2019-07-22 RX ADMIN — LATANOPROST 1 DROP(S): 0.05 SOLUTION/ DROPS OPHTHALMIC; TOPICAL at 21:04

## 2019-07-22 RX ADMIN — Medication 1 TABLET(S): at 13:17

## 2019-07-22 NOTE — PROGRESS NOTE ADULT - SUBJECTIVE AND OBJECTIVE BOX
GASTROENTEROLOGY    Patient seen and examined at bedside  family at bedside     MEDICATIONS  (STANDING):  atorvastatin 80 milliGRAM(s) Oral at bedtime  carbidopa/levodopa  25/100 1 Tablet(s) Oral three times a day  lactated ringers. 500 milliLiter(s) (50 mL/Hr) IV Continuous <Continuous>  latanoprost 0.005% Ophthalmic Solution 1 Drop(s) Both EYES at bedtime  melatonin 3 milliGRAM(s) Oral at bedtime  multivitamin 1 Tablet(s) Oral daily  pantoprazole  Injectable 40 milliGRAM(s) IV Push every 12 hours  sulfaSALAzine 500 milliGRAM(s) Oral two times a day  timolol 0.25% Solution 1 Drop(s) Both EYES two times a day  tobramycin 0.3% Ophthalmic Solution 1 Drop(s) Both EYES two times a day        T(F): 97.2 (07-21-19 @ 11:31), Max: 98.4 (07-21-19 @ 04:21)  HR: 82 (07-21-19 @ 11:31) (56 - 82)  BP: 159/80 (07-21-19 @ 11:31) (140/70 - 159/80)  RR: 18 (07-21-19 @ 11:31) (17 - 18)  SpO2: 99% (07-21-19 @ 11:31) (98% - 99%)  Wt(kg): --    PHYSICAL EXAM  GENERAL:   NAD  HEENT:  NC/AT, no JVD, sclera-anicteric  CHEST:  clear to ascultation bilaterally, respirations nonlabored  HEART:  +S1+S2   ABDOMEN:  Soft, non-tender, non-distended, normoactive bowel sounds  EXTREMITIES:  no cyanosis, clubbing or edema  NEURO:  Alert, confused  SKIN:  No rash/warm/dry      LABS:                        10.4<L>  7.02  )-----------( 291      ( 21 Jul 2019 10:25 )             33.7<L>  21 Jul 2019 10:25    07-21    142  |  107  |  18  ----------------------------<  93  4.1   |  16<L>  |  1.77<H>    Ca    9.5      21 Jul 2019 06:04  Mg     2.0     07-21          I&O's Detail    20 Jul 2019 07:01  -  21 Jul 2019 07:00  --------------------------------------------------------  IN:    Oral Fluid: 200 mL  Total IN: 200 mL    OUT:    Voided: 250 mL  Total OUT: 250 mL    Total NET: -50 mL

## 2019-07-22 NOTE — PROGRESS NOTE ADULT - SUBJECTIVE AND OBJECTIVE BOX
S: Denies chest pain or sob       MEDICATIONS  (STANDING):  atorvastatin 80 milliGRAM(s) Oral at bedtime  carbidopa/levodopa  25/100 1 Tablet(s) Oral three times a day  lactated ringers. 500 milliLiter(s) (50 mL/Hr) IV Continuous <Continuous>  latanoprost 0.005% Ophthalmic Solution 1 Drop(s) Both EYES at bedtime  melatonin 3 milliGRAM(s) Oral at bedtime  multivitamin 1 Tablet(s) Oral daily  pantoprazole  Injectable 40 milliGRAM(s) IV Push every 12 hours  sulfaSALAzine 500 milliGRAM(s) Oral two times a day  timolol 0.25% Solution 1 Drop(s) Both EYES two times a day  tobramycin 0.3% Ophthalmic Solution 1 Drop(s) Both EYES two times a day    MEDICATIONS  (PRN):  acetaminophen   Tablet .. 650 milliGRAM(s) Oral every 6 hours PRN Temp greater or equal to 38C (100.4F), Mild Pain (1 - 3), Moderate Pain (4 - 6)  clonazePAM  Tablet 0.5 milliGRAM(s) Oral at bedtime PRN anxiety      LABS:                            9.9    7.19  )-----------( 271      ( 22 Jul 2019 08:42 )             31.1     Hemoglobin: 9.9 g/dL (07-22 @ 08:42)  Hemoglobin: 10.4 g/dL (07-21 @ 10:25)  Hemoglobin: 9.5 g/dL (07-20 @ 10:53)  Hemoglobin: 9.7 g/dL (07-19 @ 10:19)  Hemoglobin: 9.4 g/dL (07-18 @ 06:31)    07-22    141  |  104  |  17  ----------------------------<  126<H>  4.2   |  19<L>  |  1.09    Ca    8.8      22 Jul 2019 06:30  Mg     2.0     07-21      Creatinine Trend: 1.09<--, 1.77<--, 1.74<--, 1.72<--, 1.76<--, 1.90<--           PHYSICAL EXAM  Vital Signs Last 24 Hrs  T(C): 36.9 (22 Jul 2019 11:21), Max: 36.9 (22 Jul 2019 11:21)  T(F): 98.4 (22 Jul 2019 11:21), Max: 98.4 (22 Jul 2019 11:21)  HR: 78 (22 Jul 2019 11:21) (74 - 78)  BP: 131/67 (22 Jul 2019 11:21) (131/67 - 176/79)  BP(mean): --  RR: 18 (22 Jul 2019 11:21) (18 - 18)  SpO2: 99% (22 Jul 2019 11:21) (98% - 99%)      Lymphatic: No lymphadenopathy , no edema  Cardiovascular: Normal S1 S2,RRR, No murmurs , Peripheral pulses palpable 2+ bilaterally  Respiratory: Lungs clear to auscultation, normal effort 	  Gastrointestinal:  Soft, Non-tender, + BS	  Skin: No rashes, No ecchymoses, No cyanosis, warm to touch    TELEMETRY: SB/SR 50-80	      ECG:  < from: 12 Lead ECG (07.16.19 @ 18:01) >  Diagnosis Line Normal sinus rhythm  Right bundle branch block  Possible Inferior infarct , age undetermined  Abnormal ECG  When compared with ECG of 15-JUL-2019 06:43,  No significant change was found    < end of copied text >      ASSESSMENT/PLAN: 83 yo M with history of HTN, CKD, UC, Parkinson's disease who is being seen for elevated troponin.    - Patient with no chest pain or anginal symptoms    - cardiac markers noted  - suspect elevated troponin are due to demand ischemia from GIB and renal disease  - Pending TTE to evaluate LV function  - F/u GI - monitor h/h  - Further cardiac w/u pending above    Arnoldo Birch PA-C  [facility]  Pager: [phone]

## 2019-07-22 NOTE — PROGRESS NOTE ADULT - SUBJECTIVE AND OBJECTIVE BOX
Patient is a 84y old  Male who presents with a chief complaint of SOB , anemia ,black stools (21 Jul 2019 17:22)      SUBJECTIVE / OVERNIGHT EVENTS:  No chest pain. No shortness of breath. No complaints. No events overnight.     MEDICATIONS  (STANDING):  atorvastatin 80 milliGRAM(s) Oral at bedtime  carbidopa/levodopa  25/100 1 Tablet(s) Oral three times a day  lactated ringers. 500 milliLiter(s) (50 mL/Hr) IV Continuous <Continuous>  latanoprost 0.005% Ophthalmic Solution 1 Drop(s) Both EYES at bedtime  melatonin 3 milliGRAM(s) Oral at bedtime  multivitamin 1 Tablet(s) Oral daily  pantoprazole  Injectable 40 milliGRAM(s) IV Push every 12 hours  sulfaSALAzine 500 milliGRAM(s) Oral two times a day  timolol 0.25% Solution 1 Drop(s) Both EYES two times a day  tobramycin 0.3% Ophthalmic Solution 1 Drop(s) Both EYES two times a day    MEDICATIONS  (PRN):  acetaminophen   Tablet .. 650 milliGRAM(s) Oral every 6 hours PRN Temp greater or equal to 38C (100.4F), Mild Pain (1 - 3), Moderate Pain (4 - 6)  clonazePAM  Tablet 0.5 milliGRAM(s) Oral at bedtime PRN anxiety      Vital Signs Last 24 Hrs  T(C): 36.9 (22 Jul 2019 11:21), Max: 36.9 (22 Jul 2019 11:21)  T(F): 98.4 (22 Jul 2019 11:21), Max: 98.4 (22 Jul 2019 11:21)  HR: 78 (22 Jul 2019 11:21) (74 - 78)  BP: 131/67 (22 Jul 2019 11:21) (131/67 - 176/79)  BP(mean): --  RR: 18 (22 Jul 2019 11:21) (18 - 18)  SpO2: 99% (22 Jul 2019 11:21) (98% - 99%)  CAPILLARY BLOOD GLUCOSE        I&O's Summary    21 Jul 2019 07:01  -  22 Jul 2019 07:00  --------------------------------------------------------  IN: 360 mL / OUT: 475 mL / NET: -115 mL    22 Jul 2019 07:01  -  22 Jul 2019 15:23  --------------------------------------------------------  IN: 220 mL / OUT: 0 mL / NET: 220 mL        PHYSICAL EXAM:  GENERAL: NAD, well-developed  HEAD:  Atraumatic, Normocephalic  EYES: EOMI, PERRLA, conjunctiva and sclera clear  NECK: Supple, No JVD  CHEST/LUNG: Clear to auscultation bilaterally; No wheeze  HEART: Regular rate and rhythm; No murmurs, rubs, or gallops  ABDOMEN: Soft, Nontender, Nondistended; Bowel sounds present  EXTREMITIES:  2+ Peripheral Pulses, No clubbing, cyanosis, or edema  PSYCH: AAOx3  NEUROLOGY: non-focal  SKIN: No rashes or lesions    LABS:                        9.9    7.19  )-----------( 271      ( 22 Jul 2019 08:42 )             31.1     07-22    141  |  104  |  17  ----------------------------<  126<H>  4.2   |  19<L>  |  1.09    Ca    8.8      22 Jul 2019 06:30  Mg     2.0     07-21                RADIOLOGY & ADDITIONAL TESTS:    Imaging Personally Reviewed:    < from: CT Virtual Colonoscopy, Screening (07.20.19 @ 11:35) >  FINDINGS:     The entire colon is visualized from the rectum to cecum. Sigmoid   diverticulosis. There is visualization of the ileocecal valve. The   appendix is normal.     No colonic polyp or mass. Small left inguinal hernia containing   nonobstructed loop of sigmoid colon.      Additional findings are as follows:  Small right pleural effusion. Mild bibasilar linear atelectasis. Coronary   artery and aortic valvular calcifications. Suggestion ofpenetrating   ulcer involving the abdominal aorta is unchanged in appearance.    Small bilateral renal cysts. Prostate is enlarged. Atherosclerotic   changes.    IMPRESSION:   No colon mass.    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            < end of copied text >    Consultant(s) Notes Reviewed:      Care Discussed with Consultants/Other Providers:

## 2019-07-22 NOTE — PROGRESS NOTE ADULT - PROBLEM SELECTOR PLAN 1
virtual colon is negative  no further gi bleed  hgb stable  long discussion with family, decision made to forgo  upper gastrointestinal endoscopy at this time given improvement with proton pump inhibitor and holding a/c   Advance diet as tolerated  dc planning

## 2019-07-22 NOTE — PROGRESS NOTE ADULT - SUBJECTIVE AND OBJECTIVE BOX
Patient is a 84y Male whom presented to the hospital with ckd and pb    pt seen  and examined  in am  , nad , no fever   PAST MEDICAL & SURGICAL HISTORY:  UC (ulcerative colitis)  HTN (hypertension)  UC (ulcerative colitis)  HTN (hypertension)  Gout  Chronic kidney disease  Acoustic neuroma  No significant past surgical history  No significant past surgical history      MEDICATIONS  (STANDING):  atorvastatin 20 milliGRAM(s) Oral at bedtime  carbidopa/levodopa  25/100 1 Tablet(s) Oral three times a day  clonazePAM  Tablet 0.5 milliGRAM(s) Oral at bedtime  docusate sodium 100 milliGRAM(s) Oral two times a day  lactobacillus acidophilus 1 Tablet(s) Oral daily  latanoprost 0.005% Ophthalmic Solution 1 Drop(s) Both EYES at bedtime  multivitamin 1 Tablet(s) Oral daily  pantoprazole  Injectable 40 milliGRAM(s) IV Push every 12 hours  sodium chloride 0.9%. 1000 milliLiter(s) (75 mL/Hr) IV Continuous <Continuous>  sulfaSALAzine 500 milliGRAM(s) Oral two times a day  timolol 0.25% Solution 1 Drop(s) Both EYES two times a day      Allergies    No Known Allergies    Intolerances        SOCIAL HISTORY:  Denies ETOh,Smoking,     FAMILY HISTORY:      REVIEW OF SYSTEMS:    CONSTITUTIONAL: No weakness, fevers or chills  RESPIRATORY: No cough, wheezing, hemoptysis; pos  shortness of breath  CARDIOVASCULAR: No chest pain or palpitations  GASTROINTESTINAL: No abdominal or epigastric pain. No nausea, vomiting,     No diarrhea or constipation. pos  melena                                                                9.9    7.19  )-----------( 271      ( 22 Jul 2019 08:42 )             31.1       CBC Full  -  ( 22 Jul 2019 08:42 )  WBC Count : 7.19 K/uL  RBC Count : 3.18 M/uL  Hemoglobin : 9.9 g/dL  Hematocrit : 31.1 %  Platelet Count - Automated : 271 K/uL  Mean Cell Volume : 97.8 fl  Mean Cell Hemoglobin : 31.1 pg  Mean Cell Hemoglobin Concentration : 31.8 gm/dL  Auto Neutrophil # : x  Auto Lymphocyte # : x  Auto Monocyte # : x  Auto Eosinophil # : x  Auto Basophil # : x  Auto Neutrophil % : x  Auto Lymphocyte % : x  Auto Monocyte % : x  Auto Eosinophil % : x  Auto Basophil % : x      07-22    141  |  104  |  17  ----------------------------<  126<H>  4.2   |  19<L>  |  1.09    Ca    8.8      22 Jul 2019 06:30  Mg     2.0     07-21        CAPILLARY BLOOD GLUCOSE          Vital Signs Last 24 Hrs  T(C): 36.9 (22 Jul 2019 11:21), Max: 36.9 (22 Jul 2019 11:21)  T(F): 98.4 (22 Jul 2019 11:21), Max: 98.4 (22 Jul 2019 11:21)  HR: 78 (22 Jul 2019 11:21) (74 - 78)  BP: 131/67 (22 Jul 2019 11:21) (131/67 - 176/79)  BP(mean): --  RR: 18 (22 Jul 2019 11:21) (18 - 18)  SpO2: 99% (22 Jul 2019 11:21) (98% - 99%)          PHYSICAL EXAM:    Constitutional: NAD  HEENT: conjunctive   clear   Neck:  No JVD  Respiratory: decrease bs b/l   Cardiovascular: S1 and S2  Gastrointestinal: BS+, soft, NT/ND  Extremities: trace  peripheral edema  Neurological:  no focal deficits  Psychiatric: normal affect  Skin: dry  Access: Not applicable

## 2019-07-22 NOTE — PROGRESS NOTE ADULT - PROBLEM SELECTOR PLAN 1
CHRONIC KIDNEY DISEASE, STAGE 3: increase water intake , continue with iv fluid   Serum creatinine is stable at improved 1.09, approximating a GFR of is increased   There is no progression.  No uremic symptoms. No evidence of  worsening  Anemia. Fluid status stable.   Will continue to avoid nephrotoxic drugs.  Patient remains asymptomatic.  Continue current therapy.

## 2019-07-22 NOTE — PROGRESS NOTE ADULT - ASSESSMENT
Problem/Plan - 1:  ·  Problem: GIB (gastrointestinal bleeding).    - SERIAL CBC  - GI FU   - virtual colonoscopy reviewed    Problem/Plan - 2:  ·  Problem: Troponin level elevated.   - 2/2 to possible  NSTEMI -  - tele monitor  - cards fu   - await echo    Problem/Plan - 3:  ·  Problem: Abnormal CT of the abdomen.    -  virtual colonoscopy with no colonic mass     Problem/Plan - 4:·  Problem: Anemia.   - monitor cbc  - transfuse PRN      Problem/Plan - 5:  ·  Problem: UC (ulcerative colitis).    - continue home medications  - gi following    - c/w PPI

## 2019-07-23 LAB
ANION GAP SERPL CALC-SCNC: 14 MMOL/L — SIGNIFICANT CHANGE UP (ref 5–17)
BUN SERPL-MCNC: 15 MG/DL — SIGNIFICANT CHANGE UP (ref 7–23)
CALCIUM SERPL-MCNC: 9.2 MG/DL — SIGNIFICANT CHANGE UP (ref 8.4–10.5)
CHLORIDE SERPL-SCNC: 107 MMOL/L — SIGNIFICANT CHANGE UP (ref 96–108)
CO2 SERPL-SCNC: 21 MMOL/L — LOW (ref 22–31)
CREAT SERPL-MCNC: 1.64 MG/DL — HIGH (ref 0.5–1.3)
GLUCOSE SERPL-MCNC: 125 MG/DL — HIGH (ref 70–99)
HCT VFR BLD CALC: 32.2 % — LOW (ref 39–50)
HGB BLD-MCNC: 10 G/DL — LOW (ref 13–17)
MCHC RBC-ENTMCNC: 30 PG — SIGNIFICANT CHANGE UP (ref 27–34)
MCHC RBC-ENTMCNC: 31.1 GM/DL — LOW (ref 32–36)
MCV RBC AUTO: 96.7 FL — SIGNIFICANT CHANGE UP (ref 80–100)
PLATELET # BLD AUTO: 274 K/UL — SIGNIFICANT CHANGE UP (ref 150–400)
POTASSIUM SERPL-MCNC: 3.7 MMOL/L — SIGNIFICANT CHANGE UP (ref 3.5–5.3)
POTASSIUM SERPL-SCNC: 3.7 MMOL/L — SIGNIFICANT CHANGE UP (ref 3.5–5.3)
RBC # BLD: 3.33 M/UL — LOW (ref 4.2–5.8)
RBC # FLD: 14.6 % — HIGH (ref 10.3–14.5)
SODIUM SERPL-SCNC: 142 MMOL/L — SIGNIFICANT CHANGE UP (ref 135–145)
WBC # BLD: 6.55 K/UL — SIGNIFICANT CHANGE UP (ref 3.8–10.5)
WBC # FLD AUTO: 6.55 K/UL — SIGNIFICANT CHANGE UP (ref 3.8–10.5)

## 2019-07-23 PROCEDURE — 70450 CT HEAD/BRAIN W/O DYE: CPT | Mod: 26

## 2019-07-23 PROCEDURE — 93306 TTE W/DOPPLER COMPLETE: CPT | Mod: 26

## 2019-07-23 RX ORDER — HEPARIN SODIUM 5000 [USP'U]/ML
5000 INJECTION INTRAVENOUS; SUBCUTANEOUS EVERY 12 HOURS
Refills: 0 | Status: DISCONTINUED | OUTPATIENT
Start: 2019-07-23 | End: 2019-07-31

## 2019-07-23 RX ADMIN — CARBIDOPA AND LEVODOPA 1 TABLET(S): 25; 100 TABLET ORAL at 21:40

## 2019-07-23 RX ADMIN — Medication 0.5 MILLIGRAM(S): at 21:40

## 2019-07-23 RX ADMIN — CARBIDOPA AND LEVODOPA 1 TABLET(S): 25; 100 TABLET ORAL at 05:22

## 2019-07-23 RX ADMIN — PANTOPRAZOLE SODIUM 40 MILLIGRAM(S): 20 TABLET, DELAYED RELEASE ORAL at 05:22

## 2019-07-23 RX ADMIN — Medication 500 MILLIGRAM(S): at 05:22

## 2019-07-23 RX ADMIN — Medication 1 TABLET(S): at 11:37

## 2019-07-23 RX ADMIN — HEPARIN SODIUM 5000 UNIT(S): 5000 INJECTION INTRAVENOUS; SUBCUTANEOUS at 17:30

## 2019-07-23 RX ADMIN — PANTOPRAZOLE SODIUM 40 MILLIGRAM(S): 20 TABLET, DELAYED RELEASE ORAL at 17:31

## 2019-07-23 RX ADMIN — CARBIDOPA AND LEVODOPA 1 TABLET(S): 25; 100 TABLET ORAL at 13:10

## 2019-07-23 RX ADMIN — Medication 500 MILLIGRAM(S): at 17:31

## 2019-07-23 NOTE — PHYSICAL THERAPY INITIAL EVALUATION ADULT - PERTINENT HX OF CURRENT PROBLEM, REHAB EVAL
85 yo male Acoustic neuroma ,[place] , ,Parkinson Dz, unsteady gait ,  Chronic kidney disease  ,Gout  ,HTN (hypertension)   ,UC (ulcerative colitis) , resident of [facility] brought for eval of SOB and black stools for several days. F 85 yo male Acoustic neuroma, [place], Parkinson Dz, unsteady gait, chronic kidney disease, gout, HTN (hypertension), UC (ulcerative colitis), resident of [facility] brought for eval of SOB and black stools for several days.

## 2019-07-23 NOTE — PROGRESS NOTE ADULT - SUBJECTIVE AND OBJECTIVE BOX
Patient is a 84y Male whom presented to the hospital with ckd and pb    pt seen  and examined  in am  , nad , no fever   PAST MEDICAL & SURGICAL HISTORY:  UC (ulcerative colitis)  HTN (hypertension)  UC (ulcerative colitis)  HTN (hypertension)  Gout  Chronic kidney disease  Acoustic neuroma  No significant past surgical history  No significant past surgical history      MEDICATIONS  (STANDING):  atorvastatin 20 milliGRAM(s) Oral at bedtime  carbidopa/levodopa  25/100 1 Tablet(s) Oral three times a day  clonazePAM  Tablet 0.5 milliGRAM(s) Oral at bedtime  docusate sodium 100 milliGRAM(s) Oral two times a day  lactobacillus acidophilus 1 Tablet(s) Oral daily  latanoprost 0.005% Ophthalmic Solution 1 Drop(s) Both EYES at bedtime  multivitamin 1 Tablet(s) Oral daily  pantoprazole  Injectable 40 milliGRAM(s) IV Push every 12 hours  sodium chloride 0.9%. 1000 milliLiter(s) (75 mL/Hr) IV Continuous <Continuous>  sulfaSALAzine 500 milliGRAM(s) Oral two times a day  timolol 0.25% Solution 1 Drop(s) Both EYES two times a day      Allergies    No Known Allergies    Intolerances        SOCIAL HISTORY:  Denies ETOh,Smoking,     FAMILY HISTORY:      REVIEW OF SYSTEMS:    CONSTITUTIONAL: No weakness, fevers or chills  RESPIRATORY: No cough, wheezing, hemoptysis; pos  shortness of breath  CARDIOVASCULAR: No chest pain or palpitations  GASTROINTESTINAL: No abdominal or epigastric pain. No nausea, vomiting,     No diarrhea or constipation. pos  melena                                                                                      10.0   6.55  )-----------( 274      ( 23 Jul 2019 08:45 )             32.2       CBC Full  -  ( 23 Jul 2019 08:45 )  WBC Count : 6.55 K/uL  RBC Count : 3.33 M/uL  Hemoglobin : 10.0 g/dL  Hematocrit : 32.2 %  Platelet Count - Automated : 274 K/uL  Mean Cell Volume : 96.7 fl  Mean Cell Hemoglobin : 30.0 pg  Mean Cell Hemoglobin Concentration : 31.1 gm/dL  Auto Neutrophil # : x  Auto Lymphocyte # : x  Auto Monocyte # : x  Auto Eosinophil # : x  Auto Basophil # : x  Auto Neutrophil % : x  Auto Lymphocyte % : x  Auto Monocyte % : x  Auto Eosinophil % : x  Auto Basophil % : x      07-23    142  |  107  |  15  ----------------------------<  125<H>  3.7   |  21<L>  |  1.64<H>    Ca    9.2      23 Jul 2019 06:01        CAPILLARY BLOOD GLUCOSE          Vital Signs Last 24 Hrs  T(C): 36.4 (23 Jul 2019 20:49), Max: 36.7 (23 Jul 2019 11:31)  T(F): 97.5 (23 Jul 2019 20:49), Max: 98.1 (23 Jul 2019 11:31)  HR: 89 (23 Jul 2019 20:49) (60 - 89)  BP: 168/90 (23 Jul 2019 20:49) (127/65 - 168/90)  BP(mean): --  RR: 18 (23 Jul 2019 20:49) (18 - 18)  SpO2: 98% (23 Jul 2019 20:49) (97% - 98%)            PHYSICAL EXAM:    Constitutional: NAD  HEENT: conjunctive   clear   Neck:  No JVD  Respiratory: decrease bs b/l   Cardiovascular: S1 and S2  Gastrointestinal: BS+, soft, NT/ND  Extremities: trace  peripheral edema  Neurological:  no focal deficits  Psychiatric: normal affect  Skin: dry  Access: Not applicable

## 2019-07-23 NOTE — PHYSICAL THERAPY INITIAL EVALUATION ADULT - DIAGNOSIS, PT EVAL
Decr. functional mobility 2/2 decr. dynamic balance, postural control; gait dysfunction, cognitive impairment

## 2019-07-23 NOTE — PROGRESS NOTE ADULT - SUBJECTIVE AND OBJECTIVE BOX
GASTROENTEROLOGY    Patient seen and examined at bedside  events noted    MEDICATIONS  (STANDING):  atorvastatin 80 milliGRAM(s) Oral at bedtime  carbidopa/levodopa  25/100 1 Tablet(s) Oral three times a day  lactated ringers. 500 milliLiter(s) (50 mL/Hr) IV Continuous <Continuous>  latanoprost 0.005% Ophthalmic Solution 1 Drop(s) Both EYES at bedtime  melatonin 3 milliGRAM(s) Oral at bedtime  multivitamin 1 Tablet(s) Oral daily  pantoprazole  Injectable 40 milliGRAM(s) IV Push every 12 hours  sulfaSALAzine 500 milliGRAM(s) Oral two times a day  timolol 0.25% Solution 1 Drop(s) Both EYES two times a day  tobramycin 0.3% Ophthalmic Solution 1 Drop(s) Both EYES two times a day        T(F): 97.2 (07-21-19 @ 11:31), Max: 98.4 (07-21-19 @ 04:21)  HR: 82 (07-21-19 @ 11:31) (56 - 82)  BP: 159/80 (07-21-19 @ 11:31) (140/70 - 159/80)  RR: 18 (07-21-19 @ 11:31) (17 - 18)  SpO2: 99% (07-21-19 @ 11:31) (98% - 99%)  Wt(kg): --    PHYSICAL EXAM  GENERAL:   NAD  HEENT:  NC/AT, no JVD, sclera-anicteric  CHEST:  clear to ascultation bilaterally, respirations nonlabored  HEART:  +S1+S2   ABDOMEN:  Soft, non-tender, non-distended, normoactive bowel sounds  EXTREMITIES:  no cyanosis, clubbing or edema  NEURO:  Alert, confused  SKIN:  No rash/warm/dry      LABS:                        10.4<L>  7.02  )-----------( 291      ( 21 Jul 2019 10:25 )             33.7<L>  21 Jul 2019 10:25    07-21    142  |  107  |  18  ----------------------------<  93  4.1   |  16<L>  |  1.77<H>    Ca    9.5      21 Jul 2019 06:04  Mg     2.0     07-21          I&O's Detail    20 Jul 2019 07:01  -  21 Jul 2019 07:00  --------------------------------------------------------  IN:    Oral Fluid: 200 mL  Total IN: 200 mL    OUT:    Voided: 250 mL  Total OUT: 250 mL    Total NET: -50 mL

## 2019-07-23 NOTE — PROGRESS NOTE ADULT - ASSESSMENT
Problem/Plan - 1:  ·  Problem: GIB (gastrointestinal bleeding).    - SERIAL CBC  - GI FU   - virtual colonoscopy reviewed    Problem/Plan - 2:  ·  Problem: Troponin level elevated.   - 2/2 to possible  NSTEMI -  - tele monitor  - cards fu     Problem/Plan - 3:  ·  Problem: Abnormal CT of the abdomen.    -  virtual colonoscopy with no colonic mass     Problem/Plan - 4:·  Problem: Anemia.   - monitor cbc  - transfuse PRN      Problem/Plan - 5:  ·  Problem: UC (ulcerative colitis).    - continue home medications  - gi following    - c/w PPI

## 2019-07-23 NOTE — PHYSICAL THERAPY INITIAL EVALUATION ADULT - GENERAL OBSERVATIONS, REHAB EVAL
Pt received seated in chair, (+) telemetry, NAD. (+) 1:1 supervision. Pt confused, agreeable to PT eval with some encouragement.

## 2019-07-23 NOTE — PROGRESS NOTE ADULT - SUBJECTIVE AND OBJECTIVE BOX
S: Very confused today at time of exam. No distress. Denies chest pain or sob       MEDICATIONS  (STANDING):  atorvastatin 80 milliGRAM(s) Oral at bedtime  carbidopa/levodopa  25/100 1 Tablet(s) Oral three times a day  lactated ringers. 500 milliLiter(s) (50 mL/Hr) IV Continuous <Continuous>  latanoprost 0.005% Ophthalmic Solution 1 Drop(s) Both EYES at bedtime  melatonin 3 milliGRAM(s) Oral at bedtime  multivitamin 1 Tablet(s) Oral daily  pantoprazole  Injectable 40 milliGRAM(s) IV Push every 12 hours  sulfaSALAzine 500 milliGRAM(s) Oral two times a day  timolol 0.25% Solution 1 Drop(s) Both EYES two times a day  tobramycin 0.3% Ophthalmic Solution 1 Drop(s) Both EYES two times a day    MEDICATIONS  (PRN):  acetaminophen   Tablet .. 650 milliGRAM(s) Oral every 6 hours PRN Temp greater or equal to 38C (100.4F), Mild Pain (1 - 3), Moderate Pain (4 - 6)  clonazePAM  Tablet 0.5 milliGRAM(s) Oral at bedtime PRN anxiety      LABS:                            10.0   6.55  )-----------( 274      ( 23 Jul 2019 08:45 )             32.2     Hemoglobin: 10.0 g/dL (07-23 @ 08:45)  Hemoglobin: 10.5 g/dL (07-22 @ 19:43)  Hemoglobin: 9.9 g/dL (07-22 @ 08:42)  Hemoglobin: 10.4 g/dL (07-21 @ 10:25)  Hemoglobin: 9.5 g/dL (07-20 @ 10:53)    07-23    142  |  107  |  15  ----------------------------<  125<H>  3.7   |  21<L>  |  1.64<H>    Ca    9.2      23 Jul 2019 06:01      Creatinine Trend: 1.64<--, 1.09<--, 1.77<--, 1.74<--, 1.72<--, 1.76<--     PHYSICAL EXAM  Vital Signs Last 24 Hrs  T(C): 36.7 (23 Jul 2019 11:31), Max: 36.7 (23 Jul 2019 11:31)  T(F): 98.1 (23 Jul 2019 11:31), Max: 98.1 (23 Jul 2019 11:31)  HR: 87 (23 Jul 2019 14:08) (60 - 87)  BP: 127/65 (23 Jul 2019 14:08) (127/65 - 155/78)  BP(mean): --  RR: 18 (23 Jul 2019 11:31) (17 - 18)  SpO2: 98% (23 Jul 2019 14:08) (97% - 98%)      Lymphatic: No lymphadenopathy , no edema  Cardiovascular: Normal S1 S2,RRR, No murmurs , Peripheral pulses palpable 2+ bilaterally  Respiratory: Lungs clear to auscultation, normal effort 	  Gastrointestinal:  Soft, Non-tender, + BS	  Skin: No rashes, No ecchymoses, No cyanosis, warm to touch    TELEMETRY: SR 70-90	      ECG:  < from: 12 Lead ECG (07.16.19 @ 18:01) >  Diagnosis Line Normal sinus rhythm  Right bundle branch block  Possible Inferior infarct , age undetermined  Abnormal ECG  When compared with ECG of 15-JUL-2019 06:43,  No significant change was found    < end of copied text >      ASSESSMENT/PLAN: 85 yo M with history of HTN, CKD, UC, Parkinson's disease who is being seen for elevated troponin.    - Patient with no chest pain or anginal symptoms    - cardiac markers noted  - suspect elevated troponin are due to demand ischemia from GIB and renal disease  - Pending TTE to evaluate LV function  - F/u GI - virtual colon neg  - Would check CT head to eval worsening confusion  - Further cardiac w/u pending above    Arnoldo Birch PA-C  [facility]  Pager: [phone]

## 2019-07-23 NOTE — PROGRESS NOTE ADULT - ASSESSMENT
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 Found to have anemia and admitted for hemotransfusion and GI workup .Patient had colonoscopy 3 years ago and [place] and had some polyps removed ,he denies any recent hx of GIB or GI workup ,but admits black stools and lightheadedness ,sob ,worsening last few days .Found to have RANJANA elevation and seen by cardiologist Admitted  to telemetry unit for monitoring , send 3 sets of cardiac ensymes to rule out coronary event, obtain ECHO to evaluate LVEF, cardiology consult ,continue current management, O2 supply, anticoagulation plan as per cardiology  ,aggrenox placed on hold until GI clearance will be obtained Nephrology cons called ,IV hydration is administrated ,lisinopril held as per cardiologist recommendation . (15 Jul 2019 08:57)

## 2019-07-23 NOTE — PROGRESS NOTE ADULT - SUBJECTIVE AND OBJECTIVE BOX
Patient is a 84y old  Male who presents with a chief complaint of SOB , anemia ,black stools (22 Jul 2019 17:57)      SUBJECTIVE / OVERNIGHT EVENTS:  No chest pain. No shortness of breath. No complaints. No events overnight. confused at times.    MEDICATIONS  (STANDING):  atorvastatin 80 milliGRAM(s) Oral at bedtime  carbidopa/levodopa  25/100 1 Tablet(s) Oral three times a day  lactated ringers. 500 milliLiter(s) (50 mL/Hr) IV Continuous <Continuous>  latanoprost 0.005% Ophthalmic Solution 1 Drop(s) Both EYES at bedtime  melatonin 3 milliGRAM(s) Oral at bedtime  multivitamin 1 Tablet(s) Oral daily  pantoprazole  Injectable 40 milliGRAM(s) IV Push every 12 hours  sulfaSALAzine 500 milliGRAM(s) Oral two times a day  timolol 0.25% Solution 1 Drop(s) Both EYES two times a day  tobramycin 0.3% Ophthalmic Solution 1 Drop(s) Both EYES two times a day    MEDICATIONS  (PRN):  acetaminophen   Tablet .. 650 milliGRAM(s) Oral every 6 hours PRN Temp greater or equal to 38C (100.4F), Mild Pain (1 - 3), Moderate Pain (4 - 6)  clonazePAM  Tablet 0.5 milliGRAM(s) Oral at bedtime PRN anxiety      Vital Signs Last 24 Hrs  T(C): 36.7 (23 Jul 2019 11:31), Max: 36.7 (23 Jul 2019 11:31)  T(F): 98.1 (23 Jul 2019 11:31), Max: 98.1 (23 Jul 2019 11:31)  HR: 60 (23 Jul 2019 11:31) (60 - 75)  BP: 134/68 (23 Jul 2019 11:31) (134/68 - 155/78)  BP(mean): --  RR: 18 (23 Jul 2019 11:31) (17 - 18)  SpO2: 98% (23 Jul 2019 11:31) (97% - 98%)  CAPILLARY BLOOD GLUCOSE        I&O's Summary    22 Jul 2019 07:01  -  23 Jul 2019 07:00  --------------------------------------------------------  IN: 340 mL / OUT: 350 mL / NET: -10 mL    23 Jul 2019 07:01  -  23 Jul 2019 13:19  --------------------------------------------------------  IN: 0 mL / OUT: 220 mL / NET: -220 mL        PHYSICAL EXAM:  GENERAL: NAD, well-developed  HEAD:  Atraumatic, Normocephalic  EYES: EOMI, PERRLA, conjunctiva and sclera clear  NECK: Supple, No JVD  CHEST/LUNG: Clear to auscultation bilaterally; No wheeze  HEART: Regular rate and rhythm; No murmurs, rubs, or gallops  ABDOMEN: Soft, Nontender, Nondistended; Bowel sounds present  EXTREMITIES:  2+ Peripheral Pulses, No clubbing, cyanosis, or edema  PSYCH: AAOx 2 to 3  NEUROLOGY: non-focal  SKIN: No rashes or lesions    LABS:                        10.0   6.55  )-----------( 274      ( 23 Jul 2019 08:45 )             32.2     07-23    142  |  107  |  15  ----------------------------<  125<H>  3.7   |  21<L>  |  1.64<H>    Ca    9.2      23 Jul 2019 06:01                RADIOLOGY & ADDITIONAL TESTS:    Imaging Personally Reviewed:    Consultant(s) Notes Reviewed:      Care Discussed with Consultants/Other Providers:

## 2019-07-24 LAB
ANION GAP SERPL CALC-SCNC: 13 MMOL/L — SIGNIFICANT CHANGE UP (ref 5–17)
BUN SERPL-MCNC: 18 MG/DL — SIGNIFICANT CHANGE UP (ref 7–23)
CALCIUM SERPL-MCNC: 9.4 MG/DL — SIGNIFICANT CHANGE UP (ref 8.4–10.5)
CHLORIDE SERPL-SCNC: 104 MMOL/L — SIGNIFICANT CHANGE UP (ref 96–108)
CO2 SERPL-SCNC: 24 MMOL/L — SIGNIFICANT CHANGE UP (ref 22–31)
CREAT SERPL-MCNC: 1.77 MG/DL — HIGH (ref 0.5–1.3)
GLUCOSE SERPL-MCNC: 121 MG/DL — HIGH (ref 70–99)
HCT VFR BLD CALC: 33.1 % — LOW (ref 39–50)
HGB BLD-MCNC: 10.4 G/DL — LOW (ref 13–17)
MCHC RBC-ENTMCNC: 30.2 PG — SIGNIFICANT CHANGE UP (ref 27–34)
MCHC RBC-ENTMCNC: 31.4 GM/DL — LOW (ref 32–36)
MCV RBC AUTO: 96.2 FL — SIGNIFICANT CHANGE UP (ref 80–100)
PLATELET # BLD AUTO: 273 K/UL — SIGNIFICANT CHANGE UP (ref 150–400)
POTASSIUM SERPL-MCNC: 3.8 MMOL/L — SIGNIFICANT CHANGE UP (ref 3.5–5.3)
POTASSIUM SERPL-SCNC: 3.8 MMOL/L — SIGNIFICANT CHANGE UP (ref 3.5–5.3)
RAPID RVP RESULT: SIGNIFICANT CHANGE UP
RBC # BLD: 3.44 M/UL — LOW (ref 4.2–5.8)
RBC # FLD: 14.6 % — HIGH (ref 10.3–14.5)
SODIUM SERPL-SCNC: 141 MMOL/L — SIGNIFICANT CHANGE UP (ref 135–145)
WBC # BLD: 6.62 K/UL — SIGNIFICANT CHANGE UP (ref 3.8–10.5)
WBC # FLD AUTO: 6.62 K/UL — SIGNIFICANT CHANGE UP (ref 3.8–10.5)

## 2019-07-24 PROCEDURE — 71045 X-RAY EXAM CHEST 1 VIEW: CPT | Mod: 26

## 2019-07-24 PROCEDURE — 99223 1ST HOSP IP/OBS HIGH 75: CPT | Mod: GC

## 2019-07-24 PROCEDURE — 93010 ELECTROCARDIOGRAM REPORT: CPT

## 2019-07-24 RX ORDER — METOPROLOL TARTRATE 50 MG
25 TABLET ORAL
Refills: 0 | Status: DISCONTINUED | OUTPATIENT
Start: 2019-07-24 | End: 2019-07-25

## 2019-07-24 RX ORDER — QUETIAPINE FUMARATE 200 MG/1
25 TABLET, FILM COATED ORAL AT BEDTIME
Refills: 0 | Status: DISCONTINUED | OUTPATIENT
Start: 2019-07-24 | End: 2019-07-29

## 2019-07-24 RX ADMIN — Medication 500 MILLIGRAM(S): at 17:05

## 2019-07-24 RX ADMIN — CARBIDOPA AND LEVODOPA 1 TABLET(S): 25; 100 TABLET ORAL at 17:05

## 2019-07-24 RX ADMIN — Medication 500 MILLIGRAM(S): at 05:20

## 2019-07-24 RX ADMIN — LATANOPROST 1 DROP(S): 0.05 SOLUTION/ DROPS OPHTHALMIC; TOPICAL at 22:28

## 2019-07-24 RX ADMIN — PANTOPRAZOLE SODIUM 40 MILLIGRAM(S): 20 TABLET, DELAYED RELEASE ORAL at 17:04

## 2019-07-24 RX ADMIN — HEPARIN SODIUM 5000 UNIT(S): 5000 INJECTION INTRAVENOUS; SUBCUTANEOUS at 17:05

## 2019-07-24 RX ADMIN — Medication 25 MILLIGRAM(S): at 17:05

## 2019-07-24 RX ADMIN — CARBIDOPA AND LEVODOPA 1 TABLET(S): 25; 100 TABLET ORAL at 22:29

## 2019-07-24 RX ADMIN — CARBIDOPA AND LEVODOPA 1 TABLET(S): 25; 100 TABLET ORAL at 05:20

## 2019-07-24 RX ADMIN — PANTOPRAZOLE SODIUM 40 MILLIGRAM(S): 20 TABLET, DELAYED RELEASE ORAL at 05:20

## 2019-07-24 RX ADMIN — ATORVASTATIN CALCIUM 80 MILLIGRAM(S): 80 TABLET, FILM COATED ORAL at 22:29

## 2019-07-24 RX ADMIN — Medication 1 DROP(S): at 17:10

## 2019-07-24 RX ADMIN — Medication 1 TABLET(S): at 17:05

## 2019-07-24 RX ADMIN — QUETIAPINE FUMARATE 25 MILLIGRAM(S): 200 TABLET, FILM COATED ORAL at 22:29

## 2019-07-24 RX ADMIN — HEPARIN SODIUM 5000 UNIT(S): 5000 INJECTION INTRAVENOUS; SUBCUTANEOUS at 05:20

## 2019-07-24 RX ADMIN — Medication 3 MILLIGRAM(S): at 22:28

## 2019-07-24 NOTE — BEHAVIORAL HEALTH ASSESSMENT NOTE - NSBHCHARTREVIEWIMAGING_PSY_A_CORE FT
< from: CT Head No Cont (07.23.19 @ 16:48) >      IMPRESSION:  Age appropriate involutional change. Microvascular ischemic   change                    SHARATH JEREZ M.D., ATTENDING RADIOLOGIST  This document has been electronically signed.Jul 23 2019  4:58PM          < end of copied text >

## 2019-07-24 NOTE — BEHAVIORAL HEALTH ASSESSMENT NOTE - NSBHCHARTREVIEWLAB_PSY_A_CORE FT
10.0   6.55  )-----------( 274      ( 23 Jul 2019 08:45 )             32.2     07-24    141  |  104  |  18  ----------------------------<  121<H>  3.8   |  24  |  1.77<H>    Ca    9.4      24 Jul 2019 07:23

## 2019-07-24 NOTE — PROGRESS NOTE ADULT - SUBJECTIVE AND OBJECTIVE BOX
S: Family at bedside. Remains confused. No distress.      MEDICATIONS  (STANDING):  atorvastatin 80 milliGRAM(s) Oral at bedtime  carbidopa/levodopa  25/100 1 Tablet(s) Oral three times a day  heparin  Injectable 5000 Unit(s) SubCutaneous every 12 hours  lactated ringers. 500 milliLiter(s) (50 mL/Hr) IV Continuous <Continuous>  latanoprost 0.005% Ophthalmic Solution 1 Drop(s) Both EYES at bedtime  melatonin 3 milliGRAM(s) Oral at bedtime  multivitamin 1 Tablet(s) Oral daily  pantoprazole  Injectable 40 milliGRAM(s) IV Push every 12 hours  QUEtiapine 25 milliGRAM(s) Oral at bedtime  sulfaSALAzine 500 milliGRAM(s) Oral two times a day  timolol 0.25% Solution 1 Drop(s) Both EYES two times a day    MEDICATIONS  (PRN):  acetaminophen   Tablet .. 650 milliGRAM(s) Oral every 6 hours PRN Temp greater or equal to 38C (100.4F), Mild Pain (1 - 3), Moderate Pain (4 - 6)      LABS:                            10.4   6.62  )-----------( 273      ( 24 Jul 2019 10:08 )             33.1     Hemoglobin: 10.4 g/dL (07-24 @ 10:08)  Hemoglobin: 10.0 g/dL (07-23 @ 08:45)  Hemoglobin: 10.5 g/dL (07-22 @ 19:43)  Hemoglobin: 9.9 g/dL (07-22 @ 08:42)  Hemoglobin: 10.4 g/dL (07-21 @ 10:25)    07-24    141  |  104  |  18  ----------------------------<  121<H>  3.8   |  24  |  1.77<H>    Ca    9.4      24 Jul 2019 07:23      Creatinine Trend: 1.77<--, 1.64<--, 1.09<--, 1.77<--, 1.74<--, 1.72<--           PHYSICAL EXAM  Vital Signs Last 24 Hrs  T(C): 36.8 (24 Jul 2019 11:25), Max: 37.4 (24 Jul 2019 05:26)  T(F): 98.3 (24 Jul 2019 11:25), Max: 99.4 (24 Jul 2019 05:26)  HR: 84 (24 Jul 2019 11:25) (83 - 89)  BP: 156/89 (24 Jul 2019 11:25) (156/89 - 168/90)  BP(mean): --  RR: 17 (24 Jul 2019 11:25) (17 - 18)  SpO2: 96% (24 Jul 2019 11:25) (96% - 98%)        Lymphatic: No lymphadenopathy , no edema  Cardiovascular: Normal S1 S2,RRR, No murmurs , Peripheral pulses palpable 2+ bilaterally  Respiratory: Lungs clear to auscultation, normal effort 	  Gastrointestinal:  Soft, Non-tender, + BS	  Skin: No rashes, No ecchymoses, No cyanosis, warm to touch    TELEMETRY: SR 70-80    ECG:  < from: 12 Lead ECG (07.16.19 @ 18:01) >  Diagnosis Line Normal sinus rhythm  Right bundle branch block  Possible Inferior infarct , age undetermined  Abnormal ECG  When compared with ECG of 15-JUL-2019 06:43,  No significant change was found    < end of copied text >    < from: TTE with Doppler (w/Cont) (07.23.19 @ 18:39) >  Conclusions:  1. Mitral annular calcification and calcified mitral  leaflets with normal diastolic opening.  2. Calcified trileaflet aortic valve with normal opening.  3. Normal left ventricular internal dimensions and wall  thicknesses.  4. Despite the use of ultrasound enhancing agent, the  endocardium is not well visualized; grossly normal left  ventricular systolic function. Unable to accurately assess  for wall motion abnormalities.  5. Normal diastolic function  6. The right ventricle is not well visualized; grossly  normal right ventricular systolic function.  *** No previous Echo exam.    < end of copied text >    < from: CT Head No Cont (07.23.19 @ 16:48) >  IMPRESSION:  Age appropriate involutional change. Microvascular ischemic   change    < end of copied text >      ASSESSMENT/PLAN: 83 yo M with history of HTN, CKD, UC, Parkinson's disease who is being seen for elevated troponin.    - Patient with no chest pain or anginal symptoms    - cardiac markers noted  - suspect elevated troponin are due to demand ischemia from GIB and renal disease  - F/u GI - virtual colon neg  - CT head noted above  - TTE noted above - normal LV/RV function  - AMS w/u per primary team    Arnoldo Birch PA-C  [facility]  Pager: [phone] S: Family at bedside. Remains confused. No distress.      MEDICATIONS  (STANDING):  atorvastatin 80 milliGRAM(s) Oral at bedtime  carbidopa/levodopa  25/100 1 Tablet(s) Oral three times a day  heparin  Injectable 5000 Unit(s) SubCutaneous every 12 hours  lactated ringers. 500 milliLiter(s) (50 mL/Hr) IV Continuous <Continuous>  latanoprost 0.005% Ophthalmic Solution 1 Drop(s) Both EYES at bedtime  melatonin 3 milliGRAM(s) Oral at bedtime  multivitamin 1 Tablet(s) Oral daily  pantoprazole  Injectable 40 milliGRAM(s) IV Push every 12 hours  QUEtiapine 25 milliGRAM(s) Oral at bedtime  sulfaSALAzine 500 milliGRAM(s) Oral two times a day  timolol 0.25% Solution 1 Drop(s) Both EYES two times a day    MEDICATIONS  (PRN):  acetaminophen   Tablet .. 650 milliGRAM(s) Oral every 6 hours PRN Temp greater or equal to 38C (100.4F), Mild Pain (1 - 3), Moderate Pain (4 - 6)      LABS:                            10.4   6.62  )-----------( 273      ( 24 Jul 2019 10:08 )             33.1     Hemoglobin: 10.4 g/dL (07-24 @ 10:08)  Hemoglobin: 10.0 g/dL (07-23 @ 08:45)  Hemoglobin: 10.5 g/dL (07-22 @ 19:43)  Hemoglobin: 9.9 g/dL (07-22 @ 08:42)  Hemoglobin: 10.4 g/dL (07-21 @ 10:25)    07-24    141  |  104  |  18  ----------------------------<  121<H>  3.8   |  24  |  1.77<H>    Ca    9.4      24 Jul 2019 07:23      Creatinine Trend: 1.77<--, 1.64<--, 1.09<--, 1.77<--, 1.74<--, 1.72<--           PHYSICAL EXAM  Vital Signs Last 24 Hrs  T(C): 36.8 (24 Jul 2019 11:25), Max: 37.4 (24 Jul 2019 05:26)  T(F): 98.3 (24 Jul 2019 11:25), Max: 99.4 (24 Jul 2019 05:26)  HR: 84 (24 Jul 2019 11:25) (83 - 89)  BP: 156/89 (24 Jul 2019 11:25) (156/89 - 168/90)  BP(mean): --  RR: 17 (24 Jul 2019 11:25) (17 - 18)  SpO2: 96% (24 Jul 2019 11:25) (96% - 98%)        Lymphatic: No lymphadenopathy , no edema  Cardiovascular: Normal S1 S2,RRR, No murmurs , Peripheral pulses palpable 2+ bilaterally  Respiratory: Lungs clear to auscultation, normal effort 	  Gastrointestinal:  Soft, Non-tender, + BS	  Skin: No rashes, No ecchymoses, No cyanosis, warm to touch    TELEMETRY: SR 70-80    ECG:  < from: 12 Lead ECG (07.16.19 @ 18:01) >  Diagnosis Line Normal sinus rhythm  Right bundle branch block  Possible Inferior infarct , age undetermined  Abnormal ECG  When compared with ECG of 15-JUL-2019 06:43,  No significant change was found    < end of copied text >    < from: TTE with Doppler (w/Cont) (07.23.19 @ 18:39) >  Conclusions:  1. Mitral annular calcification and calcified mitral  leaflets with normal diastolic opening.  2. Calcified trileaflet aortic valve with normal opening.  3. Normal left ventricular internal dimensions and wall  thicknesses.  4. Despite the use of ultrasound enhancing agent, the  endocardium is not well visualized; grossly normal left  ventricular systolic function. Unable to accurately assess  for wall motion abnormalities.  5. Normal diastolic function  6. The right ventricle is not well visualized; grossly  normal right ventricular systolic function.  *** No previous Echo exam.    < end of copied text >    < from: CT Head No Cont (07.23.19 @ 16:48) >  IMPRESSION:  Age appropriate involutional change. Microvascular ischemic   change    < end of copied text >      ASSESSMENT/PLAN: 85 yo M with history of HTN, CKD, UC, Parkinson's disease who is being seen for elevated troponin.    - Patient with no chest pain or anginal symptoms    - cardiac markers noted  - suspect elevated troponin are due to demand ischemia from GIB and renal disease  - F/u GI - virtual colon neg  - CT head noted above  - TTE noted above - normal LV/RV function  - AMS w/u per primary team  - No further inpatient cardiac w/u needed at this time    Arnoldo Birch PA-C  [facility]  Pager: [phone]

## 2019-07-24 NOTE — BEHAVIORAL HEALTH ASSESSMENT NOTE - HPI (INCLUDE ILLNESS QUALITY, SEVERITY, DURATION, TIMING, CONTEXT, MODIFYING FACTORS, ASSOCIATED SIGNS AND SYMPTOMS)
Patient is an 84 year old man, domiciled at [facility], no known PPHx, no known prior psychiatric hospitalizations, PMH of Acoustic neuroma ,[place] , ,Parkinson Dz, unsteady gait ,  Chronic kidney disease  ,Gout  ,HTN (hypertension)   ,UC (ulcerative colitis), admitted for GI bleed and hemotransfusion, psychiatry consulted for acute change in mental status.     Patient was examined at bedside, whispered unintelligible words when asked his name, was unable to answer any other questions. Collateral was attempted to be obtained from son, Abdifatah Casey (), but did not answer and voicemail was full.    Per team, patient's mental status acutely changed yesterday, when patient went from being able to interact with staff and answer questions, to becoming unintelligible and unable to answer questions. Patient has no known psychiatric history. Patient received a head CT, which demonstrated microvascular changes. Patient does not have a fever or elevated WBC. Patient's last UA (done 9 days ago on 07/15/19), showed few bacteria and was otherwise unremarkable.

## 2019-07-24 NOTE — PROGRESS NOTE ADULT - ASSESSMENT
Problem/Plan - 1:  ·  Problem: GIB (gastrointestinal bleeding).    - SERIAL CBC  - GI FU   - virtual colonoscopy reviewed    Problem/Plan - 2:  ·  Problem: Troponin level elevated.   - 2/2 to possible  NSTEMI -  - tele monitor  - cards fu     Problem/Plan - 3:  ·  Problem: Abnormal CT of the abdomen.    -  virtual colonoscopy with no colonic mass     Problem/Plan - 4:·  Problem: Anemia.   - monitor cbc  - transfuse PRN      Problem/Plan - 5:  ·  Problem: UC (ulcerative colitis).    - continue home medications  - gi following    - c/w PPI    Problem/Plan - 6:  AMS, r/o  metabolic encephalopathy  urine and urine culture  chest x ray  blood culture x 2 sets  rvp

## 2019-07-24 NOTE — PROGRESS NOTE ADULT - ASSESSMENT
85 yo male Acoustic neuroma ,[place] , ,Parkinson Dz, unsteady gait ,  Chronic kidney disease  ,Gout  ,HTN (hypertension)   ,UC (ulcerative colitis) , resident of [facility] brought for eval of SOB and black stools for several days. Found to have anemia and admitted for hemotransfusion and GI workup .Patient had colonoscopy 3 years ago and [place] and had some polyps removed ,he denies any recent hx of GIB or GI workup ,but admits black stools and lightheadedness ,sob ,worsening last few days .Found to have RANJANA elevation and seen by cardiologist Admitted  to telemetry unit for monitoring , send 3 sets of cardiac ensymes to rule out coronary event, obtain ECHO to evaluate LVEF, cardiology consult ,continue current management, O2 supply, anticoagulation plan as per cardiology  ,aggrenox placed on hold until GI clearance will be obtained Nephrology cons called ,IV hydration is administrated ,lisinopril held as per cardiologist recommendation . (15 Jul 2019 08:57)

## 2019-07-24 NOTE — BEHAVIORAL HEALTH ASSESSMENT NOTE - SUMMARY
Patient is an 84 year old man, domiciled at [facility] of Acoustic neuroma ,[place] , ,Parkinson Dz, unsteady gait ,  Chronic kidney disease  ,Gout  ,HTN (hypertension)   ,UC (ulcerative colitis), admitted for GI bleed and hemotransfusion, psychiatry consulted for acute change in mental status.    Patient was disoriented and unable to answer any questions that were asked. Per team, patient had an acute change in mental status yesterday. CT demonstrated microvascular changes, no elevation in WBC, no fever.     Consider UA to rule out infection as source of acute mental status change.   Consider Seroquel 25mg qHS and melatonin 3mg qHS for treatment of delirium.

## 2019-07-24 NOTE — BEHAVIORAL HEALTH ASSESSMENT NOTE - CASE SUMMARY
Patient is an 84 year old man, domiciled at [facility] of Acoustic neuroma ,[place] , ,Parkinson Dz, unsteady gait ,  Chronic kidney disease  ,Gout  ,HTN (hypertension)   ,UC (ulcerative colitis), admitted for GI bleed and hemotransfusion, psychiatry consulted for acute change in mental status.    Patient presents with clouding of sensorium indicative of delirium. I have seen and evaluated this patient myself. Chart, labs, meds reviewed. I agree with resident's assessment and plan.

## 2019-07-24 NOTE — PROGRESS NOTE ADULT - SUBJECTIVE AND OBJECTIVE BOX
GASTROENTEROLOGY    Patient seen and examined at bedside  resting comfortably in bed  no new events    MEDICATIONS  (STANDING):  atorvastatin 80 milliGRAM(s) Oral at bedtime  carbidopa/levodopa  25/100 1 Tablet(s) Oral three times a day  lactated ringers. 500 milliLiter(s) (50 mL/Hr) IV Continuous <Continuous>  latanoprost 0.005% Ophthalmic Solution 1 Drop(s) Both EYES at bedtime  melatonin 3 milliGRAM(s) Oral at bedtime  multivitamin 1 Tablet(s) Oral daily  pantoprazole  Injectable 40 milliGRAM(s) IV Push every 12 hours  sulfaSALAzine 500 milliGRAM(s) Oral two times a day  timolol 0.25% Solution 1 Drop(s) Both EYES two times a day  tobramycin 0.3% Ophthalmic Solution 1 Drop(s) Both EYES two times a day        T(F): 97.2 (07-21-19 @ 11:31), Max: 98.4 (07-21-19 @ 04:21)  HR: 82 (07-21-19 @ 11:31) (56 - 82)  BP: 159/80 (07-21-19 @ 11:31) (140/70 - 159/80)  RR: 18 (07-21-19 @ 11:31) (17 - 18)  SpO2: 99% (07-21-19 @ 11:31) (98% - 99%)  Wt(kg): --    PHYSICAL EXAM  GENERAL:   NAD  HEENT:  NC/AT, no JVD, sclera-anicteric  CHEST:  clear to ascultation bilaterally, respirations nonlabored  HEART:  +S1+S2   ABDOMEN:  Soft, non-tender, non-distended, normoactive bowel sounds  EXTREMITIES:  no cyanosis, clubbing or edema  NEURO:  Alert, confused  SKIN:  No rash/warm/dry      LABS:                        10.4<L>  7.02  )-----------( 291      ( 21 Jul 2019 10:25 )             33.7<L>  21 Jul 2019 10:25    07-21    142  |  107  |  18  ----------------------------<  93  4.1   |  16<L>  |  1.77<H>    Ca    9.5      21 Jul 2019 06:04  Mg     2.0     07-21          I&O's Detail    20 Jul 2019 07:01  -  21 Jul 2019 07:00  --------------------------------------------------------  IN:    Oral Fluid: 200 mL  Total IN: 200 mL    OUT:    Voided: 250 mL  Total OUT: 250 mL    Total NET: -50 mL

## 2019-07-24 NOTE — BEHAVIORAL HEALTH ASSESSMENT NOTE - RISK ASSESSMENT
Risk factors: acute mental status change, Hx of Parkinson's    Protective factors: no known SIIP/HIIP, no h/o SA/SIB, no h/o psych admissions, no active substance abuse, no psychosis, domiciled, social supports,     Overall, pt is a low risk of harm to self/others and does not require psychiatric admission for safety and stabilization.

## 2019-07-24 NOTE — BEHAVIORAL HEALTH ASSESSMENT NOTE - NSBHCHARTREVIEWVS_PSY_A_CORE FT
Vital Signs Last 24 Hrs  T(C): 37.4 (24 Jul 2019 05:26), Max: 37.4 (24 Jul 2019 05:26)  T(F): 99.4 (24 Jul 2019 05:26), Max: 99.4 (24 Jul 2019 05:26)  HR: 83 (24 Jul 2019 05:26) (60 - 89)  BP: 162/94 (24 Jul 2019 05:26) (127/65 - 168/90)  BP(mean): --  RR: 18 (24 Jul 2019 05:26) (18 - 18)  SpO2: 97% (24 Jul 2019 05:26) (97% - 98%)

## 2019-07-24 NOTE — PROGRESS NOTE ADULT - SUBJECTIVE AND OBJECTIVE BOX
Patient is a 84y old  Male who presents with a chief complaint of SOB , anemia ,black stools (24 Jul 2019 13:35)      SUBJECTIVE / OVERNIGHT EVENTS:  pt has AMS.  very confused compared to baseline as per family,  ct head was done.    MEDICATIONS  (STANDING):  atorvastatin 80 milliGRAM(s) Oral at bedtime  carbidopa/levodopa  25/100 1 Tablet(s) Oral three times a day  heparin  Injectable 5000 Unit(s) SubCutaneous every 12 hours  lactated ringers. 500 milliLiter(s) (50 mL/Hr) IV Continuous <Continuous>  latanoprost 0.005% Ophthalmic Solution 1 Drop(s) Both EYES at bedtime  melatonin 3 milliGRAM(s) Oral at bedtime  multivitamin 1 Tablet(s) Oral daily  pantoprazole  Injectable 40 milliGRAM(s) IV Push every 12 hours  QUEtiapine 25 milliGRAM(s) Oral at bedtime  sulfaSALAzine 500 milliGRAM(s) Oral two times a day  timolol 0.25% Solution 1 Drop(s) Both EYES two times a day    MEDICATIONS  (PRN):  acetaminophen   Tablet .. 650 milliGRAM(s) Oral every 6 hours PRN Temp greater or equal to 38C (100.4F), Mild Pain (1 - 3), Moderate Pain (4 - 6)      Vital Signs Last 24 Hrs  T(C): 36.8 (24 Jul 2019 11:25), Max: 37.4 (24 Jul 2019 05:26)  T(F): 98.3 (24 Jul 2019 11:25), Max: 99.4 (24 Jul 2019 05:26)  HR: 84 (24 Jul 2019 11:25) (83 - 89)  BP: 156/89 (24 Jul 2019 11:25) (156/89 - 168/90)  BP(mean): --  RR: 17 (24 Jul 2019 11:25) (17 - 18)  SpO2: 96% (24 Jul 2019 11:25) (96% - 98%)  CAPILLARY BLOOD GLUCOSE        I&O's Summary    23 Jul 2019 07:01  -  24 Jul 2019 07:00  --------------------------------------------------------  IN: 120 mL / OUT: 220 mL / NET: -100 mL    24 Jul 2019 07:01  -  24 Jul 2019 14:19  --------------------------------------------------------  IN: 180 mL / OUT: 0 mL / NET: 180 mL        PHYSICAL EXAM:  GENERAL: NAD, well-developed  HEAD:  Atraumatic, Normocephalic  EYES: EOMI, PERRLA, conjunctiva and sclera clear  NECK: Supple, No JVD  CHEST/LUNG: Clear to auscultation bilaterally; No wheeze  HEART: Regular rate and rhythm; No murmurs, rubs, or gallops  ABDOMEN: Soft, Nontender, Nondistended; Bowel sounds present  EXTREMITIES:  2+ Peripheral Pulses, No clubbing, cyanosis, or edema  PSYCH: AAOx1  NEUROLOGY: non-focal  SKIN: No rashes or lesions    LABS:                        10.4   6.62  )-----------( 273      ( 24 Jul 2019 10:08 )             33.1     07-24    141  |  104  |  18  ----------------------------<  121<H>  3.8   |  24  |  1.77<H>    Ca    9.4      24 Jul 2019 07:23                RADIOLOGY & ADDITIONAL TESTS:    Imaging Personally Reviewed:    < from: CT Head No Cont (07.23.19 @ 16:48) >    FINDINGS:    VENTRICLES AND SULCI: Age appropriate involutional change  INTRA-AXIAL:  Microvascular ischemic change  EXTRA-AXIAL:  No mass or collection is seen.  VISUALIZED SINUSES:  Clear.  VISUALIZED MASTOIDS:  Clear.  CALVARIUM:  Normal.  MISCELLANEOUS:  None.    IMPRESSION:  Age appropriate involutional change. Microvascular ischemic   change    < end of copied text >    Consultant(s) Notes Reviewed:      Care Discussed with Consultants/Other Providers:

## 2019-07-24 NOTE — BEHAVIORAL HEALTH ASSESSMENT NOTE - AXIS III
Acoustic neuroma ,[place] , ,Parkinson Dz, unsteady gait ,  Chronic kidney disease  ,Gout  ,HTN (hypertension)   ,UC (ulcerative colitis)

## 2019-07-24 NOTE — PROGRESS NOTE ADULT - SUBJECTIVE AND OBJECTIVE BOX
Patient is a 84y Male whom presented to the hospital with ckd and pb    pt seen  and examined  in am  , nad , no fever   PAST MEDICAL & SURGICAL HISTORY:  UC (ulcerative colitis)  HTN (hypertension)  UC (ulcerative colitis)  HTN (hypertension)  Gout  Chronic kidney disease  Acoustic neuroma  No significant past surgical history  No significant past surgical history      MEDICATIONS  (STANDING):  atorvastatin 20 milliGRAM(s) Oral at bedtime  carbidopa/levodopa  25/100 1 Tablet(s) Oral three times a day  clonazePAM  Tablet 0.5 milliGRAM(s) Oral at bedtime  docusate sodium 100 milliGRAM(s) Oral two times a day  lactobacillus acidophilus 1 Tablet(s) Oral daily  latanoprost 0.005% Ophthalmic Solution 1 Drop(s) Both EYES at bedtime  multivitamin 1 Tablet(s) Oral daily  pantoprazole  Injectable 40 milliGRAM(s) IV Push every 12 hours  sodium chloride 0.9%. 1000 milliLiter(s) (75 mL/Hr) IV Continuous <Continuous>  sulfaSALAzine 500 milliGRAM(s) Oral two times a day  timolol 0.25% Solution 1 Drop(s) Both EYES two times a day      Allergies    No Known Allergies    Intolerances        SOCIAL HISTORY:  Denies ETOh,Smoking,     FAMILY HISTORY:      REVIEW OF SYSTEMS:    CONSTITUTIONAL: No weakness, fevers or chills  RESPIRATORY: No cough, wheezing, hemoptysis; pos  shortness of breath  CARDIOVASCULAR: No chest pain or palpitations  GASTROINTESTINAL: No abdominal or epigastric pain. No nausea, vomiting,     No diarrhea or constipation. pos  melena                                                                                                       10.4   6.62  )-----------( 273      ( 24 Jul 2019 10:08 )             33.1       CBC Full  -  ( 24 Jul 2019 10:08 )  WBC Count : 6.62 K/uL  RBC Count : 3.44 M/uL  Hemoglobin : 10.4 g/dL  Hematocrit : 33.1 %  Platelet Count - Automated : 273 K/uL  Mean Cell Volume : 96.2 fl  Mean Cell Hemoglobin : 30.2 pg  Mean Cell Hemoglobin Concentration : 31.4 gm/dL  Auto Neutrophil # : x  Auto Lymphocyte # : x  Auto Monocyte # : x  Auto Eosinophil # : x  Auto Basophil # : x  Auto Neutrophil % : x  Auto Lymphocyte % : x  Auto Monocyte % : x  Auto Eosinophil % : x  Auto Basophil % : x      07-24    141  |  104  |  18  ----------------------------<  121<H>  3.8   |  24  |  1.77<H>    Ca    9.4      24 Jul 2019 07:23        CAPILLARY BLOOD GLUCOSE          Vital Signs Last 24 Hrs  T(C): 36.9 (24 Jul 2019 21:14), Max: 37.4 (24 Jul 2019 05:26)  T(F): 98.5 (24 Jul 2019 21:14), Max: 99.4 (24 Jul 2019 05:26)  HR: 70 (24 Jul 2019 21:14) (70 - 84)  BP: 131/69 (24 Jul 2019 21:14) (131/69 - 164/70)  BP(mean): --  RR: 18 (24 Jul 2019 21:14) (17 - 18)  SpO2: 98% (24 Jul 2019 21:14) (96% - 98%)                PHYSICAL EXAM:    Constitutional: NAD  HEENT: conjunctive   clear   Neck:  No JVD  Respiratory: decrease bs b/l   Cardiovascular: S1 and S2  Gastrointestinal: BS+, soft, NT/ND  Extremities: trace  peripheral edema  Neurological:  no focal deficits  Psychiatric: normal affect  Skin: dry  Access: Not applicable

## 2019-07-24 NOTE — BEHAVIORAL HEALTH ASSESSMENT NOTE - DESCRIPTION
Acoustic neuroma ,[place] , ,Parkinson Dz, unsteady gait ,  Chronic kidney disease  ,Gout  ,HTN (hypertension)   ,UC (ulcerative colitis)

## 2019-07-24 NOTE — BEHAVIORAL HEALTH ASSESSMENT NOTE - NSBHCHARTREVIEWINVESTIGATE_PSY_A_CORE FT
< from: 12 Lead ECG (07.16.19 @ 18:01) >      Ventricular Rate 65 BPM    Atrial Rate 65 BPM    P-R Interval 142 ms    QRS Duration 144 ms    Q-T Interval 482 ms    QTC Calculation(Bezet) 501 ms    P Axis 53 degrees    R Axis 102 degrees    T Axis -27 degrees    Diagnosis Line Normal sinus rhythm  Right bundle branch block  Possible Inferior infarct , age undetermined  Abnormal ECG  When compared with ECG of 15-JUL-2019 06:43,  No significant change was found  Confirmed by Kannan STEVENS, Julio Cesar ([id number]) on 7/17/2019 5:35:37 PM    < end of copied text >

## 2019-07-25 LAB
ANION GAP SERPL CALC-SCNC: 16 MMOL/L — SIGNIFICANT CHANGE UP (ref 5–17)
APPEARANCE UR: CLEAR — SIGNIFICANT CHANGE UP
BACTERIA # UR AUTO: NEGATIVE — SIGNIFICANT CHANGE UP
BILIRUB UR-MCNC: NEGATIVE — SIGNIFICANT CHANGE UP
BUN SERPL-MCNC: 21 MG/DL — SIGNIFICANT CHANGE UP (ref 7–23)
CALCIUM SERPL-MCNC: 9.1 MG/DL — SIGNIFICANT CHANGE UP (ref 8.4–10.5)
CHLORIDE SERPL-SCNC: 107 MMOL/L — SIGNIFICANT CHANGE UP (ref 96–108)
CO2 SERPL-SCNC: 21 MMOL/L — LOW (ref 22–31)
COLOR SPEC: YELLOW — SIGNIFICANT CHANGE UP
COMMENT - URINE: SIGNIFICANT CHANGE UP
CREAT SERPL-MCNC: 2.07 MG/DL — HIGH (ref 0.5–1.3)
DIFF PNL FLD: NEGATIVE — SIGNIFICANT CHANGE UP
EPI CELLS # UR: 4 /HPF — SIGNIFICANT CHANGE UP (ref 0–5)
GLUCOSE BLDC GLUCOMTR-MCNC: 128 MG/DL — HIGH (ref 70–99)
GLUCOSE BLDC GLUCOMTR-MCNC: 202 MG/DL — HIGH (ref 70–99)
GLUCOSE SERPL-MCNC: 108 MG/DL — HIGH (ref 70–99)
GLUCOSE UR QL: NEGATIVE — SIGNIFICANT CHANGE UP
HCT VFR BLD CALC: 35.2 % — LOW (ref 39–50)
HGB BLD-MCNC: 11.1 G/DL — LOW (ref 13–17)
HYALINE CASTS # UR AUTO: 4 /LPF — SIGNIFICANT CHANGE UP (ref 0–7)
KETONES UR-MCNC: ABNORMAL
LEUKOCYTE ESTERASE UR-ACNC: NEGATIVE — SIGNIFICANT CHANGE UP
MCHC RBC-ENTMCNC: 30.9 PG — SIGNIFICANT CHANGE UP (ref 27–34)
MCHC RBC-ENTMCNC: 31.5 GM/DL — LOW (ref 32–36)
MCV RBC AUTO: 98.1 FL — SIGNIFICANT CHANGE UP (ref 80–100)
NITRITE UR-MCNC: NEGATIVE — SIGNIFICANT CHANGE UP
PH UR: 5.5 — SIGNIFICANT CHANGE UP (ref 5–8)
PLATELET # BLD AUTO: 249 K/UL — SIGNIFICANT CHANGE UP (ref 150–400)
POTASSIUM SERPL-MCNC: 3.6 MMOL/L — SIGNIFICANT CHANGE UP (ref 3.5–5.3)
POTASSIUM SERPL-SCNC: 3.6 MMOL/L — SIGNIFICANT CHANGE UP (ref 3.5–5.3)
PROT UR-MCNC: ABNORMAL
RBC # BLD: 3.59 M/UL — LOW (ref 4.2–5.8)
RBC # FLD: 14.7 % — HIGH (ref 10.3–14.5)
RBC CASTS # UR COMP ASSIST: 2 /HPF — SIGNIFICANT CHANGE UP (ref 0–4)
SODIUM SERPL-SCNC: 144 MMOL/L — SIGNIFICANT CHANGE UP (ref 135–145)
SP GR SPEC: 1.02 — SIGNIFICANT CHANGE UP (ref 1.01–1.02)
UROBILINOGEN FLD QL: SIGNIFICANT CHANGE UP
WBC # BLD: 6.37 K/UL — SIGNIFICANT CHANGE UP (ref 3.8–10.5)
WBC # FLD AUTO: 6.37 K/UL — SIGNIFICANT CHANGE UP (ref 3.8–10.5)
WBC UR QL: 2 /HPF — SIGNIFICANT CHANGE UP (ref 0–5)

## 2019-07-25 PROCEDURE — 93010 ELECTROCARDIOGRAM REPORT: CPT

## 2019-07-25 RX ADMIN — HEPARIN SODIUM 5000 UNIT(S): 5000 INJECTION INTRAVENOUS; SUBCUTANEOUS at 06:48

## 2019-07-25 RX ADMIN — PANTOPRAZOLE SODIUM 40 MILLIGRAM(S): 20 TABLET, DELAYED RELEASE ORAL at 06:48

## 2019-07-25 RX ADMIN — LATANOPROST 1 DROP(S): 0.05 SOLUTION/ DROPS OPHTHALMIC; TOPICAL at 22:49

## 2019-07-25 RX ADMIN — Medication 500 MILLIGRAM(S): at 06:48

## 2019-07-25 RX ADMIN — Medication 500 MILLIGRAM(S): at 17:09

## 2019-07-25 RX ADMIN — ATORVASTATIN CALCIUM 80 MILLIGRAM(S): 80 TABLET, FILM COATED ORAL at 22:48

## 2019-07-25 RX ADMIN — Medication 1 TABLET(S): at 11:38

## 2019-07-25 RX ADMIN — Medication 1 DROP(S): at 17:10

## 2019-07-25 RX ADMIN — HEPARIN SODIUM 5000 UNIT(S): 5000 INJECTION INTRAVENOUS; SUBCUTANEOUS at 17:12

## 2019-07-25 RX ADMIN — QUETIAPINE FUMARATE 25 MILLIGRAM(S): 200 TABLET, FILM COATED ORAL at 22:48

## 2019-07-25 RX ADMIN — CARBIDOPA AND LEVODOPA 1 TABLET(S): 25; 100 TABLET ORAL at 06:48

## 2019-07-25 RX ADMIN — CARBIDOPA AND LEVODOPA 1 TABLET(S): 25; 100 TABLET ORAL at 22:48

## 2019-07-25 RX ADMIN — CARBIDOPA AND LEVODOPA 1 TABLET(S): 25; 100 TABLET ORAL at 13:23

## 2019-07-25 RX ADMIN — Medication 3 MILLIGRAM(S): at 22:48

## 2019-07-25 RX ADMIN — PANTOPRAZOLE SODIUM 40 MILLIGRAM(S): 20 TABLET, DELAYED RELEASE ORAL at 17:09

## 2019-07-25 RX ADMIN — Medication 1 DROP(S): at 06:48

## 2019-07-25 NOTE — PROGRESS NOTE ADULT - PROBLEM SELECTOR PLAN 1
CHRONIC KIDNEY DISEASE, STAGE 3: increase water intake , continue with iv fluid   Serum creatinine is stable at 2, approximating a GFR of is increased   There is no progression.  No uremic symptoms. No evidence of  worsening  Anemia. Fluid status stable.   Will continue to avoid nephrotoxic drugs.  Patient remains asymptomatic.  Continue current therapy.

## 2019-07-25 NOTE — PROGRESS NOTE ADULT - SUBJECTIVE AND OBJECTIVE BOX
S: Less confused. No distress. Denies chest pain or SOB.      MEDICATIONS  (STANDING):  atorvastatin 80 milliGRAM(s) Oral at bedtime  carbidopa/levodopa  25/100 1 Tablet(s) Oral three times a day  heparin  Injectable 5000 Unit(s) SubCutaneous every 12 hours  lactated ringers. 500 milliLiter(s) (50 mL/Hr) IV Continuous <Continuous>  latanoprost 0.005% Ophthalmic Solution 1 Drop(s) Both EYES at bedtime  melatonin 3 milliGRAM(s) Oral at bedtime  multivitamin 1 Tablet(s) Oral daily  pantoprazole  Injectable 40 milliGRAM(s) IV Push every 12 hours  QUEtiapine 25 milliGRAM(s) Oral at bedtime  sulfaSALAzine 500 milliGRAM(s) Oral two times a day  timolol 0.25% Solution 1 Drop(s) Both EYES two times a day    MEDICATIONS  (PRN):  acetaminophen   Tablet .. 650 milliGRAM(s) Oral every 6 hours PRN Temp greater or equal to 38C (100.4F), Mild Pain (1 - 3), Moderate Pain (4 - 6)      LABS:                            11.1   6.37  )-----------( 249      ( 25 Jul 2019 07:57 )             35.2     Hemoglobin: 11.1 g/dL (07-25 @ 07:57)  Hemoglobin: 10.4 g/dL (07-24 @ 10:08)  Hemoglobin: 10.0 g/dL (07-23 @ 08:45)  Hemoglobin: 10.5 g/dL (07-22 @ 19:43)  Hemoglobin: 9.9 g/dL (07-22 @ 08:42)    07-25    144  |  107  |  21  ----------------------------<  108<H>  3.6   |  21<L>  |  2.07<H>    Ca    9.1      25 Jul 2019 05:57      Creatinine Trend: 2.07<--, 1.77<--, 1.64<--, 1.09<--, 1.77<--, 1.74<--           PHYSICAL EXAM  Vital Signs Last 24 Hrs  T(C): 36.7 (25 Jul 2019 14:08), Max: 36.9 (24 Jul 2019 21:14)  T(F): 98.1 (25 Jul 2019 14:08), Max: 98.5 (24 Jul 2019 21:14)  HR: 60 (25 Jul 2019 14:08) (56 - 72)  BP: 99/63 (25 Jul 2019 14:08) (99/63 - 164/70)  BP(mean): --  RR: 17 (25 Jul 2019 14:08) (17 - 19)  SpO2: 96% (25 Jul 2019 14:08) (96% - 98%)    Gen: Appears well in NAD  HEENT:  (-)icterus (-)pallor  CV: N S1 S2 1/6 CIARRA (+)2 Pulses B/l  Resp:  Clear to auscultation B/L, normal effort  GI: (+) BS Soft, NT, ND  Lymph:  (-)Edema, (-)obvious lymphadenopathy  Skin: Warm to touch, Normal turgor  Psych: Appropriate mood and affect      TELEMETRY: SB/SR 50-80, 3.6 and 4.2 sec pauses this AM    ECG:  < from: 12 Lead ECG (07.16.19 @ 18:01) >  Diagnosis Line Normal sinus rhythm  Right bundle branch block  Possible Inferior infarct , age undetermined  Abnormal ECG  When compared with ECG of 15-JUL-2019 06:43,  No significant change was found    < end of copied text >    < from: TTE with Doppler (w/Cont) (07.23.19 @ 18:39) >  Conclusions:  1. Mitral annular calcification and calcified mitral  leaflets with normal diastolic opening.  2. Calcified trileaflet aortic valve with normal opening.  3. Normal left ventricular internal dimensions and wall  thicknesses.  4. Despite the use of ultrasound enhancing agent, the  endocardium is not well visualized; grossly normal left  ventricular systolic function. Unable to accurately assess  for wall motion abnormalities.  5. Normal diastolic function  6. The right ventricle is not well visualized; grossly  normal right ventricular systolic function.  *** No previous Echo exam.    < end of copied text >    < from: CT Head No Cont (07.23.19 @ 16:48) >  IMPRESSION:  Age appropriate involutional change. Microvascular ischemic   change    < end of copied text >      ASSESSMENT/PLAN: 85 yo M with history of HTN, CKD, UC, Parkinson's disease who is being seen for elevated troponin.    - Patient with no chest pain or anginal symptoms    - suspect elevated troponin are due to demand ischemia from GIB and renal disease  - TTE noted above - normal LV/RV function  - Continue statin, add ASA 81mg if/when okay with GI, hold BB  - F/u GI  - AMS w/u per primary team  - Sinus pauses noted on tele (started on BB yesterday) - patient asymptomatic, BP stable - Hold AVN blockers, monitor for further episodes  - Will consult EP Dr. Palak Birch PA-C  [facility]  Pager: [phone]

## 2019-07-25 NOTE — PROGRESS NOTE ADULT - ASSESSMENT
Problem/Plan - 1:  ·  Problem: GIB (gastrointestinal bleeding).    - SERIAL CBC  - GI FU   - virtual colonoscopy reviewed    Problem/Plan - 2:  ·  Problem: Troponin level elevated.   - 2/2 to possible  NSTEMI -  - tele monitor  - cards fu     Problem/Plan - 3:  ·  Problem: Abnormal CT of the abdomen.    -  virtual colonoscopy with no colonic mass     Problem/Plan - 4:·  Problem: Anemia.   - monitor cbc  - transfuse PRN      Problem/Plan - 5:  ·  Problem: UC (ulcerative colitis).    - continue home medications  - gi following    - c/w PPI    Problem/Plan - 6:  AMS, r/o  metabolic encephalopathy  urine and urine culture  chest x ray  blood culture x 2 sets  rvp negative  seen by psych

## 2019-07-25 NOTE — PROGRESS NOTE ADULT - ASSESSMENT
83 yo male Acoustic neuroma ,[place] , ,Parkinson Dz, unsteady gait ,  Chronic kidney disease  ,Gout  ,HTN (hypertension)   ,UC (ulcerative colitis) , resident of [facility] brought for eval of SOB and black stools for several days. Found to have anemia and admitted for hemotransfusion and GI workup .Patient had colonoscopy 3 years ago and [place] and had some polyps removed ,he denies any recent hx of GIB or GI workup ,but admits black stools and lightheadedness ,sob ,worsening last few days .Found to have RANJANA elevation and seen by cardiologist Admitted  to telemetry unit for monitoring , send 3 sets of cardiac ensymes to rule out coronary event, obtain ECHO to evaluate LVEF, cardiology consult ,continue current management, O2 supply, anticoagulation plan as per cardiology  ,aggrenox placed on hold until GI clearance will be obtained Nephrology cons called ,IV hydration is administrated ,lisinopril held as per cardiologist recommendation . (15 Jul 2019 08:57)

## 2019-07-25 NOTE — PROGRESS NOTE ADULT - SUBJECTIVE AND OBJECTIVE BOX
Patient is a 84y Male whom presented to the hospital with ckd and pb    pt seen  and examined  in am  , nad , no fever   PAST MEDICAL & SURGICAL HISTORY:  UC (ulcerative colitis)  HTN (hypertension)  UC (ulcerative colitis)  HTN (hypertension)  Gout  Chronic kidney disease  Acoustic neuroma  No significant past surgical history  No significant past surgical history      MEDICATIONS  (STANDING):  atorvastatin 20 milliGRAM(s) Oral at bedtime  carbidopa/levodopa  25/100 1 Tablet(s) Oral three times a day  clonazePAM  Tablet 0.5 milliGRAM(s) Oral at bedtime  docusate sodium 100 milliGRAM(s) Oral two times a day  lactobacillus acidophilus 1 Tablet(s) Oral daily  latanoprost 0.005% Ophthalmic Solution 1 Drop(s) Both EYES at bedtime  multivitamin 1 Tablet(s) Oral daily  pantoprazole  Injectable 40 milliGRAM(s) IV Push every 12 hours  sodium chloride 0.9%. 1000 milliLiter(s) (75 mL/Hr) IV Continuous <Continuous>  sulfaSALAzine 500 milliGRAM(s) Oral two times a day  timolol 0.25% Solution 1 Drop(s) Both EYES two times a day      Allergies    No Known Allergies    Intolerances        SOCIAL HISTORY:  Denies ETOh,Smoking,     FAMILY HISTORY:      REVIEW OF SYSTEMS:    CONSTITUTIONAL: No weakness, fevers or chills  RESPIRATORY: No cough, wheezing, hemoptysis; pos  shortness of breath  CARDIOVASCULAR: No chest pain or palpitations  GASTROINTESTINAL: No abdominal or epigastric pain. No nausea, vomiting,     No diarrhea or constipation. pos  melena                                                                                                                       11.1   6.37  )-----------( 249      ( 2019 07:57 )             35.2       CBC Full  -  ( 2019 07:57 )  WBC Count : 6.37 K/uL  RBC Count : 3.59 M/uL  Hemoglobin : 11.1 g/dL  Hematocrit : 35.2 %  Platelet Count - Automated : 249 K/uL  Mean Cell Volume : 98.1 fl  Mean Cell Hemoglobin : 30.9 pg  Mean Cell Hemoglobin Concentration : 31.5 gm/dL  Auto Neutrophil # : x  Auto Lymphocyte # : x  Auto Monocyte # : x  Auto Eosinophil # : x  Auto Basophil # : x  Auto Neutrophil % : x  Auto Lymphocyte % : x  Auto Monocyte % : x  Auto Eosinophil % : x  Auto Basophil % : x      07-    144  |  107  |  21  ----------------------------<  108<H>  3.6   |  21<L>  |  2.07<H>    Ca    9.1      2019 05:57        CAPILLARY BLOOD GLUCOSE      POCT Blood Glucose.: 202 mg/dL (2019 12:07)  POCT Blood Glucose.: 128 mg/dL (2019 07:58)      Vital Signs Last 24 Hrs  T(C): 36.7 (2019 14:08), Max: 36.9 (2019 21:14)  T(F): 98.1 (2019 14:08), Max: 98.5 (2019 21:14)  HR: 60 (2019 14:08) (56 - 72)  BP: 99/63 (2019 14:08) (99/63 - 164/70)  BP(mean): --  RR: 17 (2019 14:08) (17 - 19)  SpO2: 96% (2019 14:08) (96% - 98%)    Urinalysis Basic - ( 2019 01:25 )    Color: Yellow / Appearance: Clear / S.018 / pH: x  Gluc: x / Ketone: Small  / Bili: Negative / Urobili: <2 mg/dL   Blood: x / Protein: 100 mg/dL / Nitrite: Negative   Leuk Esterase: Negative / RBC: 2 /HPF / WBC 2 /HPF   Sq Epi: x / Non Sq Epi: 4 /HPF / Bacteria: Negative                  PHYSICAL EXAM:    Constitutional: NAD  HEENT: conjunctive   clear   Neck:  No JVD  Respiratory: decrease bs b/l   Cardiovascular: S1 and S2  Gastrointestinal: BS+, soft, NT/ND  Extremities: trace  peripheral edema  Neurological:  no focal deficits  Psychiatric: normal affect  Skin: dry  Access: Not applicable

## 2019-07-25 NOTE — PROGRESS NOTE ADULT - SUBJECTIVE AND OBJECTIVE BOX
Patient is a 84y old  Male who presents with a chief complaint of SOB , anemia ,black stools (2019 22:08)      SUBJECTIVE / OVERNIGHT EVENTS:  MS has improved today    MEDICATIONS  (STANDING):  atorvastatin 80 milliGRAM(s) Oral at bedtime  carbidopa/levodopa  25/100 1 Tablet(s) Oral three times a day  heparin  Injectable 5000 Unit(s) SubCutaneous every 12 hours  lactated ringers. 500 milliLiter(s) (50 mL/Hr) IV Continuous <Continuous>  latanoprost 0.005% Ophthalmic Solution 1 Drop(s) Both EYES at bedtime  melatonin 3 milliGRAM(s) Oral at bedtime  multivitamin 1 Tablet(s) Oral daily  pantoprazole  Injectable 40 milliGRAM(s) IV Push every 12 hours  QUEtiapine 25 milliGRAM(s) Oral at bedtime  sulfaSALAzine 500 milliGRAM(s) Oral two times a day  timolol 0.25% Solution 1 Drop(s) Both EYES two times a day    MEDICATIONS  (PRN):  acetaminophen   Tablet .. 650 milliGRAM(s) Oral every 6 hours PRN Temp greater or equal to 38C (100.4F), Mild Pain (1 - 3), Moderate Pain (4 - 6)      Vital Signs Last 24 Hrs  T(C): 36.7 (2019 04:18), Max: 36.9 (2019 21:14)  T(F): 98.1 (2019 04:18), Max: 98.5 (2019 21:14)  HR: 56 (2019 08:40) (56 - 72)  BP: 150/74 (2019 08:40) (118/61 - 164/70)  BP(mean): --  RR: 19 (2019 08:40) (18 - 19)  SpO2: 97% (2019 08:40) (96% - 98%)  CAPILLARY BLOOD GLUCOSE      POCT Blood Glucose.: 128 mg/dL (2019 07:58)    I&O's Summary    2019 07:01  -  2019 07:00  --------------------------------------------------------  IN: 420 mL / OUT: 100 mL / NET: 320 mL    2019 07:01  -  2019 11:43  --------------------------------------------------------  IN: 500 mL / OUT: 0 mL / NET: 500 mL        PHYSICAL EXAM:  GENERAL: NAD, well-developed  HEAD:  Atraumatic, Normocephalic  EYES: EOMI, PERRLA, conjunctiva and sclera clear  NECK: Supple, No JVD  CHEST/LUNG: Clear to auscultation bilaterally; No wheeze  HEART: Regular rate and rhythm; No murmurs, rubs, or gallops  ABDOMEN: Soft, Nontender, Nondistended; Bowel sounds present  EXTREMITIES:  2+ Peripheral Pulses, No clubbing, cyanosis, or edema  PSYCH: AAOx1 to 2  NEUROLOGY: non-focal  SKIN: No rashes or lesions    LABS:                        11.1   6.37  )-----------( 249      ( 2019 07:57 )             35.2     07-    144  |  107  |  21  ----------------------------<  108<H>  3.6   |  21<L>  |  2.07<H>    Ca    9.1      2019 05:57            Urinalysis Basic - ( 2019 01:25 )    Color: Yellow / Appearance: Clear / S.018 / pH: x  Gluc: x / Ketone: Small  / Bili: Negative / Urobili: <2 mg/dL   Blood: x / Protein: 100 mg/dL / Nitrite: Negative   Leuk Esterase: Negative / RBC: 2 /HPF / WBC 2 /HPF   Sq Epi: x / Non Sq Epi: 4 /HPF / Bacteria: Negative        RADIOLOGY & ADDITIONAL TESTS:    Imaging Personally Reviewed:    Consultant(s) Notes Reviewed:      Care Discussed with Consultants/Other Providers:

## 2019-07-26 LAB
ANION GAP SERPL CALC-SCNC: 17 MMOL/L — SIGNIFICANT CHANGE UP (ref 5–17)
BUN SERPL-MCNC: 36 MG/DL — HIGH (ref 7–23)
CALCIUM SERPL-MCNC: 8.7 MG/DL — SIGNIFICANT CHANGE UP (ref 8.4–10.5)
CHLORIDE SERPL-SCNC: 103 MMOL/L — SIGNIFICANT CHANGE UP (ref 96–108)
CO2 SERPL-SCNC: 21 MMOL/L — LOW (ref 22–31)
CREAT SERPL-MCNC: 2.88 MG/DL — HIGH (ref 0.5–1.3)
GLUCOSE SERPL-MCNC: 126 MG/DL — HIGH (ref 70–99)
MAGNESIUM SERPL-MCNC: 1.7 MG/DL — SIGNIFICANT CHANGE UP (ref 1.6–2.6)
POTASSIUM SERPL-MCNC: 3.4 MMOL/L — LOW (ref 3.5–5.3)
POTASSIUM SERPL-SCNC: 3.4 MMOL/L — LOW (ref 3.5–5.3)
SODIUM SERPL-SCNC: 141 MMOL/L — SIGNIFICANT CHANGE UP (ref 135–145)

## 2019-07-26 RX ORDER — POTASSIUM CHLORIDE 20 MEQ
20 PACKET (EA) ORAL ONCE
Refills: 0 | Status: COMPLETED | OUTPATIENT
Start: 2019-07-26 | End: 2019-07-26

## 2019-07-26 RX ORDER — SODIUM CHLORIDE 9 MG/ML
1000 INJECTION INTRAMUSCULAR; INTRAVENOUS; SUBCUTANEOUS
Refills: 0 | Status: DISCONTINUED | OUTPATIENT
Start: 2019-07-26 | End: 2019-07-27

## 2019-07-26 RX ADMIN — Medication 1 TABLET(S): at 11:53

## 2019-07-26 RX ADMIN — Medication 3 MILLIGRAM(S): at 22:08

## 2019-07-26 RX ADMIN — HEPARIN SODIUM 5000 UNIT(S): 5000 INJECTION INTRAVENOUS; SUBCUTANEOUS at 17:12

## 2019-07-26 RX ADMIN — Medication 20 MILLIEQUIVALENT(S): at 17:05

## 2019-07-26 RX ADMIN — SODIUM CHLORIDE 75 MILLILITER(S): 9 INJECTION INTRAMUSCULAR; INTRAVENOUS; SUBCUTANEOUS at 17:09

## 2019-07-26 RX ADMIN — CARBIDOPA AND LEVODOPA 1 TABLET(S): 25; 100 TABLET ORAL at 13:14

## 2019-07-26 RX ADMIN — Medication 1 DROP(S): at 17:12

## 2019-07-26 RX ADMIN — CARBIDOPA AND LEVODOPA 1 TABLET(S): 25; 100 TABLET ORAL at 22:08

## 2019-07-26 RX ADMIN — Medication 1 DROP(S): at 06:43

## 2019-07-26 RX ADMIN — HEPARIN SODIUM 5000 UNIT(S): 5000 INJECTION INTRAVENOUS; SUBCUTANEOUS at 06:43

## 2019-07-26 RX ADMIN — Medication 500 MILLIGRAM(S): at 06:44

## 2019-07-26 RX ADMIN — CARBIDOPA AND LEVODOPA 1 TABLET(S): 25; 100 TABLET ORAL at 06:44

## 2019-07-26 RX ADMIN — QUETIAPINE FUMARATE 25 MILLIGRAM(S): 200 TABLET, FILM COATED ORAL at 22:08

## 2019-07-26 RX ADMIN — LATANOPROST 1 DROP(S): 0.05 SOLUTION/ DROPS OPHTHALMIC; TOPICAL at 22:08

## 2019-07-26 RX ADMIN — PANTOPRAZOLE SODIUM 40 MILLIGRAM(S): 20 TABLET, DELAYED RELEASE ORAL at 06:43

## 2019-07-26 NOTE — PROGRESS NOTE ADULT - SUBJECTIVE AND OBJECTIVE BOX
S: No distress. Denies chest pain or SOB.      MEDICATIONS  (STANDING):  atorvastatin 80 milliGRAM(s) Oral at bedtime  carbidopa/levodopa  25/100 1 Tablet(s) Oral three times a day  heparin  Injectable 5000 Unit(s) SubCutaneous every 12 hours  lactated ringers. 500 milliLiter(s) (50 mL/Hr) IV Continuous <Continuous>  latanoprost 0.005% Ophthalmic Solution 1 Drop(s) Both EYES at bedtime  melatonin 3 milliGRAM(s) Oral at bedtime  multivitamin 1 Tablet(s) Oral daily  pantoprazole  Injectable 40 milliGRAM(s) IV Push every 12 hours  QUEtiapine 25 milliGRAM(s) Oral at bedtime  sulfaSALAzine 500 milliGRAM(s) Oral two times a day  timolol 0.25% Solution 1 Drop(s) Both EYES two times a day    MEDICATIONS  (PRN):  acetaminophen   Tablet .. 650 milliGRAM(s) Oral every 6 hours PRN Temp greater or equal to 38C (100.4F), Mild Pain (1 - 3), Moderate Pain (4 - 6)      LABS:                            11.1   6.37  )-----------( 249      ( 25 Jul 2019 07:57 )             35.2     Hemoglobin: 11.1 g/dL (07-25 @ 07:57)  Hemoglobin: 10.4 g/dL (07-24 @ 10:08)  Hemoglobin: 10.0 g/dL (07-23 @ 08:45)  Hemoglobin: 10.5 g/dL (07-22 @ 19:43)  Hemoglobin: 9.9 g/dL (07-22 @ 08:42)    07-26    141  |  103  |  36<H>  ----------------------------<  126<H>  3.4<L>   |  21<L>  |  2.88<H>    Ca    8.7      26 Jul 2019 06:15  Mg     1.7     07-26      Creatinine Trend: 2.88<--, 2.07<--, 1.77<--, 1.64<--, 1.09<--, 1.77<--           PHYSICAL EXAM  Vital Signs Last 24 Hrs  T(C): 36.9 (26 Jul 2019 11:24), Max: 36.9 (25 Jul 2019 20:40)  T(F): 98.4 (26 Jul 2019 11:24), Max: 98.4 (25 Jul 2019 20:40)  HR: 60 (26 Jul 2019 11:24) (50 - 60)  BP: 120/60 (26 Jul 2019 11:24) (99/63 - 148/70)  BP(mean): --  RR: 17 (26 Jul 2019 11:24) (17 - 18)  SpO2: 98% (26 Jul 2019 11:24) (96% - 98%)      Gen: Appears well in NAD  HEENT:  (-)icterus (-)pallor  CV: N S1 S2 1/6 CIARRA (+)2 Pulses B/l  Resp:  Clear to auscultation B/L, normal effort  GI: (+) BS Soft, NT, ND  Lymph:  (-)Edema, (-)obvious lymphadenopathy  Skin: Warm to touch, Normal turgor  Psych: Appropriate mood and affect      TELEMETRY: SB/SR 50-70    ECG:  < from: 12 Lead ECG (07.16.19 @ 18:01) >  Diagnosis Line Normal sinus rhythm  Right bundle branch block  Possible Inferior infarct , age undetermined  Abnormal ECG  When compared with ECG of 15-JUL-2019 06:43,  No significant change was found    < end of copied text >    < from: TTE with Doppler (w/Cont) (07.23.19 @ 18:39) >  Conclusions:  1. Mitral annular calcification and calcified mitral  leaflets with normal diastolic opening.  2. Calcified trileaflet aortic valve with normal opening.  3. Normal left ventricular internal dimensions and wall  thicknesses.  4. Despite the use of ultrasound enhancing agent, the  endocardium is not well visualized; grossly normal left  ventricular systolic function. Unable to accurately assess  for wall motion abnormalities.  5. Normal diastolic function  6. The right ventricle is not well visualized; grossly  normal right ventricular systolic function.  *** No previous Echo exam.    < end of copied text >    < from: CT Head No Cont (07.23.19 @ 16:48) >  IMPRESSION:  Age appropriate involutional change. Microvascular ischemic   change    < end of copied text >      ASSESSMENT/PLAN: 85 yo M with history of HTN, CKD, UC, Parkinson's disease who is being seen for elevated troponin.    - Patient with no chest pain or anginal symptoms    - suspect elevated troponin are due to demand ischemia from GIB and renal disease  - TTE noted above - normal LV/RV function  - Continue statin, add ASA 81mg if/when okay with GI, hold BB  - F/u GI  - AMS w/u per primary team  - Sinus pauses noted on tele 7/25 - patient asymptomatic, BP stable - Hold AVN blockers, monitor for further episodes  - F/u EP consult with Dr. Cid  - Will setup patient to follow up in our office upon discharge    Arnoldo Birch PA-C  [facility]  Pager: [phone]

## 2019-07-26 NOTE — CONSULT NOTE ADULT - SUBJECTIVE AND OBJECTIVE BOX
EP ATTENDING      HISTORY OF PRESENT ILLNESS: He is a pleasant 83 y/o male admitted almost two weeks ago with melena. EP is called because yesterday he had asymptomatic 3-4.5 second sinus pauses on tele yesterday after a beta blocker was started for a NSTEMI. His LVEF and TSH are normal. His QRS is narrow. He denies syncope nor presyncope.    PAST MEDICAL & SURGICAL HISTORY:  UC (ulcerative colitis)  HTN (hypertension)  Gout  Chronic kidney disease  Acoustic neuroma    No significant past surgical history    MEDICATIONS  (STANDING):  atorvastatin 80 milliGRAM(s) Oral at bedtime  carbidopa/levodopa  25/100 1 Tablet(s) Oral three times a day  heparin  Injectable 5000 Unit(s) SubCutaneous every 12 hours  lactated ringers. 500 milliLiter(s) (50 mL/Hr) IV Continuous <Continuous>  latanoprost 0.005% Ophthalmic Solution 1 Drop(s) Both EYES at bedtime  melatonin 3 milliGRAM(s) Oral at bedtime  multivitamin 1 Tablet(s) Oral daily  pantoprazole  Injectable 40 milliGRAM(s) IV Push every 12 hours  QUEtiapine 25 milliGRAM(s) Oral at bedtime  sulfaSALAzine 500 milliGRAM(s) Oral two times a day  timolol 0.25% Solution 1 Drop(s) Both EYES two times a day    NKDA    FAMILY HISTORY:    Non-contributary for premature coronary disease or sudden cardiac death    SOCIAL HISTORY:    [x ] Non-smoker  [ ] Smoker  [ ] Alcohol      REVIEW OF SYSTEMS:  [ ]chest pain  [  ]shortness of breath  [  ]palpitations  [  ]syncope  [ ]near syncope [ ]upper extremity weakness   [ ] lower extremity weakness  [  ]diplopia  [  ]altered mental status   [  ]fevers  [ ]chills [ ]nausea  [ ]vomitting  [  ]dysphagia    [ ]abdominal pain  [ ]melena  [ ]BRBPR    [  ]epistaxis  [  ]rash    [ ]lower extremity edema        [x ] All others negative	  [ ] Unable to obtain    PHYSICAL EXAM:  T(C): 36.9 (07-26-19 @ 11:24), Max: 36.9 (07-25-19 @ 20:40)  HR: 60 (07-26-19 @ 11:24) (50 - 60)  BP: 120/60 (07-26-19 @ 11:24) (99/63 - 148/70)  RR: 17 (07-26-19 @ 11:24) (17 - 18)  SpO2: 98% (07-26-19 @ 11:24) (96% - 98%)  Wt(kg): --    Appearance: Normal	  no JVD  RRR, no murmurs  CTAB  soft nt/nd  no c/c/e    TELEMETRY: 	    ECG:  	    Echo:  NST:  Cath:  	  	  LABS:	 	                          11.1   6.37  )-----------( 249      ( 25 Jul 2019 07:57 )             35.2     07-26    141  |  103  |  36<H>  ----------------------------<  126<H>  3.4<L>   |  21<L>  |  2.88<H>    Ca    8.7      26 Jul 2019 06:15  Mg     1.7     07-26      proBNP:   Lipid Profile:   HgA1c:   TSH:     A/P) He is a pleasant 83 y/o male admitted almost two weeks ago with melena. EP is called because yesterday he had asymptomatic 3-4.5 second sinus pauses on tele yesterday after a beta blocker was started for a NSTEMI. His LVEF and TSH are normal. His QRS is narrow. He denies syncope nor presyncope.    -d/c beta blocker  -will observe tele for a malignant pause  -no need for PPM at this time as patient asymptomatic and the etiology of the bradycardia is reversible      Lupe Cid M.D., [facility]  Cardiac Electrophysiology  [facility]  [address] [place] [address], -[address] [place] [address]  [email or web address]    office [phone]  pager [phone]

## 2019-07-26 NOTE — PROGRESS NOTE ADULT - SUBJECTIVE AND OBJECTIVE BOX
Patient is a 84y Male whom presented to the hospital with ckd and pb    pt seen  and examined  in am  , nad , no fever   PAST MEDICAL & SURGICAL HISTORY:  UC (ulcerative colitis)  HTN (hypertension)  UC (ulcerative colitis)  HTN (hypertension)  Gout  Chronic kidney disease  Acoustic neuroma  No significant past surgical history  No significant past surgical history      MEDICATIONS  (STANDING):  atorvastatin 20 milliGRAM(s) Oral at bedtime  carbidopa/levodopa  25/100 1 Tablet(s) Oral three times a day  clonazePAM  Tablet 0.5 milliGRAM(s) Oral at bedtime  docusate sodium 100 milliGRAM(s) Oral two times a day  lactobacillus acidophilus 1 Tablet(s) Oral daily  latanoprost 0.005% Ophthalmic Solution 1 Drop(s) Both EYES at bedtime  multivitamin 1 Tablet(s) Oral daily  pantoprazole  Injectable 40 milliGRAM(s) IV Push every 12 hours  sodium chloride 0.9%. 1000 milliLiter(s) (75 mL/Hr) IV Continuous <Continuous>  sulfaSALAzine 500 milliGRAM(s) Oral two times a day  timolol 0.25% Solution 1 Drop(s) Both EYES two times a day      Allergies    No Known Allergies    Intolerances        SOCIAL HISTORY:  Denies ETOh,Smoking,     FAMILY HISTORY:      REVIEW OF SYSTEMS:    CONSTITUTIONAL: No weakness, fevers or chills  RESPIRATORY: No cough, wheezing, hemoptysis; pos  shortness of breath  CARDIOVASCULAR: No chest pain or palpitations  GASTROINTESTINAL: No abdominal or epigastric pain. No nausea, vomiting,     No diarrhea or constipation. pos  melena                                             11.1   6.37  )-----------( 249      ( 2019 07:57 )             35.2       CBC Full  -  ( 2019 07:57 )  WBC Count : 6.37 K/uL  RBC Count : 3.59 M/uL  Hemoglobin : 11.1 g/dL  Hematocrit : 35.2 %  Platelet Count - Automated : 249 K/uL  Mean Cell Volume : 98.1 fl  Mean Cell Hemoglobin : 30.9 pg  Mean Cell Hemoglobin Concentration : 31.5 gm/dL  Auto Neutrophil # : x  Auto Lymphocyte # : x  Auto Monocyte # : x  Auto Eosinophil # : x  Auto Basophil # : x  Auto Neutrophil % : x  Auto Lymphocyte % : x  Auto Monocyte % : x  Auto Eosinophil % : x  Auto Basophil % : x      07-26    141  |  103  |  36<H>  ----------------------------<  126<H>  3.4<L>   |  21<L>  |  2.88<H>    Ca    8.7      2019 06:15  Mg     1.7     07-26        CAPILLARY BLOOD GLUCOSE          Vital Signs Last 24 Hrs  T(C): 36.9 (2019 11:24), Max: 36.9 (2019 20:40)  T(F): 98.4 (2019 11:24), Max: 98.4 (2019 20:40)  HR: 60 (2019 11:24) (50 - 60)  BP: 120/60 (2019 11:24) (120/60 - 148/70)  BP(mean): --  RR: 17 (2019 11:24) (17 - 18)  SpO2: 98% (2019 11:24) (97% - 98%)    Urinalysis Basic - ( 2019 01:25 )    Color: Yellow / Appearance: Clear / S.018 / pH: x  Gluc: x / Ketone: Small  / Bili: Negative / Urobili: <2 mg/dL   Blood: x / Protein: 100 mg/dL / Nitrite: Negative   Leuk Esterase: Negative / RBC: 2 /HPF / WBC 2 /HPF   Sq Epi: x / Non Sq Epi: 4 /HPF / Bacteria: Negative                      PHYSICAL EXAM:    Constitutional: NAD  HEENT: conjunctive   clear   Neck:  No JVD  Respiratory: decrease bs b/l   Cardiovascular: S1 and S2  Gastrointestinal: BS+, soft, NT/ND  Extremities: trace  peripheral edema  Neurological:  no focal deficits  Psychiatric: normal affect  Skin: dry  Access: Not applicable

## 2019-07-26 NOTE — PROGRESS NOTE ADULT - PROBLEM SELECTOR PLAN 1
CHRONIC KIDNEY DISEASE, STAGE 3: gfr is decreased dc nephrotoxin med and start iv fluid will get us kidney     increase water intake , continue with iv fluid   Serum creatinine is stable at 2.7 , approximating a GFR of is increased   There is no progression.  No uremic symptoms. No evidence of  worsening  Anemia. Fluid status stable.   Will continue to avoid nephrotoxic drugs.  Patient remains asymptomatic.

## 2019-07-26 NOTE — PROGRESS NOTE ADULT - SUBJECTIVE AND OBJECTIVE BOX
Patient is a 84y old  Male who presents with a chief complaint of SOB , anemia ,black stools (2019 12:37)      SUBJECTIVE / OVERNIGHT EVENTS:  MS has significantly improved.  had sinus pause after he was started on lopressor.  spoke with son at bedside.     MEDICATIONS  (STANDING):  atorvastatin 80 milliGRAM(s) Oral at bedtime  carbidopa/levodopa  25/100 1 Tablet(s) Oral three times a day  heparin  Injectable 5000 Unit(s) SubCutaneous every 12 hours  lactated ringers. 500 milliLiter(s) (50 mL/Hr) IV Continuous <Continuous>  latanoprost 0.005% Ophthalmic Solution 1 Drop(s) Both EYES at bedtime  melatonin 3 milliGRAM(s) Oral at bedtime  multivitamin 1 Tablet(s) Oral daily  pantoprazole  Injectable 40 milliGRAM(s) IV Push every 12 hours  QUEtiapine 25 milliGRAM(s) Oral at bedtime  sulfaSALAzine 500 milliGRAM(s) Oral two times a day  timolol 0.25% Solution 1 Drop(s) Both EYES two times a day    MEDICATIONS  (PRN):  acetaminophen   Tablet .. 650 milliGRAM(s) Oral every 6 hours PRN Temp greater or equal to 38C (100.4F), Mild Pain (1 - 3), Moderate Pain (4 - 6)      Vital Signs Last 24 Hrs  T(C): 36.9 (2019 11:24), Max: 36.9 (2019 20:40)  T(F): 98.4 (2019 11:24), Max: 98.4 (2019 20:40)  HR: 60 (2019 11:24) (50 - 60)  BP: 120/60 (2019 11:24) (99/63 - 148/70)  BP(mean): --  RR: 17 (2019 11:24) (17 - 18)  SpO2: 98% (2019 11:24) (96% - 98%)  CAPILLARY BLOOD GLUCOSE        I&O's Summary    2019 07:01  -  2019 07:00  --------------------------------------------------------  IN: 1220 mL / OUT: 300 mL / NET: 920 mL        PHYSICAL EXAM:  GENERAL: NAD, well-developed  HEAD:  Atraumatic, Normocephalic  EYES: EOMI, PERRLA, conjunctiva and sclera clear  NECK: Supple, No JVD  CHEST/LUNG: Clear to auscultation bilaterally; No wheeze  HEART: Regular rate and rhythm; No murmurs, rubs, or gallops  ABDOMEN: Soft, Nontender, Nondistended; Bowel sounds present  EXTREMITIES:  2+ Peripheral Pulses, No clubbing, cyanosis, or edema  PSYCH: AAOx3  NEUROLOGY: non-focal  SKIN: No rashes or lesions    LABS:                        11.1   6.37  )-----------( 249      ( 2019 07:57 )             35.2     07-    141  |  103  |  36<H>  ----------------------------<  126<H>  3.4<L>   |  21<L>  |  2.88<H>    Ca    8.7      2019 06:15  Mg     1.7                 Urinalysis Basic - ( 2019 01:25 )    Color: Yellow / Appearance: Clear / S.018 / pH: x  Gluc: x / Ketone: Small  / Bili: Negative / Urobili: <2 mg/dL   Blood: x / Protein: 100 mg/dL / Nitrite: Negative   Leuk Esterase: Negative / RBC: 2 /HPF / WBC 2 /HPF   Sq Epi: x / Non Sq Epi: 4 /HPF / Bacteria: Negative        RADIOLOGY & ADDITIONAL TESTS:    Imaging Personally Reviewed:    Consultant(s) Notes Reviewed:      Care Discussed with Consultants/Other Providers:

## 2019-07-26 NOTE — PROGRESS NOTE ADULT - ASSESSMENT
Problem/Plan - 1:  ·  Problem: GIB (gastrointestinal bleeding).    - SERIAL CBC  - GI FU   - virtual colonoscopy reviewed    Problem/Plan - 2:  ·  Problem: Troponin level elevated.   - 2/2 to possible  NSTEMI -  - tele monitor  - cards fu     Problem/Plan - 3:  ·  Problem: Abnormal CT of the abdomen.    -  virtual colonoscopy with no colonic mass     Problem/Plan - 4:·  Problem: Anemia.   - monitor cbc  - transfuse PRN      Problem/Plan - 5:  ·  Problem: UC (ulcerative colitis).    - continue home medications  - gi following    - c/w PPI    Problem/Plan - 6:  AMS, resolved  urine and urine culture  chest x ray  blood culture x 2 sets  rvp  - w/u negative  - pt responded to seroquel    Problem/Plan - 7:  sinus pause  maria alejandra d/c'ed  seen by EP  no PPM at this time    d/w pt and son in great detail

## 2019-07-27 DIAGNOSIS — Z71.89 OTHER SPECIFIED COUNSELING: ICD-10-CM

## 2019-07-27 LAB
ANION GAP SERPL CALC-SCNC: 17 MMOL/L — SIGNIFICANT CHANGE UP (ref 5–17)
BUN SERPL-MCNC: 36 MG/DL — HIGH (ref 7–23)
CALCIUM SERPL-MCNC: 8.4 MG/DL — SIGNIFICANT CHANGE UP (ref 8.4–10.5)
CHLORIDE SERPL-SCNC: 106 MMOL/L — SIGNIFICANT CHANGE UP (ref 96–108)
CO2 SERPL-SCNC: 17 MMOL/L — LOW (ref 22–31)
CREAT SERPL-MCNC: 2.17 MG/DL — HIGH (ref 0.5–1.3)
GLUCOSE SERPL-MCNC: 119 MG/DL — HIGH (ref 70–99)
POTASSIUM SERPL-MCNC: 3.8 MMOL/L — SIGNIFICANT CHANGE UP (ref 3.5–5.3)
POTASSIUM SERPL-SCNC: 3.8 MMOL/L — SIGNIFICANT CHANGE UP (ref 3.5–5.3)
SODIUM SERPL-SCNC: 140 MMOL/L — SIGNIFICANT CHANGE UP (ref 135–145)

## 2019-07-27 RX ORDER — SODIUM CHLORIDE 9 MG/ML
1000 INJECTION INTRAMUSCULAR; INTRAVENOUS; SUBCUTANEOUS
Refills: 0 | Status: DISCONTINUED | OUTPATIENT
Start: 2019-07-27 | End: 2019-07-29

## 2019-07-27 RX ORDER — SODIUM BICARBONATE 1 MEQ/ML
650 SYRINGE (ML) INTRAVENOUS
Refills: 0 | Status: COMPLETED | OUTPATIENT
Start: 2019-07-27 | End: 2019-07-30

## 2019-07-27 RX ADMIN — HEPARIN SODIUM 5000 UNIT(S): 5000 INJECTION INTRAVENOUS; SUBCUTANEOUS at 05:53

## 2019-07-27 RX ADMIN — Medication 650 MILLIGRAM(S): at 17:12

## 2019-07-27 RX ADMIN — CARBIDOPA AND LEVODOPA 1 TABLET(S): 25; 100 TABLET ORAL at 05:53

## 2019-07-27 RX ADMIN — Medication 1 DROP(S): at 17:08

## 2019-07-27 RX ADMIN — Medication 3 MILLIGRAM(S): at 21:52

## 2019-07-27 RX ADMIN — HEPARIN SODIUM 5000 UNIT(S): 5000 INJECTION INTRAVENOUS; SUBCUTANEOUS at 17:08

## 2019-07-27 RX ADMIN — CARBIDOPA AND LEVODOPA 1 TABLET(S): 25; 100 TABLET ORAL at 13:01

## 2019-07-27 RX ADMIN — CARBIDOPA AND LEVODOPA 1 TABLET(S): 25; 100 TABLET ORAL at 23:21

## 2019-07-27 RX ADMIN — LATANOPROST 1 DROP(S): 0.05 SOLUTION/ DROPS OPHTHALMIC; TOPICAL at 21:52

## 2019-07-27 RX ADMIN — Medication 1 DROP(S): at 05:53

## 2019-07-27 RX ADMIN — QUETIAPINE FUMARATE 25 MILLIGRAM(S): 200 TABLET, FILM COATED ORAL at 21:53

## 2019-07-27 NOTE — PROGRESS NOTE ADULT - SUBJECTIVE AND OBJECTIVE BOX
pt seen and examined, no complaints, ROS - .     acetaminophen   Tablet .. 650 milliGRAM(s) Oral every 6 hours PRN  carbidopa/levodopa  25/100 1 Tablet(s) Oral three times a day  heparin  Injectable 5000 Unit(s) SubCutaneous every 12 hours  latanoprost 0.005% Ophthalmic Solution 1 Drop(s) Both EYES at bedtime  melatonin 3 milliGRAM(s) Oral at bedtime  QUEtiapine 25 milliGRAM(s) Oral at bedtime  sodium chloride 0.9%. 1000 milliLiter(s) IV Continuous <Continuous>  timolol 0.25% Solution 1 Drop(s) Both EYES two times a day                            11.1   6.37  )-----------( 249      ( 25 Jul 2019 07:57 )             35.2       Hemoglobin: 11.1 g/dL (07-25 @ 07:57)  Hemoglobin: 10.4 g/dL (07-24 @ 10:08)  Hemoglobin: 10.0 g/dL (07-23 @ 08:45)  Hemoglobin: 10.5 g/dL (07-22 @ 19:43)  Hemoglobin: 9.9 g/dL (07-22 @ 08:42)      07-26    141  |  103  |  36<H>  ----------------------------<  126<H>  3.4<L>   |  21<L>  |  2.88<H>    Ca    8.7      26 Jul 2019 06:15  Mg     1.7     07-26      Creatinine Trend: 2.88<--, 2.07<--, 1.77<--, 1.64<--, 1.09<--, 1.77<--    COAGS:           T(C): 36.7 (07-27-19 @ 05:56), Max: 36.9 (07-26-19 @ 11:24)  HR: 55 (07-27-19 @ 05:56) (55 - 61)  BP: 143/66 (07-27-19 @ 05:56) (120/60 - 157/73)  RR: 18 (07-27-19 @ 05:56) (17 - 18)  SpO2: 96% (07-27-19 @ 05:56) (96% - 99%)  Wt(kg): --    I&O's Summary    25 Jul 2019 07:01  -  26 Jul 2019 07:00  --------------------------------------------------------  IN: 1220 mL / OUT: 300 mL / NET: 920 mL    26 Jul 2019 07:01  -  27 Jul 2019 06:22  --------------------------------------------------------  IN: 300 mL / OUT: 0 mL / NET: 300 mL          Gen: Appears well in NAD  HEENT:  (-)icterus (-)pallor  CV: N S1 S2 1/6 CIARRA (+)2 Pulses B/l  Resp:  Clear to auscultation B/L, normal effort  GI: (+) BS Soft, NT, ND  Lymph:  (-)Edema, (-)obvious lymphadenopathy  Skin: Warm to touch, Normal turgor  Psych: Appropriate mood and affect      TELEMETRY: SB/SR 50-70    ECG:  < from: 12 Lead ECG (07.16.19 @ 18:01) >  Diagnosis Line Normal sinus rhythm  Right bundle branch block  Possible Inferior infarct , age undetermined  Abnormal ECG  When compared with ECG of 15-JUL-2019 06:43,  No significant change was found    < end of copied text >    < from: TTE with Doppler (w/Cont) (07.23.19 @ 18:39) >  Conclusions:  1. Mitral annular calcification and calcified mitral  leaflets with normal diastolic opening.  2. Calcified trileaflet aortic valve with normal opening.  3. Normal left ventricular internal dimensions and wall  thicknesses.  4. Despite the use of ultrasound enhancing agent, the  endocardium is not well visualized; grossly normal left  ventricular systolic function. Unable to accurately assess  for wall motion abnormalities.  5. Normal diastolic function  6. The right ventricle is not well visualized; grossly  normal right ventricular systolic function.  *** No previous Echo exam.    < end of copied text >    < from: CT Head No Cont (07.23.19 @ 16:48) >  IMPRESSION:  Age appropriate involutional change. Microvascular ischemic   change    < end of copied text >      ASSESSMENT/PLAN: 83 yo M with history of HTN, CKD, UC, Parkinson's disease who is being seen for elevated troponin.    -  GI / DVT prophylaxis,  keep K>4, mag >2.0  - Patient with no chest pain or anginal symptoms    - cont seroquel   - cont all parkinson meds  - TTE noted above - normal LV/RV function   -will observe tele for a malignant pause  -no need for PPM at this time as patient asymptomatic and the etiology of the bradycardia is reversible  D/W Dr Cid

## 2019-07-27 NOTE — PROGRESS NOTE ADULT - SUBJECTIVE AND OBJECTIVE BOX
Patient is a 84y old  Male who presents with a chief complaint of SOB , anemia ,black stools (27 Jul 2019 10:23)      SUBJECTIVE / OVERNIGHT EVENTS:  No chest pain. No shortness of breath. No complaints. No events overnight.     MEDICATIONS  (STANDING):  carbidopa/levodopa  25/100 1 Tablet(s) Oral three times a day  heparin  Injectable 5000 Unit(s) SubCutaneous every 12 hours  latanoprost 0.005% Ophthalmic Solution 1 Drop(s) Both EYES at bedtime  melatonin 3 milliGRAM(s) Oral at bedtime  QUEtiapine 25 milliGRAM(s) Oral at bedtime  sodium chloride 0.9%. 1000 milliLiter(s) (75 mL/Hr) IV Continuous <Continuous>  timolol 0.25% Solution 1 Drop(s) Both EYES two times a day    MEDICATIONS  (PRN):  acetaminophen   Tablet .. 650 milliGRAM(s) Oral every 6 hours PRN Temp greater or equal to 38C (100.4F), Mild Pain (1 - 3), Moderate Pain (4 - 6)      Vital Signs Last 24 Hrs  T(C): 36.7 (27 Jul 2019 05:56), Max: 36.9 (26 Jul 2019 20:28)  T(F): 98.1 (27 Jul 2019 05:56), Max: 98.5 (26 Jul 2019 20:28)  HR: 55 (27 Jul 2019 05:56) (55 - 61)  BP: 143/66 (27 Jul 2019 05:56) (143/66 - 157/73)  BP(mean): --  RR: 18 (27 Jul 2019 05:56) (18 - 18)  SpO2: 96% (27 Jul 2019 05:56) (96% - 99%)  CAPILLARY BLOOD GLUCOSE        I&O's Summary    26 Jul 2019 07:01  -  27 Jul 2019 07:00  --------------------------------------------------------  IN: 300 mL / OUT: 0 mL / NET: 300 mL        PHYSICAL EXAM:  GENERAL: NAD, well-developed  HEAD:  Atraumatic, Normocephalic  EYES: EOMI, PERRLA, conjunctiva and sclera clear  NECK: Supple, No JVD  CHEST/LUNG: Clear to auscultation bilaterally; No wheeze  HEART: Regular rate and rhythm; No murmurs, rubs, or gallops  ABDOMEN: Soft, Nontender, Nondistended; Bowel sounds present  EXTREMITIES:  2+ Peripheral Pulses, No clubbing, cyanosis, or edema  PSYCH: AAOx3  NEUROLOGY: non-focal  SKIN: No rashes or lesions    LABS:    07-27    140  |  106  |  36<H>  ----------------------------<  119<H>  3.8   |  17<L>  |  2.17<H>    Ca    8.4      27 Jul 2019 06:27  Mg     1.7     07-26                RADIOLOGY & ADDITIONAL TESTS:    Imaging Personally Reviewed:    Consultant(s) Notes Reviewed:      Care Discussed with Consultants/Other Providers:

## 2019-07-27 NOTE — PROGRESS NOTE ADULT - ASSESSMENT
Problem/Plan - 1:  ·  Problem: GIB (gastrointestinal bleeding).    - SERIAL CBC  - GI FU   - virtual colonoscopy reviewed    Problem/Plan - 2:  ·  Problem: Troponin level elevated.   - 2/2 to possible  NSTEMI -  - tele monitor  - cards fu     Problem/Plan - 3:  ·  Problem: Abnormal CT of the abdomen.    -  virtual colonoscopy with no colonic mass     Problem/Plan - 4:·  Problem: Anemia.   - monitor cbc  - transfuse PRN      Problem/Plan - 5:  ·  Problem: UC (ulcerative colitis).    - continue home medications  - gi following    - c/w PPI    Problem/Plan - 6:  AMS, resolved  urine and urine culture  chest x ray  blood culture x 2 sets  rvp  - w/u negative  - pt responded to seroquel    Problem/Plan - 7:  sinus pause  maria alejandra d/c'ed  seen by EP  no PPM at this time    Problem/Plan - 8:  TITA/CKD  - cont iv fluids      d/w pt and son in great detail

## 2019-07-27 NOTE — PROGRESS NOTE ADULT - SUBJECTIVE AND OBJECTIVE BOX
Patient is a 84y Male whom presented to the hospital with ckd and pb    pt seen  and examined  in am  , nad , no fever   PAST MEDICAL & SURGICAL HISTORY:  UC (ulcerative colitis)  HTN (hypertension)  UC (ulcerative colitis)  HTN (hypertension)  Gout  Chronic kidney disease  Acoustic neuroma  No significant past surgical history  No significant past surgical history      MEDICATIONS  (STANDING):  atorvastatin 20 milliGRAM(s) Oral at bedtime  carbidopa/levodopa  25/100 1 Tablet(s) Oral three times a day  clonazePAM  Tablet 0.5 milliGRAM(s) Oral at bedtime  docusate sodium 100 milliGRAM(s) Oral two times a day  lactobacillus acidophilus 1 Tablet(s) Oral daily  latanoprost 0.005% Ophthalmic Solution 1 Drop(s) Both EYES at bedtime  multivitamin 1 Tablet(s) Oral daily  pantoprazole  Injectable 40 milliGRAM(s) IV Push every 12 hours  sodium chloride 0.9%. 1000 milliLiter(s) (75 mL/Hr) IV Continuous <Continuous>  sulfaSALAzine 500 milliGRAM(s) Oral two times a day  timolol 0.25% Solution 1 Drop(s) Both EYES two times a day      Allergies    No Known Allergies    Intolerances        SOCIAL HISTORY:  Denies ETOh,Smoking,     FAMILY HISTORY:      REVIEW OF SYSTEMS:    CONSTITUTIONAL: No weakness, fevers or chills  RESPIRATORY: No cough, wheezing, hemoptysis; pos  shortness of breath  CARDIOVASCULAR: No chest pain or palpitations  GASTROINTESTINAL: No abdominal or epigastric pain. No nausea, vomiting,     No diarrhea or constipation. pos  melena                                                     07-27    140  |  106  |  36<H>  ----------------------------<  119<H>  3.8   |  17<L>  |  2.17<H>    Ca    8.4      27 Jul 2019 06:27  Mg     1.7     07-26        CAPILLARY BLOOD GLUCOSE          Vital Signs Last 24 Hrs  T(C): 36.6 (27 Jul 2019 12:00), Max: 36.9 (26 Jul 2019 20:28)  T(F): 97.9 (27 Jul 2019 12:00), Max: 98.5 (26 Jul 2019 20:28)  HR: 58 (27 Jul 2019 12:00) (55 - 61)  BP: 146/65 (27 Jul 2019 12:00) (143/66 - 157/73)  BP(mean): --  RR: 18 (27 Jul 2019 12:00) (18 - 18)  SpO2: 99% (27 Jul 2019 12:00) (96% - 99%)                        PHYSICAL EXAM:    Constitutional: NAD  HEENT: conjunctive   clear   Neck:  No JVD  Respiratory: decrease bs b/l   Cardiovascular: S1 and S2  Gastrointestinal: BS+, soft, NT/ND  Extremities: trace  peripheral edema  Neurological:  no focal deficits  Psychiatric: normal affect  Skin: dry  Access: Not applicable

## 2019-07-27 NOTE — PROGRESS NOTE ADULT - ASSESSMENT
85 yo male Acoustic neuroma ,[place] , ,Parkinson Dz, unsteady gait ,  Chronic kidney disease  ,Gout  ,HTN (hypertension)   ,UC (ulcerative colitis) , resident of [facility] brought for eval of SOB and black stools for several days. Found to have anemia and admitted for hemotransfusion and GI workup .Patient had colonoscopy 3 years ago and [place] and had some polyps removed ,he denies any recent hx of GIB or GI workup ,but admits black stools and lightheadedness ,sob ,worsening last few days .Found to have RANJANA elevation and seen by cardiologist Admitted  to telemetry unit for monitoring , send 3 sets of cardiac ensymes to rule out coronary event, obtain ECHO to evaluate LVEF, cardiology consult ,continue current management, O2 supply, anticoagulation plan as per cardiology  ,aggrenox placed on hold until GI clearance will be obtained Nephrology cons called ,IV hydration is administrated ,lisinopril held as per cardiologist recommendation . (15 Jul 2019 08:57)

## 2019-07-27 NOTE — PROGRESS NOTE ADULT - SUBJECTIVE AND OBJECTIVE BOX
INTERVAL HPI/OVERNIGHT EVENTS: No acute events overnight    MEDICATIONS  (STANDING):  carbidopa/levodopa  25/100 1 Tablet(s) Oral three times a day  heparin  Injectable 5000 Unit(s) SubCutaneous every 12 hours  latanoprost 0.005% Ophthalmic Solution 1 Drop(s) Both EYES at bedtime  melatonin 3 milliGRAM(s) Oral at bedtime  QUEtiapine 25 milliGRAM(s) Oral at bedtime  sodium chloride 0.9%. 1000 milliLiter(s) (75 mL/Hr) IV Continuous <Continuous>  timolol 0.25% Solution 1 Drop(s) Both EYES two times a day    MEDICATIONS  (PRN):  acetaminophen   Tablet .. 650 milliGRAM(s) Oral every 6 hours PRN Temp greater or equal to 38C (100.4F), Mild Pain (1 - 3), Moderate Pain (4 - 6)      Allergies    No Known Allergies    Intolerances        Review of Systems:    Unable to obtain due to baseline cognitive function      Vital Signs Last 24 Hrs  T(C): 36.6 (27 Jul 2019 12:00), Max: 36.9 (26 Jul 2019 20:28)  T(F): 97.9 (27 Jul 2019 12:00), Max: 98.5 (26 Jul 2019 20:28)  HR: 58 (27 Jul 2019 12:00) (55 - 61)  BP: 146/65 (27 Jul 2019 12:00) (143/66 - 157/73)  BP(mean): --  RR: 18 (27 Jul 2019 12:00) (18 - 18)  SpO2: 99% (27 Jul 2019 12:00) (96% - 99%)    PHYSICAL EXAM:    Constitutional: NAD  HEENT: EOMI, throat clear  Neck: No LAD, supple  Respiratory: CTA and P  Cardiovascular: S1 and S2, RRR, no M  Gastrointestinal: BS+, soft, NT/ND, neg HSM,  Extremities: No peripheral edema, neg clubbing, cyanosis  Vascular: 2+ peripheral pulses  Neurological: A/O x 0    Skin: No rashes      LABS:    07-27    140  |  106  |  36<H>  ----------------------------<  119<H>  3.8   |  17<L>  |  2.17<H>    Ca    8.4      27 Jul 2019 06:27  Mg     1.7     07-26            RADIOLOGY & ADDITIONAL TESTS:

## 2019-07-27 NOTE — PROGRESS NOTE ADULT - PROBLEM SELECTOR PLAN 1
CHRONIC KIDNEY DISEASE, STAGE 3: start sodium bicarbonate 650 mg po   gfr is decreased dc nephrotoxin med and start iv fluid will get us kidney     increase water intake , continue with iv fluid   Serum creatinine is stable at 2.17 , approximating a GFR of is increased   There is no progression.  No uremic symptoms. No evidence of  worsening  Anemia. Fluid status stable.   Will continue to avoid nephrotoxic drugs.  Patient remains asymptomatic.

## 2019-07-27 NOTE — PROGRESS NOTE ADULT - PROBLEM SELECTOR PLAN 1
virtual colon is negative  no further gi bleed  hgb stable 11.1 <- 10.4  long discussion with family, decision made to forgo  upper gastrointestinal endoscopy at this time given improvement with proton pump inhibitor and holding a/c   Regular diet  dc planning

## 2019-07-27 NOTE — PROGRESS NOTE ADULT - SUBJECTIVE AND OBJECTIVE BOX
S: No distress. Denies chest pain or SOB.      acetaminophen   Tablet .. 650 milliGRAM(s) Oral every 6 hours PRN  carbidopa/levodopa  25/100 1 Tablet(s) Oral three times a day  heparin  Injectable 5000 Unit(s) SubCutaneous every 12 hours  latanoprost 0.005% Ophthalmic Solution 1 Drop(s) Both EYES at bedtime  melatonin 3 milliGRAM(s) Oral at bedtime  QUEtiapine 25 milliGRAM(s) Oral at bedtime  sodium chloride 0.9%. 1000 milliLiter(s) IV Continuous <Continuous>  timolol 0.25% Solution 1 Drop(s) Both EYES two times a day          Hemoglobin: 11.1 g/dL (07-25 @ 07:57)  Hemoglobin: 10.4 g/dL (07-24 @ 10:08)  Hemoglobin: 10.0 g/dL (07-23 @ 08:45)  Hemoglobin: 10.5 g/dL (07-22 @ 19:43)      07-27    140  |  106  |  36<H>  ----------------------------<  119<H>  3.8   |  17<L>  |  2.17<H>    Ca    8.4      27 Jul 2019 06:27  Mg     1.7     07-26      Creatinine Trend: 2.17<--, 2.88<--, 2.07<--, 1.77<--, 1.64<--, 1.09<--    COAGS:           T(C): 36.7 (07-27-19 @ 05:56), Max: 36.9 (07-26-19 @ 11:24)  HR: 55 (07-27-19 @ 05:56) (55 - 61)  BP: 143/66 (07-27-19 @ 05:56) (120/60 - 157/73)  RR: 18 (07-27-19 @ 05:56) (17 - 18)  SpO2: 96% (07-27-19 @ 05:56) (96% - 99%)  Wt(kg): --    I&O's Summary    26 Jul 2019 07:01  -  27 Jul 2019 07:00  --------------------------------------------------------  IN: 300 mL / OUT: 0 mL / NET: 300 mL      Gen: Appears well in NAD  HEENT:  (-)icterus (-)pallor  CV: N S1 S2 1/6 CIARRA (+)2 Pulses B/l  Resp:  Clear to auscultation B/L, normal effort  GI: (+) BS Soft, NT, ND  Lymph:  (-)Edema, (-)obvious lymphadenopathy  Skin: Warm to touch, Normal turgor  Psych: Appropriate mood and affect but confused      TELEMETRY: SB/SR 50-70    ECG:  < from: 12 Lead ECG (07.16.19 @ 18:01) >  Diagnosis Line Normal sinus rhythm  Right bundle branch block  Possible Inferior infarct , age undetermined  Abnormal ECG  When compared with ECG of 15-JUL-2019 06:43,  No significant change was found    < end of copied text >    < from: TTE with Doppler (w/Cont) (07.23.19 @ 18:39) >  Conclusions:  1. Mitral annular calcification and calcified mitral  leaflets with normal diastolic opening.  2. Calcified trileaflet aortic valve with normal opening.  3. Normal left ventricular internal dimensions and wall  thicknesses.  4. Despite the use of ultrasound enhancing agent, the  endocardium is not well visualized; grossly normal left  ventricular systolic function. Unable to accurately assess  for wall motion abnormalities.  5. Normal diastolic function  6. The right ventricle is not well visualized; grossly  normal right ventricular systolic function.  *** No previous Echo exam.    < end of copied text >    < from: CT Head No Cont (07.23.19 @ 16:48) >  IMPRESSION:  Age appropriate involutional change. Microvascular ischemic   change    < end of copied text >      ASSESSMENT/PLAN: 83 yo M with history of HTN, CKD, UC, Parkinson's disease who is being seen for elevated troponin.    - Patient with no chest pain or anginal symptoms    - suspect elevated troponin are due to demand ischemia from GIB and renal disease  - TTE noted above - normal LV/RV function  - Continue statin, add ASA 81mg if/when okay with GI, hold BB  - F/u GI  - AMS w/u per primary team  - Sinus pauses noted on tele 7/25 - patient asymptomatic, BP stable - Hold AVN blockers, monitor for further episodes  - EP eval appreciated   - Will setup patient to follow up in our office upon discharge    Greg May MD, [facility]  BEEPER [phone]

## 2019-07-28 LAB
ANION GAP SERPL CALC-SCNC: 15 MMOL/L — SIGNIFICANT CHANGE UP (ref 5–17)
BUN SERPL-MCNC: 24 MG/DL — HIGH (ref 7–23)
CALCIUM SERPL-MCNC: 9 MG/DL — SIGNIFICANT CHANGE UP (ref 8.4–10.5)
CHLORIDE SERPL-SCNC: 105 MMOL/L — SIGNIFICANT CHANGE UP (ref 96–108)
CO2 SERPL-SCNC: 20 MMOL/L — LOW (ref 22–31)
CREAT SERPL-MCNC: 1.68 MG/DL — HIGH (ref 0.5–1.3)
GLUCOSE SERPL-MCNC: 136 MG/DL — HIGH (ref 70–99)
POTASSIUM SERPL-MCNC: 3.9 MMOL/L — SIGNIFICANT CHANGE UP (ref 3.5–5.3)
POTASSIUM SERPL-SCNC: 3.9 MMOL/L — SIGNIFICANT CHANGE UP (ref 3.5–5.3)
SODIUM SERPL-SCNC: 140 MMOL/L — SIGNIFICANT CHANGE UP (ref 135–145)

## 2019-07-28 RX ADMIN — Medication 3 MILLIGRAM(S): at 22:19

## 2019-07-28 RX ADMIN — CARBIDOPA AND LEVODOPA 1 TABLET(S): 25; 100 TABLET ORAL at 13:05

## 2019-07-28 RX ADMIN — QUETIAPINE FUMARATE 25 MILLIGRAM(S): 200 TABLET, FILM COATED ORAL at 22:19

## 2019-07-28 RX ADMIN — Medication 650 MILLIGRAM(S): at 05:56

## 2019-07-28 RX ADMIN — HEPARIN SODIUM 5000 UNIT(S): 5000 INJECTION INTRAVENOUS; SUBCUTANEOUS at 05:56

## 2019-07-28 RX ADMIN — LATANOPROST 1 DROP(S): 0.05 SOLUTION/ DROPS OPHTHALMIC; TOPICAL at 22:22

## 2019-07-28 RX ADMIN — Medication 650 MILLIGRAM(S): at 17:07

## 2019-07-28 RX ADMIN — Medication 1 DROP(S): at 05:57

## 2019-07-28 RX ADMIN — CARBIDOPA AND LEVODOPA 1 TABLET(S): 25; 100 TABLET ORAL at 05:57

## 2019-07-28 RX ADMIN — Medication 1 DROP(S): at 17:07

## 2019-07-28 RX ADMIN — HEPARIN SODIUM 5000 UNIT(S): 5000 INJECTION INTRAVENOUS; SUBCUTANEOUS at 17:07

## 2019-07-28 RX ADMIN — CARBIDOPA AND LEVODOPA 1 TABLET(S): 25; 100 TABLET ORAL at 22:22

## 2019-07-28 NOTE — PROVIDER CONTACT NOTE (OTHER) - BACKGROUND
84 year old male admitted with hemorrhage of rectum and anus. Dx NSTEMI likely demand sec to GI and CKD. TITA on CKD3. Metaprolol d/c'd on 7/25 because of tele pauses.

## 2019-07-28 NOTE — PROGRESS NOTE ADULT - ASSESSMENT
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 Found to have anemia and admitted for hemotransfusion and GI workup .Patient had colonoscopy 3 years ago and [place] and had some polyps removed ,he denies any recent hx of GIB or GI workup ,but admits black stools and lightheadedness ,sob ,worsening last few days .Found to have RANJANA elevation and seen by cardiologist Admitted  to telemetry unit for monitoring , send 3 sets of cardiac ensymes to rule out coronary event, obtain ECHO to evaluate LVEF, cardiology consult ,continue current management, O2 supply, anticoagulation plan as per cardiology  ,aggrenox placed on hold until GI clearance will be obtained Nephrology cons called ,IV hydration is administrated ,lisinopril held as per cardiologist recommendation . (15 Jul 2019 08:57)

## 2019-07-28 NOTE — PROGRESS NOTE ADULT - SUBJECTIVE AND OBJECTIVE BOX
pt seen and examined, no complaints, ROS - .     acetaminophen   Tablet .. 650 milliGRAM(s) Oral every 6 hours PRN  carbidopa/levodopa  25/100 1 Tablet(s) Oral three times a day  heparin  Injectable 5000 Unit(s) SubCutaneous every 12 hours  latanoprost 0.005% Ophthalmic Solution 1 Drop(s) Both EYES at bedtime  melatonin 3 milliGRAM(s) Oral at bedtime  QUEtiapine 25 milliGRAM(s) Oral at bedtime  sodium bicarbonate 650 milliGRAM(s) Oral two times a day  sodium chloride 0.9%. 1000 milliLiter(s) IV Continuous <Continuous>  timolol 0.25% Solution 1 Drop(s) Both EYES two times a day          Hemoglobin: 11.1 g/dL (07-25 @ 07:57)  Hemoglobin: 10.4 g/dL (07-24 @ 10:08)  Hemoglobin: 10.0 g/dL (07-23 @ 08:45)      07-27    140  |  106  |  36<H>  ----------------------------<  119<H>  3.8   |  17<L>  |  2.17<H>    Ca    8.4      27 Jul 2019 06:27  Mg     1.7     07-26      Creatinine Trend: 2.17<--, 2.88<--, 2.07<--, 1.77<--, 1.64<--, 1.09<--    COAGS:           T(C): 36.6 (07-28-19 @ 04:36), Max: 37.3 (07-28-19 @ 00:44)  HR: 66 (07-28-19 @ 04:36) (55 - 89)  BP: 142/69 (07-28-19 @ 04:36) (142/69 - 176/90)  RR: 18 (07-28-19 @ 04:36) (18 - 19)  SpO2: 95% (07-28-19 @ 04:36) (95% - 99%)  Wt(kg): --    I&O's Summary    26 Jul 2019 07:01  -  27 Jul 2019 07:00  --------------------------------------------------------  IN: 300 mL / OUT: 0 mL / NET: 300 mL    27 Jul 2019 07:01  -  28 Jul 2019 04:58  --------------------------------------------------------  IN: 1152.5 mL / OUT: 750 mL / NET: 402.5 mL      Gen: Appears well in NAD  HEENT:  (-)icterus (-)pallor  CV: N S1 S2 1/6 CIARRA (+)2 Pulses B/l  Resp:  Clear to auscultation B/L, normal effort  GI: (+) BS Soft, NT, ND  Lymph:  (-)Edema, (-)obvious lymphadenopathy  Skin: Warm to touch, Normal turgor  Psych: Appropriate mood and affect      TELEMETRY: SB/SR 50-70    ECG:  < from: 12 Lead ECG (07.16.19 @ 18:01) >  Diagnosis Line Normal sinus rhythm  Right bundle branch block  Possible Inferior infarct , age undetermined  Abnormal ECG  When compared with ECG of 15-JUL-2019 06:43,  No significant change was found    < end of copied text >    < from: TTE with Doppler (w/Cont) (07.23.19 @ 18:39) >  Conclusions:  1. Mitral annular calcification and calcified mitral  leaflets with normal diastolic opening.  2. Calcified trileaflet aortic valve with normal opening.  3. Normal left ventricular internal dimensions and wall  thicknesses.  4. Despite the use of ultrasound enhancing agent, the  endocardium is not well visualized; grossly normal left  ventricular systolic function. Unable to accurately assess  for wall motion abnormalities.  5. Normal diastolic function  6. The right ventricle is not well visualized; grossly  normal right ventricular systolic function.  *** No previous Echo exam.    < end of copied text >    < from: CT Head No Cont (07.23.19 @ 16:48) >  IMPRESSION:  Age appropriate involutional change. Microvascular ischemic   change    < end of copied text >      ASSESSMENT/PLAN: 85 yo M with history of HTN, CKD, UC, Parkinson's disease who is being seen for elevated troponin.    -  GI / DVT prophylaxis,  keep K>4, mag >2.0  - Patient with no chest pain or anginal symptoms    - cont seroquel   - cont all parkinson meds  - TTE noted above - normal LV/RV function   - TELE stable   -no need for PPM at this time as patient asymptomatic and the etiology of the bradycardia is reversible  D/W Dr Cid

## 2019-07-28 NOTE — PROGRESS NOTE ADULT - SUBJECTIVE AND OBJECTIVE BOX
INTERVAL HPI/OVERNIGHT EVENTS: No acute events overnight    MEDICATIONS  (STANDING):  carbidopa/levodopa  25/100 1 Tablet(s) Oral three times a day  heparin  Injectable 5000 Unit(s) SubCutaneous every 12 hours  latanoprost 0.005% Ophthalmic Solution 1 Drop(s) Both EYES at bedtime  melatonin 3 milliGRAM(s) Oral at bedtime  QUEtiapine 25 milliGRAM(s) Oral at bedtime  sodium bicarbonate 650 milliGRAM(s) Oral two times a day  sodium chloride 0.9%. 1000 milliLiter(s) (40 mL/Hr) IV Continuous <Continuous>  timolol 0.25% Solution 1 Drop(s) Both EYES two times a day    MEDICATIONS  (PRN):  acetaminophen   Tablet .. 650 milliGRAM(s) Oral every 6 hours PRN Temp greater or equal to 38C (100.4F), Mild Pain (1 - 3), Moderate Pain (4 - 6)      Allergies    No Known Allergies    Intolerances        Review of Systems:    Unable to obtain due to patient's baseline cognitive status    Vital Signs Last 24 Hrs  T(C): 36.6 (28 Jul 2019 04:36), Max: 37.3 (28 Jul 2019 00:44)  T(F): 97.8 (28 Jul 2019 04:36), Max: 99.1 (28 Jul 2019 00:44)  HR: 66 (28 Jul 2019 04:36) (58 - 89)  BP: 142/69 (28 Jul 2019 04:36) (142/69 - 176/90)  BP(mean): --  RR: 18 (28 Jul 2019 04:36) (18 - 19)  SpO2: 95% (28 Jul 2019 04:36) (95% - 99%)    PHYSICAL EXAM:    Constitutional: NAD  HEENT: EOMI, throat clear  Neck: No LAD, supple  Respiratory: CTA and P  Cardiovascular: S1 and S2, RRR, no M  Gastrointestinal: BS+, soft, NT/ND, neg HSM,  Extremities: No peripheral edema, neg clubbing, cyanosis  Vascular: 2+ peripheral pulses  Neurological: no focal deficits  Psychiatric: calm  Skin: No rashes      LABS:    07-28    140  |  105  |  24<H>  ----------------------------<  136<H>  3.9   |  20<L>  |  1.68<H>    Ca    9.0      28 Jul 2019 11:07            RADIOLOGY & ADDITIONAL TESTS:

## 2019-07-28 NOTE — PROVIDER CONTACT NOTE (OTHER) - ASSESSMENT
Patient is alert, asymptomatic, lying in bed more restless than previously. Patient is not attempting to get out of bed, but he is disoriented and talking to self continuously. Patient denies c/p, SOB, palpitations.

## 2019-07-28 NOTE — PROGRESS NOTE ADULT - ASSESSMENT
Problem/Plan - 1:  ·  Problem: GIB (gastrointestinal bleeding).    - SERIAL CBC  - GI FU   - virtual colonoscopy reviewed    Problem/Plan - 2:  ·  Problem: Troponin level elevated.   - 2/2 to possible  NSTEMI -  - tele monitor  - cards fu     Problem/Plan - 3:  ·  Problem: Abnormal CT of the abdomen.    -  virtual colonoscopy with no colonic mass     Problem/Plan - 4:·  Problem: Anemia.   - monitor cbc  - transfuse PRN      Problem/Plan - 5:  ·  Problem: UC (ulcerative colitis).    - continue home medications  - gi following    - c/w PPI    Problem/Plan - 6:  AMS,   urine and urine culture negative  chest x ray NAPD  blood culture x 2 sets NGTD  rvp negative  - w/u negative  - pt responded to seroquel in the past    Problem/Plan - 7:  sinus pause  maria alejandra d/c'ed  seen by EP  no PPM at this time    Problem/Plan - 8:  TITA/CKD  - cont iv fluids      d/w pt and son in great detail

## 2019-07-28 NOTE — PROGRESS NOTE ADULT - SUBJECTIVE AND OBJECTIVE BOX
Patient is a 84y old  Male who presents with a chief complaint of SOB , anemia ,black stools (28 Jul 2019 10:09)      SUBJECTIVE / OVERNIGHT EVENTS:  pt is confused.        MEDICATIONS  (STANDING):  carbidopa/levodopa  25/100 1 Tablet(s) Oral three times a day  heparin  Injectable 5000 Unit(s) SubCutaneous every 12 hours  latanoprost 0.005% Ophthalmic Solution 1 Drop(s) Both EYES at bedtime  melatonin 3 milliGRAM(s) Oral at bedtime  QUEtiapine 25 milliGRAM(s) Oral at bedtime  sodium bicarbonate 650 milliGRAM(s) Oral two times a day  sodium chloride 0.9%. 1000 milliLiter(s) (40 mL/Hr) IV Continuous <Continuous>  timolol 0.25% Solution 1 Drop(s) Both EYES two times a day    MEDICATIONS  (PRN):  acetaminophen   Tablet .. 650 milliGRAM(s) Oral every 6 hours PRN Temp greater or equal to 38C (100.4F), Mild Pain (1 - 3), Moderate Pain (4 - 6)      Vital Signs Last 24 Hrs  T(C): 36.6 (28 Jul 2019 04:36), Max: 37.3 (28 Jul 2019 00:44)  T(F): 97.8 (28 Jul 2019 04:36), Max: 99.1 (28 Jul 2019 00:44)  HR: 66 (28 Jul 2019 04:36) (58 - 89)  BP: 142/69 (28 Jul 2019 04:36) (142/69 - 176/90)  BP(mean): --  RR: 18 (28 Jul 2019 04:36) (18 - 19)  SpO2: 95% (28 Jul 2019 04:36) (95% - 99%)  CAPILLARY BLOOD GLUCOSE        I&O's Summary    27 Jul 2019 07:01  -  28 Jul 2019 07:00  --------------------------------------------------------  IN: 1692.5 mL / OUT: 950 mL / NET: 742.5 mL    28 Jul 2019 07:01  -  28 Jul 2019 10:11  --------------------------------------------------------  IN: 0 mL / OUT: 200 mL / NET: -200 mL        PHYSICAL EXAM:  GENERAL: NAD, well-developed  HEAD:  Atraumatic, Normocephalic  EYES: EOMI, PERRLA, conjunctiva and sclera clear  NECK: Supple, No JVD  CHEST/LUNG: Clear to auscultation bilaterally; No wheeze  HEART: Regular rate and rhythm; No murmurs, rubs, or gallops  ABDOMEN: Soft, Nontender, Nondistended; Bowel sounds present  EXTREMITIES:  2+ Peripheral Pulses, No clubbing, cyanosis, or edema  PSYCH: AAOx1  NEUROLOGY: non-focal  SKIN: No rashes or lesions    LABS:    07-27    140  |  106  |  36<H>  ----------------------------<  119<H>  3.8   |  17<L>  |  2.17<H>    Ca    8.4      27 Jul 2019 06:27                RADIOLOGY & ADDITIONAL TESTS:    Imaging Personally Reviewed:    Consultant(s) Notes Reviewed:      Care Discussed with Consultants/Other Providers:

## 2019-07-28 NOTE — PROGRESS NOTE ADULT - SUBJECTIVE AND OBJECTIVE BOX
Patient is a 84y Male whom presented to the hospital with ckd and pb    pt seen  and examined  in am  , nad , no fever   PAST MEDICAL & SURGICAL HISTORY:  UC (ulcerative colitis)  HTN (hypertension)  UC (ulcerative colitis)  HTN (hypertension)  Gout  Chronic kidney disease  Acoustic neuroma  No significant past surgical history  No significant past surgical history      MEDICATIONS  (STANDING):  atorvastatin 20 milliGRAM(s) Oral at bedtime  carbidopa/levodopa  25/100 1 Tablet(s) Oral three times a day  clonazePAM  Tablet 0.5 milliGRAM(s) Oral at bedtime  docusate sodium 100 milliGRAM(s) Oral two times a day  lactobacillus acidophilus 1 Tablet(s) Oral daily  latanoprost 0.005% Ophthalmic Solution 1 Drop(s) Both EYES at bedtime  multivitamin 1 Tablet(s) Oral daily  pantoprazole  Injectable 40 milliGRAM(s) IV Push every 12 hours  sodium chloride 0.9%. 1000 milliLiter(s) (75 mL/Hr) IV Continuous <Continuous>  sulfaSALAzine 500 milliGRAM(s) Oral two times a day  timolol 0.25% Solution 1 Drop(s) Both EYES two times a day      Allergies    No Known Allergies    Intolerances        SOCIAL HISTORY:  Denies ETOh,Smoking,     FAMILY HISTORY:      REVIEW OF SYSTEMS:    CONSTITUTIONAL: No weakness, fevers or chills  RESPIRATORY: No cough, wheezing, hemoptysis; pos  shortness of breath  CARDIOVASCULAR: No chest pain or palpitations  GASTROINTESTINAL: No abdominal or epigastric pain. No nausea, vomiting,     No diarrhea or constipation. pos  melena                                                             07-28    140  |  105  |  24<H>  ----------------------------<  136<H>  3.9   |  20<L>  |  1.68<H>    Ca    9.0      28 Jul 2019 11:07        CAPILLARY BLOOD GLUCOSE          Vital Signs Last 24 Hrs  T(C): 36.4 (28 Jul 2019 12:00), Max: 37.3 (28 Jul 2019 00:44)  T(F): 97.6 (28 Jul 2019 12:00), Max: 99.1 (28 Jul 2019 00:44)  HR: 64 (28 Jul 2019 12:00) (58 - 89)  BP: 145/76 (28 Jul 2019 12:00) (142/69 - 176/90)  BP(mean): --  RR: 18 (28 Jul 2019 12:00) (18 - 19)  SpO2: 98% (28 Jul 2019 12:00) (95% - 98%)                          PHYSICAL EXAM:    Constitutional: NAD  HEENT: conjunctive   clear   Neck:  No JVD  Respiratory: decrease bs b/l   Cardiovascular: S1 and S2  Gastrointestinal: BS+, soft, NT/ND  Extremities: trace  peripheral edema  Neurological:  no focal deficits  Psychiatric: normal affect  Skin: dry  Access: Not applicable

## 2019-07-28 NOTE — PROGRESS NOTE ADULT - SUBJECTIVE AND OBJECTIVE BOX
S: No distress. Denies chest pain or SOB.      acetaminophen   Tablet .. 650 milliGRAM(s) Oral every 6 hours PRN  carbidopa/levodopa  25/100 1 Tablet(s) Oral three times a day  heparin  Injectable 5000 Unit(s) SubCutaneous every 12 hours  latanoprost 0.005% Ophthalmic Solution 1 Drop(s) Both EYES at bedtime  melatonin 3 milliGRAM(s) Oral at bedtime  QUEtiapine 25 milliGRAM(s) Oral at bedtime  sodium bicarbonate 650 milliGRAM(s) Oral two times a day  sodium chloride 0.9%. 1000 milliLiter(s) IV Continuous <Continuous>  timolol 0.25% Solution 1 Drop(s) Both EYES two times a day          Hemoglobin: 11.1 g/dL (07-25 @ 07:57)  Hemoglobin: 10.4 g/dL (07-24 @ 10:08)      07-27    140  |  106  |  36<H>  ----------------------------<  119<H>  3.8   |  17<L>  |  2.17<H>    Ca    8.4      27 Jul 2019 06:27      Creatinine Trend: 2.17<--, 2.88<--, 2.07<--, 1.77<--, 1.64<--, 1.09<--    COAGS:           T(C): 36.6 (07-28-19 @ 04:36), Max: 37.3 (07-28-19 @ 00:44)  HR: 66 (07-28-19 @ 04:36) (58 - 89)  BP: 142/69 (07-28-19 @ 04:36) (142/69 - 176/90)  RR: 18 (07-28-19 @ 04:36) (18 - 19)  SpO2: 95% (07-28-19 @ 04:36) (95% - 99%)  Wt(kg): --    I&O's Summary    27 Jul 2019 07:01  -  28 Jul 2019 07:00  --------------------------------------------------------  IN: 1692.5 mL / OUT: 950 mL / NET: 742.5 mL    28 Jul 2019 07:01  -  28 Jul 2019 10:10  --------------------------------------------------------  IN: 0 mL / OUT: 200 mL / NET: -200 mL        Gen: Appears well in NAD  HEENT:  (-)icterus (-)pallor  CV: N S1 S2 1/6 CIARRA (+)2 Pulses B/l  Resp:  Clear to auscultation B/L, normal effort  GI: (+) BS Soft, NT, ND  Lymph:  (-)Edema, (-)obvious lymphadenopathy  Skin: Warm to touch, Normal turgor  Psych: Appropriate mood and affect but confused      TELEMETRY: SB/SR 50-70    ECG:  < from: 12 Lead ECG (07.16.19 @ 18:01) >  Diagnosis Line Normal sinus rhythm  Right bundle branch block  Possible Inferior infarct , age undetermined  Abnormal ECG  When compared with ECG of 15-JUL-2019 06:43,  No significant change was found    < end of copied text >    < from: TTE with Doppler (w/Cont) (07.23.19 @ 18:39) >  Conclusions:  1. Mitral annular calcification and calcified mitral  leaflets with normal diastolic opening.  2. Calcified trileaflet aortic valve with normal opening.  3. Normal left ventricular internal dimensions and wall  thicknesses.  4. Despite the use of ultrasound enhancing agent, the  endocardium is not well visualized; grossly normal left  ventricular systolic function. Unable to accurately assess  for wall motion abnormalities.  5. Normal diastolic function  6. The right ventricle is not well visualized; grossly  normal right ventricular systolic function.  *** No previous Echo exam.    < end of copied text >    < from: CT Head No Cont (07.23.19 @ 16:48) >  IMPRESSION:  Age appropriate involutional change. Microvascular ischemic   change    < end of copied text >      ASSESSMENT/PLAN: 85 yo M with history of HTN, CKD, UC, Parkinson's disease who is being seen for elevated troponin.    - Patient with no chest pain or anginal symptoms    - suspect elevated troponin are due to demand ischemia from GIB and renal disease  - TTE noted above - normal LV/RV function  - Continue statin, add ASA 81mg if/when okay with GI, hold BB  - F/u GI  - AMS w/u per primary team  - Sinus pauses noted on tele 7/25 - patient asymptomatic, BP stable - Hold AVN blockers, monitor for further episodes  - EP eval appreciated   - Will setup patient to follow up in our office upon discharge    Greg May MD, [facility]  BEEPER [phone]

## 2019-07-28 NOTE — PROVIDER CONTACT NOTE (OTHER) - ACTION/TREATMENT ORDERED:
Provider contacted. Repeat manual BP taken without much change. Metaprolol d/c'd on 7/25 because of tele pauses. Since patient is asymptomatic, we will continue to monitor patient for now,

## 2019-07-28 NOTE — PROGRESS NOTE ADULT - PROBLEM SELECTOR PLAN 1
CHRONIC KIDNEY DISEASE, STAGE 3: start sodium bicarbonate 650 mg po   gfr is increased   increase water intake , continue with iv fluid   Serum creatinine is stable at 2.17 , approximating a GFR of is increased   There is no progression.  No uremic symptoms. No evidence of  worsening  Anemia. Fluid status stable.   Will continue to avoid nephrotoxic drugs.  Patient remains asymptomatic.

## 2019-07-28 NOTE — PROGRESS NOTE ADULT - PROBLEM SELECTOR PLAN 1
virtual colon is negative  no further gi bleed  hgb stable   long discussion with family, decision made to forgo  upper gastrointestinal endoscopy at this time given improvement with proton pump inhibitor and holding a/c   Regular diet  dc planning

## 2019-07-29 LAB
ANION GAP SERPL CALC-SCNC: 15 MMOL/L — SIGNIFICANT CHANGE UP (ref 5–17)
BUN SERPL-MCNC: 21 MG/DL — SIGNIFICANT CHANGE UP (ref 7–23)
CALCIUM SERPL-MCNC: 8.9 MG/DL — SIGNIFICANT CHANGE UP (ref 8.4–10.5)
CHLORIDE SERPL-SCNC: 109 MMOL/L — HIGH (ref 96–108)
CO2 SERPL-SCNC: 20 MMOL/L — LOW (ref 22–31)
CREAT SERPL-MCNC: 1.63 MG/DL — HIGH (ref 0.5–1.3)
CULTURE RESULTS: SIGNIFICANT CHANGE UP
CULTURE RESULTS: SIGNIFICANT CHANGE UP
GLUCOSE SERPL-MCNC: 107 MG/DL — HIGH (ref 70–99)
POTASSIUM SERPL-MCNC: 3.8 MMOL/L — SIGNIFICANT CHANGE UP (ref 3.5–5.3)
POTASSIUM SERPL-SCNC: 3.8 MMOL/L — SIGNIFICANT CHANGE UP (ref 3.5–5.3)
SODIUM SERPL-SCNC: 144 MMOL/L — SIGNIFICANT CHANGE UP (ref 135–145)
SPECIMEN SOURCE: SIGNIFICANT CHANGE UP
SPECIMEN SOURCE: SIGNIFICANT CHANGE UP

## 2019-07-29 RX ORDER — ASPIRIN/CALCIUM CARB/MAGNESIUM 324 MG
81 TABLET ORAL DAILY
Refills: 0 | Status: DISCONTINUED | OUTPATIENT
Start: 2019-07-29 | End: 2019-07-31

## 2019-07-29 RX ADMIN — CARBIDOPA AND LEVODOPA 1 TABLET(S): 25; 100 TABLET ORAL at 05:36

## 2019-07-29 RX ADMIN — HEPARIN SODIUM 5000 UNIT(S): 5000 INJECTION INTRAVENOUS; SUBCUTANEOUS at 18:35

## 2019-07-29 RX ADMIN — HEPARIN SODIUM 5000 UNIT(S): 5000 INJECTION INTRAVENOUS; SUBCUTANEOUS at 05:35

## 2019-07-29 RX ADMIN — Medication 650 MILLIGRAM(S): at 05:36

## 2019-07-29 RX ADMIN — CARBIDOPA AND LEVODOPA 1 TABLET(S): 25; 100 TABLET ORAL at 23:07

## 2019-07-29 RX ADMIN — Medication 1 DROP(S): at 18:35

## 2019-07-29 RX ADMIN — Medication 3 MILLIGRAM(S): at 23:06

## 2019-07-29 RX ADMIN — CARBIDOPA AND LEVODOPA 1 TABLET(S): 25; 100 TABLET ORAL at 13:22

## 2019-07-29 RX ADMIN — Medication 1 DROP(S): at 05:36

## 2019-07-29 RX ADMIN — LATANOPROST 1 DROP(S): 0.05 SOLUTION/ DROPS OPHTHALMIC; TOPICAL at 23:08

## 2019-07-29 RX ADMIN — Medication 650 MILLIGRAM(S): at 18:37

## 2019-07-29 NOTE — PROGRESS NOTE ADULT - SUBJECTIVE AND OBJECTIVE BOX
EP ATTENDING    tele: NSR 90s    no palpitations, no syncope, no angina    acetaminophen   Tablet .. 650 milliGRAM(s) Oral every 6 hours PRN  carbidopa/levodopa  25/100 1 Tablet(s) Oral three times a day  heparin  Injectable 5000 Unit(s) SubCutaneous every 12 hours  latanoprost 0.005% Ophthalmic Solution 1 Drop(s) Both EYES at bedtime  melatonin 3 milliGRAM(s) Oral at bedtime  QUEtiapine 25 milliGRAM(s) Oral at bedtime  sodium bicarbonate 650 milliGRAM(s) Oral two times a day  sodium chloride 0.9%. 1000 milliLiter(s) IV Continuous <Continuous>  timolol 0.25% Solution 1 Drop(s) Both EYES two times a day          07-29    144  |  109<H>  |  21  ----------------------------<  107<H>  3.8   |  20<L>  |  1.63<H>    Ca    8.9      29 Jul 2019 07:07              T(C): 37.2 (07-29-19 @ 04:58), Max: 37.2 (07-29-19 @ 04:58)  HR: 67 (07-29-19 @ 04:58) (64 - 87)  BP: 144/72 (07-29-19 @ 04:58) (113/86 - 159/61)  RR: 18 (07-29-19 @ 04:58) (18 - 19)  SpO2: 98% (07-29-19 @ 04:58) (94% - 98%)  Wt(kg): --    no JVD  RRR, no murmurs  CTAB  soft nt/nd  no c/c/e      A/P) He is a pleasant 85 y/o male admitted almost two weeks ago with melena. EP is called because last Thursday he had asymptomatic 3-4.5 second sinus pauses on tele after a beta blocker was started for a NSTEMI. His LVEF and TSH are normal. His QRS is narrow. He denies syncope nor presyncope.    -no more beta blockers  -no need for PPM at this time as patient asymptomatic and the etiology of the bradycardia is reversible  -no further inpatient EP workup needed as long as tele remains unremarkable      Lupe Cid M.D., [facility]  Cardiac Electrophysiology  [facility]  [address] [facility], [address] [place] [address]  [email or web address]    office [phone]  pager [phone]

## 2019-07-29 NOTE — PROGRESS NOTE ADULT - ASSESSMENT
Problem/Plan - 1:  ·  Problem: GIB (gastrointestinal bleeding).    - SERIAL CBC  - GI FU   - virtual colonoscopy reviewed    Problem/Plan - 2:  ·  Problem: Troponin level elevated.   - 2/2 to possible  NSTEMI -  - tele monitor  - cards fu     Problem/Plan - 3:  ·  Problem: Abnormal CT of the abdomen.    -  virtual colonoscopy with no colonic mass     Problem/Plan - 4:·  Problem: Anemia.   - monitor cbc  - transfuse PRN      Problem/Plan - 5:  ·  Problem: UC (ulcerative colitis).    - continue home medications  - gi following    - c/w PPI    Problem/Plan - 6:  AMS unknown etiology ? hospital acquired delirium  urine and urine culture negative  chest x ray NAPD  blood culture x 2 sets NGTD  rvp negative  - w/u negative  - pt responded to seroquel in the past  -     Problem/Plan - 7:  sinus pause  [place] d/c'ed  seen by EP  no PPM at this time    Problem/Plan - 8:  TITA/CKD improved  - s/p iv fluids

## 2019-07-29 NOTE — PROGRESS NOTE ADULT - SUBJECTIVE AND OBJECTIVE BOX
Patient is a 84y old  Male who presents with a chief complaint of SOB , anemia ,black stools (29 Jul 2019 12:22)      SUBJECTIVE / OVERNIGHT EVENTS:  confused    MEDICATIONS  (STANDING):  carbidopa/levodopa  25/100 1 Tablet(s) Oral three times a day  heparin  Injectable 5000 Unit(s) SubCutaneous every 12 hours  latanoprost 0.005% Ophthalmic Solution 1 Drop(s) Both EYES at bedtime  melatonin 3 milliGRAM(s) Oral at bedtime  QUEtiapine 25 milliGRAM(s) Oral at bedtime  sodium bicarbonate 650 milliGRAM(s) Oral two times a day  timolol 0.25% Solution 1 Drop(s) Both EYES two times a day    MEDICATIONS  (PRN):  acetaminophen   Tablet .. 650 milliGRAM(s) Oral every 6 hours PRN Temp greater or equal to 38C (100.4F), Mild Pain (1 - 3), Moderate Pain (4 - 6)      Vital Signs Last 24 Hrs  T(C): 36.6 (29 Jul 2019 13:29), Max: 37.2 (29 Jul 2019 04:58)  T(F): 97.9 (29 Jul 2019 13:29), Max: 99 (29 Jul 2019 04:58)  HR: 62 (29 Jul 2019 13:29) (62 - 87)  BP: 142/46 (29 Jul 2019 13:29) (113/86 - 159/61)  BP(mean): --  RR: 17 (29 Jul 2019 13:29) (17 - 19)  SpO2: 96% (29 Jul 2019 13:29) (94% - 98%)  CAPILLARY BLOOD GLUCOSE        I&O's Summary    28 Jul 2019 07:01  -  29 Jul 2019 07:00  --------------------------------------------------------  IN: 1260 mL / OUT: 1000 mL / NET: 260 mL    29 Jul 2019 07:01  -  29 Jul 2019 14:25  --------------------------------------------------------  IN: 0 mL / OUT: 200 mL / NET: -200 mL        PHYSICAL EXAM:  GENERAL: NAD, well-developed  HEAD:  Atraumatic, Normocephalic  EYES: EOMI, PERRLA, conjunctiva and sclera clear  NECK: Supple, No JVD  CHEST/LUNG: Clear to auscultation bilaterally; No wheeze  HEART: Regular rate and rhythm; No murmurs, rubs, or gallops  ABDOMEN: Soft, Nontender, Nondistended; Bowel sounds present  EXTREMITIES:  2+ Peripheral Pulses, No clubbing, cyanosis, or edema  PSYCH: AAOx1  NEUROLOGY: non-focal  SKIN: No rashes or lesions    LABS:    07-29    144  |  109<H>  |  21  ----------------------------<  107<H>  3.8   |  20<L>  |  1.63<H>    Ca    8.9      29 Jul 2019 07:07                RADIOLOGY & ADDITIONAL TESTS:    Imaging Personally Reviewed:    Consultant(s) Notes Reviewed:      Care Discussed with Consultants/Other Providers:

## 2019-07-29 NOTE — CHART NOTE - NSCHARTNOTEFT_GEN_A_CORE
Called by RN regarding report from PCA that he witnessed pt suddenly with "strong shaking tremors throughout body". Witness denies pt was grunting or clenching jaw. Witness states episode lasted about 10 seconds and then pt promptly returned to his pre-episode state of arousable lethargy.     On evaluation pt is in his earlier state of lethargy although easily arousable with verbal responses to queries although remains confused with delirium. No indication of post-ictal state.     A/P: Questionable seizure activity  -Spoke with pt's son who states he is unaware of any seizure history in pt nor any previous Neurology evaluation despite Parkinson's Dx. Neuro consult called to [facility]   -EEG ordered  -Seroquel D/C'd due to increasing lethargy and potential for seizure activity. Spoke with Dr Frazier from Psychiatry for re-consult for behavioral management due to lethargy with Seroquel and possible seizure activity.    Pt continues to appear comfortable, resting in bed, confusion state continues, no further trembling activity observed.

## 2019-07-29 NOTE — PROGRESS NOTE ADULT - SUBJECTIVE AND OBJECTIVE BOX
S: Confused. No distress. Denies chest pain or SOB.        MEDICATIONS  (STANDING):  carbidopa/levodopa  25/100 1 Tablet(s) Oral three times a day  heparin  Injectable 5000 Unit(s) SubCutaneous every 12 hours  latanoprost 0.005% Ophthalmic Solution 1 Drop(s) Both EYES at bedtime  melatonin 3 milliGRAM(s) Oral at bedtime  QUEtiapine 25 milliGRAM(s) Oral at bedtime  sodium bicarbonate 650 milliGRAM(s) Oral two times a day  timolol 0.25% Solution 1 Drop(s) Both EYES two times a day    MEDICATIONS  (PRN):  acetaminophen   Tablet .. 650 milliGRAM(s) Oral every 6 hours PRN Temp greater or equal to 38C (100.4F), Mild Pain (1 - 3), Moderate Pain (4 - 6)      LABS:        Hemoglobin: 11.1 g/dL (07-25 @ 07:57)    07-29    144  |  109<H>  |  21  ----------------------------<  107<H>  3.8   |  20<L>  |  1.63<H>    Ca    8.9      29 Jul 2019 07:07      Creatinine Trend: 1.63<--, 1.68<--, 2.17<--, 2.88<--, 2.07<--, 1.77<--           PHYSICAL EXAM  Vital Signs Last 24 Hrs  T(C): 37.2 (29 Jul 2019 04:58), Max: 37.2 (29 Jul 2019 04:58)  T(F): 99 (29 Jul 2019 04:58), Max: 99 (29 Jul 2019 04:58)  HR: 67 (29 Jul 2019 04:58) (67 - 87)  BP: 144/72 (29 Jul 2019 04:58) (113/86 - 159/61)  BP(mean): --  RR: 18 (29 Jul 2019 04:58) (18 - 19)  SpO2: 98% (29 Jul 2019 04:58) (94% - 98%)        Gen: Appears well in NAD  HEENT:  (-)icterus (-)pallor  CV: N S1 S2 1/6 CIARRA (+)2 Pulses B/l  Resp:  Clear to auscultation B/L, normal effort  GI: (+) BS Soft, NT, ND  Lymph:  (-)Edema, (-)obvious lymphadenopathy  Skin: Warm to touch, Normal turgor  Psych: Appropriate mood and affect but confused      TELEMETRY: SR/ST    ECG:  < from: 12 Lead ECG (07.16.19 @ 18:01) >  Diagnosis Line Normal sinus rhythm  Right bundle branch block  Possible Inferior infarct , age undetermined  Abnormal ECG  When compared with ECG of 15-JUL-2019 06:43,  No significant change was found    < end of copied text >    < from: TTE with Doppler (w/Cont) (07.23.19 @ 18:39) >  Conclusions:  1. Mitral annular calcification and calcified mitral  leaflets with normal diastolic opening.  2. Calcified trileaflet aortic valve with normal opening.  3. Normal left ventricular internal dimensions and wall  thicknesses.  4. Despite the use of ultrasound enhancing agent, the  endocardium is not well visualized; grossly normal left  ventricular systolic function. Unable to accurately assess  for wall motion abnormalities.  5. Normal diastolic function  6. The right ventricle is not well visualized; grossly  normal right ventricular systolic function.  *** No previous Echo exam.    < end of copied text >    < from: CT Head No Cont (07.23.19 @ 16:48) >  IMPRESSION:  Age appropriate involutional change. Microvascular ischemic   change    < end of copied text >      ASSESSMENT/PLAN: 83 yo M with history of HTN, CKD, UC, Parkinson's disease who is being seen for elevated troponin.    - Patient with no chest pain or anginal symptoms    - Suspect elevated troponin are due to demand ischemia from GIB and renal disease  - TTE noted above - normal LV/RV function  - Continue statin, add ASA 81mg if/when okay with GI - d/w medicine team  - F/u GI - no further intervention  - AMS w/u per primary team  - Sinus pauses noted on tele 7/25 - patient asymptomatic, BP stable - Hold AVN blockers, monitor for further episodes  - EP kaylin appreciated   - Will setup patient to follow up in our office upon discharge    [facility]  Pager: [phone]

## 2019-07-29 NOTE — PROGRESS NOTE ADULT - SUBJECTIVE AND OBJECTIVE BOX
Patient is a 84y Male whom presented to the hospital with ckd and pb    pt seen  and examined  in am  , nad , no fever   PAST MEDICAL & SURGICAL HISTORY:  UC (ulcerative colitis)  HTN (hypertension)  UC (ulcerative colitis)  HTN (hypertension)  Gout  Chronic kidney disease  Acoustic neuroma  No significant past surgical history  No significant past surgical history      MEDICATIONS  (STANDING):  atorvastatin 20 milliGRAM(s) Oral at bedtime  carbidopa/levodopa  25/100 1 Tablet(s) Oral three times a day  clonazePAM  Tablet 0.5 milliGRAM(s) Oral at bedtime  docusate sodium 100 milliGRAM(s) Oral two times a day  lactobacillus acidophilus 1 Tablet(s) Oral daily  latanoprost 0.005% Ophthalmic Solution 1 Drop(s) Both EYES at bedtime  multivitamin 1 Tablet(s) Oral daily  pantoprazole  Injectable 40 milliGRAM(s) IV Push every 12 hours  sodium chloride 0.9%. 1000 milliLiter(s) (75 mL/Hr) IV Continuous <Continuous>  sulfaSALAzine 500 milliGRAM(s) Oral two times a day  timolol 0.25% Solution 1 Drop(s) Both EYES two times a day      Allergies    No Known Allergies    Intolerances        SOCIAL HISTORY:  Denies ETOh,Smoking,     FAMILY HISTORY:      REVIEW OF SYSTEMS:    CONSTITUTIONAL: No weakness, fevers or chills  RESPIRATORY: No cough, wheezing, hemoptysis; pos  shortness of breath  CARDIOVASCULAR: No chest pain or palpitations  GASTROINTESTINAL: No abdominal or epigastric pain. No nausea, vomiting,     No diarrhea or constipation. pos  melena                                                               07-29    144  |  109<H>  |  21  ----------------------------<  107<H>  3.8   |  20<L>  |  1.63<H>    Ca    8.9      29 Jul 2019 07:07        CAPILLARY BLOOD GLUCOSE          Vital Signs Last 24 Hrs  T(C): 36.6 (29 Jul 2019 13:29), Max: 37.2 (29 Jul 2019 04:58)  T(F): 97.9 (29 Jul 2019 13:29), Max: 99 (29 Jul 2019 04:58)  HR: 62 (29 Jul 2019 13:29) (62 - 87)  BP: 142/46 (29 Jul 2019 13:29) (113/86 - 159/61)  BP(mean): --  RR: 17 (29 Jul 2019 13:29) (17 - 19)  SpO2: 96% (29 Jul 2019 13:29) (94% - 98%)                              PHYSICAL EXAM:    Constitutional: NAD  HEENT: conjunctive   clear   Neck:  No JVD  Respiratory: decrease bs b/l   Cardiovascular: S1 and S2  Gastrointestinal: BS+, soft, NT/ND  Extremities: trace  peripheral edema  Neurological:  no focal deficits  Psychiatric: normal affect  Skin: dry  Access: Not applicable

## 2019-07-29 NOTE — PROGRESS NOTE ADULT - SUBJECTIVE AND OBJECTIVE BOX
INTERVAL HPI/OVERNIGHT EVENTS:    pt seen and examined. No overnight events     MEDICATIONS  (STANDING):  carbidopa/levodopa  25/100 1 Tablet(s) Oral three times a day  heparin  Injectable 5000 Unit(s) SubCutaneous every 12 hours  latanoprost 0.005% Ophthalmic Solution 1 Drop(s) Both EYES at bedtime  melatonin 3 milliGRAM(s) Oral at bedtime  QUEtiapine 25 milliGRAM(s) Oral at bedtime  sodium bicarbonate 650 milliGRAM(s) Oral two times a day  sodium chloride 0.9%. 1000 milliLiter(s) (40 mL/Hr) IV Continuous <Continuous>  timolol 0.25% Solution 1 Drop(s) Both EYES two times a day    MEDICATIONS  (PRN):  acetaminophen   Tablet .. 650 milliGRAM(s) Oral every 6 hours PRN Temp greater or equal to 38C (100.4F), Mild Pain (1 - 3), Moderate Pain (4 - 6)      Allergies    No Known Allergies    Intolerances        Review of Systems:    General:  No wt loss, fevers, chills, night sweats,fatigue,   Eyes:  Good vision, no reported pain  ENT:  No sore throat, pain, runny nose, dysphagia  CV:  No pain, palpitations, hypo/hypertension  Resp:  No dyspnea, cough, tachypnea, wheezing  GI:  No pain, No nausea, No vomiting, No diarrhea, No constipation, No weight loss, No fever, No pruritis, No rectal bleeding, No melena, No dysphagia  :  No pain, bleeding, incontinence, nocturia  Muscle:  No pain, weakness  Neuro:  No weakness, tingling, memory problems  Psych:  No fatigue, insomnia, mood problems, depression  Endocrine:  No polyuria, polydypsia, cold/heat intolerance  Heme:  No petechiae, ecchymosis, easy bruisability  Skin:  No rash, tattoos, scars, edema      Vital Signs Last 24 Hrs  T(C): 37.2 (29 Jul 2019 04:58), Max: 37.2 (29 Jul 2019 04:58)  T(F): 99 (29 Jul 2019 04:58), Max: 99 (29 Jul 2019 04:58)  HR: 67 (29 Jul 2019 04:58) (64 - 87)  BP: 144/72 (29 Jul 2019 04:58) (113/86 - 159/61)  BP(mean): --  RR: 18 (29 Jul 2019 04:58) (18 - 19)  SpO2: 98% (29 Jul 2019 04:58) (94% - 98%)    PHYSICAL EXAM:    Constitutional: NAD, well-developed  HEENT: EOMI, throat clear  Neck: No LAD, supple  Respiratory: CTA and P  Cardiovascular: S1 and S2, RRR, no M  Gastrointestinal: BS+, soft, NT/ND, neg HSM,  Extremities: No peripheral edema, neg clubbing, cyanosis  Vascular: 2+ peripheral pulses  Neurological: A/O x 0, confused   Skin: No rashes      LABS:    07-29    144  |  109<H>  |  21  ----------------------------<  107<H>  3.8   |  20<L>  |  1.63<H>    Ca    8.9      29 Jul 2019 07:07            RADIOLOGY & ADDITIONAL TESTS:

## 2019-07-30 DIAGNOSIS — F05 DELIRIUM DUE TO KNOWN PHYSIOLOGICAL CONDITION: ICD-10-CM

## 2019-07-30 LAB
ANION GAP SERPL CALC-SCNC: 20 MMOL/L — HIGH (ref 5–17)
BUN SERPL-MCNC: 23 MG/DL — SIGNIFICANT CHANGE UP (ref 7–23)
CALCIUM SERPL-MCNC: 8.9 MG/DL — SIGNIFICANT CHANGE UP (ref 8.4–10.5)
CHLORIDE SERPL-SCNC: 108 MMOL/L — SIGNIFICANT CHANGE UP (ref 96–108)
CO2 SERPL-SCNC: 16 MMOL/L — LOW (ref 22–31)
CREAT SERPL-MCNC: 1.85 MG/DL — HIGH (ref 0.5–1.3)
GLUCOSE SERPL-MCNC: 131 MG/DL — HIGH (ref 70–99)
HCT VFR BLD CALC: 30.7 % — LOW (ref 39–50)
HGB BLD-MCNC: 9.5 G/DL — LOW (ref 13–17)
MCHC RBC-ENTMCNC: 30.7 PG — SIGNIFICANT CHANGE UP (ref 27–34)
MCHC RBC-ENTMCNC: 30.9 GM/DL — LOW (ref 32–36)
MCV RBC AUTO: 99.4 FL — SIGNIFICANT CHANGE UP (ref 80–100)
PLATELET # BLD AUTO: 206 K/UL — SIGNIFICANT CHANGE UP (ref 150–400)
POTASSIUM SERPL-MCNC: 3.8 MMOL/L — SIGNIFICANT CHANGE UP (ref 3.5–5.3)
POTASSIUM SERPL-SCNC: 3.8 MMOL/L — SIGNIFICANT CHANGE UP (ref 3.5–5.3)
RBC # BLD: 3.09 M/UL — LOW (ref 4.2–5.8)
RBC # FLD: 14.8 % — HIGH (ref 10.3–14.5)
SODIUM SERPL-SCNC: 144 MMOL/L — SIGNIFICANT CHANGE UP (ref 135–145)
WBC # BLD: 8.59 K/UL — SIGNIFICANT CHANGE UP (ref 3.8–10.5)
WBC # FLD AUTO: 8.59 K/UL — SIGNIFICANT CHANGE UP (ref 3.8–10.5)

## 2019-07-30 PROCEDURE — 99232 SBSQ HOSP IP/OBS MODERATE 35: CPT

## 2019-07-30 RX ADMIN — HEPARIN SODIUM 5000 UNIT(S): 5000 INJECTION INTRAVENOUS; SUBCUTANEOUS at 17:43

## 2019-07-30 RX ADMIN — CARBIDOPA AND LEVODOPA 1 TABLET(S): 25; 100 TABLET ORAL at 17:43

## 2019-07-30 RX ADMIN — LATANOPROST 1 DROP(S): 0.05 SOLUTION/ DROPS OPHTHALMIC; TOPICAL at 21:24

## 2019-07-30 RX ADMIN — HEPARIN SODIUM 5000 UNIT(S): 5000 INJECTION INTRAVENOUS; SUBCUTANEOUS at 05:45

## 2019-07-30 RX ADMIN — Medication 1 DROP(S): at 05:45

## 2019-07-30 RX ADMIN — Medication 650 MILLIGRAM(S): at 05:44

## 2019-07-30 RX ADMIN — Medication 81 MILLIGRAM(S): at 17:43

## 2019-07-30 RX ADMIN — Medication 1 DROP(S): at 17:43

## 2019-07-30 RX ADMIN — CARBIDOPA AND LEVODOPA 1 TABLET(S): 25; 100 TABLET ORAL at 21:24

## 2019-07-30 RX ADMIN — Medication 3 MILLIGRAM(S): at 21:24

## 2019-07-30 RX ADMIN — CARBIDOPA AND LEVODOPA 1 TABLET(S): 25; 100 TABLET ORAL at 05:44

## 2019-07-30 NOTE — PROGRESS NOTE BEHAVIORAL HEALTH - NSBHCHARTREVIEWVS_PSY_A_CORE FT
T(C): 36.8 (07-30-19 @ 11:05), Max: 37.2 (07-30-19 @ 05:36)  HR: 65 (07-30-19 @ 11:05) (65 - 107)  BP: 147/71 (07-30-19 @ 11:05) (120/71 - 167/86)  RR: 18 (07-30-19 @ 11:05) (18 - 18)  SpO2: 98% (07-30-19 @ 11:05) (94% - 98%)  Wt(kg): --

## 2019-07-30 NOTE — CONSULT NOTE ADULT - CONSULT REQUESTED DATE/TIME
15-Jul-2019 08:30
16-Jul-2019
19-Jul-2019 14:09
19-Jul-2019 16:56
26-Jul-2019 13:41
30-Jul-2019 10:31
15-Jul-2019 15:04

## 2019-07-30 NOTE — PROGRESS NOTE ADULT - PROBLEM SELECTOR PLAN 1
virtual colon is negative  no further gi bleed  if stable h/h no GI contraindication to asa   long discussion with family, decision made to forgo upper gastrointestinal endoscopy at this time given improvement with proton pump inhibitor and holding a/c   Regular diet  dc planning

## 2019-07-30 NOTE — PROGRESS NOTE BEHAVIORAL HEALTH - NSBHFUPINTERVALHXFT_PSY_A_CORE
Psych f/u requested for delirium and lethargy possibly 2/2 Seroquel.  On interview, pt is lethargic, oriented x1 only, unable to give the president or say where he is.  No behavioral events reported per staff.  Spoke with son Abdifatah who states pt was living independently in group home right up until admission, managing his own ADLs and meds, but with some mild memory loss (for example, children had started taking over bills).  States this is an acute departure from baseline.

## 2019-07-30 NOTE — PROGRESS NOTE ADULT - ASSESSMENT
83 yo male Acoustic neuroma ,[place] , ,Parkinson Dz, unsteady gait ,  Chronic kidney disease  ,Gout  ,HTN (hypertension)   ,UC (ulcerative colitis) , resident of [facility] brought for eval of SOB and black stools for several days. Found to have anemia and admitted for hemotransfusion and GI workup .Patient had colonoscopy 3 years ago and [place] and had some polyps removed ,he denies any recent hx of GIB or GI workup ,but admits black stools and lightheadedness ,sob ,worsening last few days .Found to have RANJANA elevation and seen by cardiologist Admitted  to telemetry unit for monitoring , send 3 sets of cardiac ensymes to rule out coronary event, obtain ECHO to evaluate LVEF, cardiology consult ,continue current management, O2 supply, anticoagulation plan as per cardiology  ,aggrenox placed on hold until GI clearance will be obtained Nephrology cons called ,IV hydration is administrated ,lisinopril held as per cardiologist recommendation . (15 Jul 2019 08:57)

## 2019-07-30 NOTE — CONSULT NOTE ADULT - ASSESSMENT
84m w/ parkinsons, ulcerative colitis (untreated) a/w dark stools and anemia    also noted troponin elevation, acute on chronic renal failure    Holding aggrenox for time being   Protonix 40mg Iv BID   for echo today, would anticipate CT a/p with oral contrast to further evaluate   - clear liquids   no plan for egd unless evidence for ongoing /active bleeding   check CBC after 2 units of prbc today  d/w son at length.
The patient is an 84 year old male with a history of HTN, UC, TIAs, Parkinson's who presents with GI bleed, TITA, NSTEMI.    Plan:  - ECG with above noted ST depressions  - Troponin elevated at 4.260. Cannot exclude plaque rupture/thrombus at this time, although presumably elevated cardiac enzymes due to chronic CAD with severe anemia and TITA. Continue to trend until trending down.  - Check echocardiogram  - Hold all antiplatelet and anticoagulants  - Not a candidate for medical or invasive therapy for NSTEMI due to ongoing bleed and TITA  - Hold lisinopril. Consider alternative therapy due to TITA/CKD.  - Continue atorvastatin 20 mg daily  - GI eval  - Transfuse. Trend CBCs.  - Continue IV fluids
This is a 84y year old Male with the below past medical history significant for PD who presents with the chief complaint of anemia needed transfusion, with SOB and black stools has been in hospital for 2 weeks now and prior in [facility]. concern for hospital delerium / worsening dementia hx of need for seroquel in past for psychosis , now seems to be too sedating. patient is very lethragic and difficult to arouse by staff, witnessed yesterday 10 seconds of whole body tremor and back to baseline mental status immediately. some visual hallucinations reported. moved from [place] to now lives in [facility]. unsteady gait at baseline. takes sinemet 25/100mg TID and klonopin 0.5mg at home  ct head on admission unremarkable    nonfocal neuro exam, no rigidity or tremors noted, patient answers few questions appropriately. seems to be very lethargic, possibly sedated from seroquel used for hospital delerium/psychosis    low suspicion of seizure    worsening dementia in prolonged hospitalization  -avoid delerium triggering meds, absolutely no Haldol  -reorient frequently  -psych for behavior management/ hallucination  -pan cultured negative, ?encephalopathy from medical illness/anemia  -continue home parkinson's meds same dose  -dvt prophylaxis  -oob w/ assist when able, PT fall precautions
83 yo male Acoustic neuroma ,[place] , ,Parkinson Dz, unsteady gait ,  Chronic kidney disease  ,Gout  ,HTN (hypertension)   ,UC (ulcerative colitis) , resident of [facility] brought for eval of SOB and black stools for several days. Found to have anemia and admitted for hemotransfusion and GI workup .Patient had colonoscopy 3 years ago and [place] and had some polyps removed ,he denies any recent hx of GIB or GI workup ,but admits black stools and lightheadedness ,sob ,worsening last few days .Found to have RANJANA elevation and seen by cardiologist Admitted  to telemetry unit for monitoring , send 3 sets of cardiac ensymes to rule out coronary event, obtain ECHO to evaluate LVEF, cardiology consult ,continue current management, O2 supply, anticoagulation plan as per cardiology  ,aggrenox placed on hold until GI clearance will be obtained Nephrology cons called ,IV hydration is administrated ,lisinopril held as per cardiologist recommendation . (15 Jul 2019 08:57)
Patient description                                84m w/ parkinsons, acoustic neuroma hytn  ulcerative colitis (untreated) a/w dark stools and anemia also noted troponin elevation, acute on chronic renal failure   troponins now >8, received 2 unit prbc   Holding aggrenox  GI flood planned for 7/17 Pt [facility] to [facility]/16/2019 and Pulm criticaal care consulted 7/16/2019     Hospital course   7/16/2019 pulm consulted  Tr to [facility] for boarder   7/15/2019 2 u prbc given               PROBLEM/ASSESSMENT/RECOMMENDATIONS (A/R)       COPD   7/15/2019 CXR mild prominence is markings bases Flat diaphragm Emphysema Tr blunting cp angles   A/R Cl stable   MI   7/15-7/16 Tr 1 5.7-8.2-9.2   A/R On statins Cannot use ac or acei for gi bleed and tita   HYPERTENSION   7/16/2019 160/76  CHF   7/15/2019 echo n lvsf Possible inferoseptal inferior lateral wall hypokinesia dd1 mild mr   A/R Monitor volume status       GI BLEED   7/15 sob pos   ULCERATIVE COLITIS   LESION COLON   7/16/2019 ctap oc 1) No hydro 2) No obstrn 3) lih 4) two areas of pptential narrowing lesion v spasm r colon colonoscopy recommended   A/R GI flood in progress  ANEMIA   7/15-7/15-7/16-7/16 Hb 6.7-7.6-8.0 -8.6  7/16/2019 Retic 4.5 Fe 27 (d) %sat 9 (d) uibc 279 b12 fa n   A/R Monitor Hb Transfuse if below 7  TRASNFUSIONS   7/15/2019 2 u prbc   TITA   7/15-7/16 Cr 2.7-2.3  A/R Monitor serially ( Np hydro on ctap 7/15)       TIME SPENT Over 55 minutes aggregate care time spent on encounter; activities included   direct pt care, counseling and/or coordinating care reviewing notes, lab data/ imaging , discussion with multidisciplinary team/ pt /family. Risks, benefits, alternatives  discussed in det

## 2019-07-30 NOTE — PROGRESS NOTE BEHAVIORAL HEALTH - NSBHCONSULTMEDS_PSY_A_CORE FT
1. No standing psych meds for now.    2. PRN: Seroquel 12.5mg PO q6h PRN agitation; Zyprexa 1.25mg IM q6h PRN severe agitation.  Monitor for QTc<500.  Melatonin 3mg PO qHS PRN insomnia.    3. Minimize use of benzos, opioids, anticholinergics, or other deliriogenic agents when possible.  Maintain sleep wake cycle.  Provide frequent reorientation and redirection.  Family member at bedside if possible. Assess for need for glasses and hearing aid (if applicable).    4. Pt does not meet criteria for psychiatric hospitalization.  Recommend outpt psych f/u at [facility] after d/c- [phone].   [facility] will follow.

## 2019-07-30 NOTE — PROGRESS NOTE ADULT - SUBJECTIVE AND OBJECTIVE BOX
Patient is a 84y Male whom presented to the hospital with ckd and pb    pt seen  and examined  in am  , nad , no fever   PAST MEDICAL & SURGICAL HISTORY:  UC (ulcerative colitis)  HTN (hypertension)  UC (ulcerative colitis)  HTN (hypertension)  Gout  Chronic kidney disease  Acoustic neuroma  No significant past surgical history  No significant past surgical history      MEDICATIONS  (STANDING):  atorvastatin 20 milliGRAM(s) Oral at bedtime  carbidopa/levodopa  25/100 1 Tablet(s) Oral three times a day  clonazePAM  Tablet 0.5 milliGRAM(s) Oral at bedtime  docusate sodium 100 milliGRAM(s) Oral two times a day  lactobacillus acidophilus 1 Tablet(s) Oral daily  latanoprost 0.005% Ophthalmic Solution 1 Drop(s) Both EYES at bedtime  multivitamin 1 Tablet(s) Oral daily  pantoprazole  Injectable 40 milliGRAM(s) IV Push every 12 hours  sodium chloride 0.9%. 1000 milliLiter(s) (75 mL/Hr) IV Continuous <Continuous>  sulfaSALAzine 500 milliGRAM(s) Oral two times a day  timolol 0.25% Solution 1 Drop(s) Both EYES two times a day      Allergies    No Known Allergies    Intolerances        SOCIAL HISTORY:  Denies ETOh,Smoking,     FAMILY HISTORY:      REVIEW OF SYSTEMS:    CONSTITUTIONAL: No weakness, fevers or chills  RESPIRATORY: No cough, wheezing, hemoptysis; pos  shortness of breath  CARDIOVASCULAR: No chest pain or palpitations  GASTROINTESTINAL: No abdominal or epigastric pain. No nausea, vomiting,     No diarrhea or constipation. pos  melena                                                                             9.5    8.59  )-----------( 206      ( 30 Jul 2019 08:31 )             30.7       CBC Full  -  ( 30 Jul 2019 08:31 )  WBC Count : 8.59 K/uL  RBC Count : 3.09 M/uL  Hemoglobin : 9.5 g/dL  Hematocrit : 30.7 %  Platelet Count - Automated : 206 K/uL  Mean Cell Volume : 99.4 fl  Mean Cell Hemoglobin : 30.7 pg  Mean Cell Hemoglobin Concentration : 30.9 gm/dL  Auto Neutrophil # : x  Auto Lymphocyte # : x  Auto Monocyte # : x  Auto Eosinophil # : x  Auto Basophil # : x  Auto Neutrophil % : x  Auto Lymphocyte % : x  Auto Monocyte % : x  Auto Eosinophil % : x  Auto Basophil % : x      07-30    144  |  108  |  23  ----------------------------<  131<H>  3.8   |  16<L>  |  1.85<H>    Ca    8.9      30 Jul 2019 06:39        CAPILLARY BLOOD GLUCOSE          Vital Signs Last 24 Hrs  T(C): 36.8 (30 Jul 2019 11:05), Max: 37.2 (30 Jul 2019 05:36)  T(F): 98.2 (30 Jul 2019 11:05), Max: 98.9 (30 Jul 2019 05:36)  HR: 65 (30 Jul 2019 11:05) (65 - 98)  BP: 147/71 (30 Jul 2019 11:05) (120/71 - 147/71)  BP(mean): --  RR: 18 (30 Jul 2019 11:05) (18 - 18)  SpO2: 98% (30 Jul 2019 11:05) (94% - 98%)                                PHYSICAL EXAM:    Constitutional: NAD  HEENT: conjunctive   clear   Neck:  No JVD  Respiratory: decrease bs b/l   Cardiovascular: S1 and S2  Gastrointestinal: BS+, soft, NT/ND  Extremities: trace  peripheral edema  Neurological:  no focal deficits  Psychiatric: normal affect  Skin: dry  Access: Not applicable

## 2019-07-30 NOTE — PROGRESS NOTE BEHAVIORAL HEALTH - NSBHCHARTREVIEWLAB_PSY_A_CORE FT
9.5    8.59  )-----------( 206      ( 30 Jul 2019 08:31 )             30.7     07-30    144  |  108  |  23  ----------------------------<  131<H>  3.8   |  16<L>  |  1.85<H>    Ca    8.9      30 Jul 2019 06:39

## 2019-07-30 NOTE — PROGRESS NOTE ADULT - ASSESSMENT
Problem/Plan - 1:  ·  Problem: GIB (gastrointestinal bleeding).    - f/u CBC  - GI FU   - virtual colonoscopy reviewed    Problem/Plan - 2:  ·  Problem: Troponin level elevated.   - 2/2 to possible  NSTEMI -  - tele monitor  - cards fu     Problem/Plan - 3:  ·  Problem: Abnormal CT of the abdomen.    -  virtual colonoscopy with no colonic mass     Problem/Plan - 4:·  Problem: Anemia.   - monitor cbc  - transfuse PRN      Problem/Plan - 5:  ·  Problem: UC (ulcerative colitis).    - continue home medications  - gi following    - c/w PPI    Problem/Plan - 6:  AMS unknown etiology ? hospital acquired delirium  urine and urine culture negative  chest x ray NAPD  blood culture x 2 sets NGTD  rvp negative  - w/u negative  - seen by neurology    Problem/Plan - 7:  sinus pause  loresoor d/c'ed  seen by EP  no PPM at this time    Problem/Plan - 8:  TITA/CKD improved  - s/p iv fluids    d/c planning

## 2019-07-30 NOTE — CONSULT NOTE ADULT - SUBJECTIVE AND OBJECTIVE BOX
Admitting Diagnosis:  Hemorrhage of rectum and anus (K62.5): HEMORRHAGE OF ANUS AND RECTUM      HPI:  This is a 84y year old Male with the below past medical history significant for PD who presents with the chief complaint of anemia needed transfusion, with SOB and black stools has been in hospital for 2 weeks now and prior in [facility]. concern for hospital delerium / worsening dementia hx of need for seroquel in past for psychosis , now seems to be too sedating. patient is very lethragic and difficult to arouse by staff, witnessed yesterday 10 seconds of whole body tremor and back to baseline mental status immediately. some visual hallucinations reported. moved from [place] to now lives in [facility]. unsteady gait at baseline. 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)      Past Surgical History:  No significant past surgical history (725272676)  No significant past surgical history ([id number])      Social History:  No toxic habits    Family History:  FAMILY HISTORY:      Allergies:  No Known Allergies      ROS:  Constitutional: Patient offers no complaints of fevers or significant weight loss  Ears, Nose, Mouth and Throat: The patient presents with no abnormalities of the head, ears, eyes, nose or throat  Skin: Patient offers no concerns of new rashes or lesions  Respiratory: The patient presents with no abnormalities of the respiratory tract  Cardiovascular: The patient presents with no cardiac abnormalities  Gastrointestinal: The patient presents with no abnormalities of the GI system  Genitourinary: The patient presents with no dysuria, hematuria or frequent urination  Neurological: See HPI  Endocrine: Patient offers no complaints of excessive thirst, urination, or heat/cold intolerance    Advanced care planning reviewed and noted in the chart.    Medications:  acetaminophen   Tablet .. 650 milliGRAM(s) Oral every 6 hours PRN  aspirin  chewable 81 milliGRAM(s) Oral daily  carbidopa/levodopa  25/100 1 Tablet(s) Oral three times a day  heparin  Injectable 5000 Unit(s) SubCutaneous every 12 hours  latanoprost 0.005% Ophthalmic Solution 1 Drop(s) Both EYES at bedtime  melatonin 3 milliGRAM(s) Oral at bedtime  timolol 0.25% Solution 1 Drop(s) Both EYES two times a day      Labs:  CBC Full  -  ( 30 Jul 2019 08:31 )  WBC Count : 8.59 K/uL  RBC Count : 3.09 M/uL  Hemoglobin : 9.5 g/dL  Hematocrit : 30.7 %  Platelet Count - Automated : 206 K/uL  Mean Cell Volume : 99.4 fl  Mean Cell Hemoglobin : 30.7 pg  Mean Cell Hemoglobin Concentration : 30.9 gm/dL  Auto Neutrophil # : x  Auto Lymphocyte # : x  Auto Monocyte # : x  Auto Eosinophil # : x  Auto Basophil # : x  Auto Neutrophil % : x  Auto Lymphocyte % : x  Auto Monocyte % : x  Auto Eosinophil % : x  Auto Basophil % : x    07-30    144  |  108  |  23  ----------------------------<  131<H>  3.8   |  16<L>  |  1.85<H>    Ca    8.9      30 Jul 2019 06:39      CAPILLARY BLOOD GLUCOSE              Male    Vitals:  Vital Signs Last 24 Hrs  T(C): 37.2 (30 Jul 2019 05:36), Max: 37.2 (30 Jul 2019 05:36)  T(F): 98.9 (30 Jul 2019 05:36), Max: 98.9 (30 Jul 2019 05:36)  HR: 85 (30 Jul 2019 05:36) (62 - 107)  BP: 120/71 (30 Jul 2019 05:36) (120/71 - 167/86)  BP(mean): --  RR: 18 (30 Jul 2019 05:36) (17 - 18)  SpO2: 97% (30 Jul 2019 05:36) (94% - 97%)    NEUROLOGICAL EXAM:    Mental status: sleeping, arouses to voice , no eye opening, on persistence, slight opens briefly. answers few words appropriate , states hospital and name. difficult to maintain alertness    Cranial Nerves: Pupils were equal, round, reactive to light. 4-5mmb/l  Extraocular movements were intact. Visual field were full. Fundoscopic exam was deferred. Facial sensation was intact to light touch. There was no facial asymmetry. The palate was upgoing symmetrically and tongue was midline. Hearing acuity was intact to finger rub AU. Shoulder shrug was full bilaterally    Motor exam: Bulk and tone were normal. cannot power test, patient un cooperative. seen moving all 4 limbs spontanously , small movements  not full ROM x 4. withdrew x 4 to light touch stimulation    Reflexes: 2+ in the bilateral upper extremities. 2+ in the bilateral lower extremities. Toes were downgoing bilaterally.     Sensation: Intact to light touch, temperature, vibration b/l Ue and Le    Coordination: unable to test, no tremors noted.     Gait: deferred, patient too lethargic.

## 2019-07-30 NOTE — PROGRESS NOTE ADULT - SUBJECTIVE AND OBJECTIVE BOX
INTERVAL HPI/OVERNIGHT EVENTS:    pt seen and examined. No overnight events     MEDICATIONS  (STANDING):  aspirin  chewable 81 milliGRAM(s) Oral daily  carbidopa/levodopa  25/100 1 Tablet(s) Oral three times a day  heparin  Injectable 5000 Unit(s) SubCutaneous every 12 hours  latanoprost 0.005% Ophthalmic Solution 1 Drop(s) Both EYES at bedtime  melatonin 3 milliGRAM(s) Oral at bedtime  timolol 0.25% Solution 1 Drop(s) Both EYES two times a day    MEDICATIONS  (PRN):  acetaminophen   Tablet .. 650 milliGRAM(s) Oral every 6 hours PRN Temp greater or equal to 38C (100.4F), Mild Pain (1 - 3), Moderate Pain (4 - 6)      Allergies    No Known Allergies    Intolerances        Review of Systems:    General:  No wt loss, fevers, chills, night sweats,fatigue,   Eyes:  Good vision, no reported pain  ENT:  No sore throat, pain, runny nose, dysphagia  CV:  No pain, palpitations, hypo/hypertension  Resp:  No dyspnea, cough, tachypnea, wheezing  GI:  No pain, No nausea, No vomiting, No diarrhea, No constipation, No weight loss, No fever, No pruritis, No rectal bleeding, No melena, No dysphagia  :  No pain, bleeding, incontinence, nocturia  Muscle:  No pain, weakness  Neuro:  No weakness, tingling, memory problems  Psych:  No fatigue, insomnia, mood problems, depression  Endocrine:  No polyuria, polydypsia, cold/heat intolerance  Heme:  No petechiae, ecchymosis, easy bruisability  Skin:  No rash, tattoos, scars, edema      Vital Signs Last 24 Hrs  T(C): 36.8 (30 Jul 2019 11:05), Max: 37.2 (30 Jul 2019 05:36)  T(F): 98.2 (30 Jul 2019 11:05), Max: 98.9 (30 Jul 2019 05:36)  HR: 65 (30 Jul 2019 11:05) (62 - 107)  BP: 147/71 (30 Jul 2019 11:05) (120/71 - 167/86)  BP(mean): --  RR: 18 (30 Jul 2019 11:05) (17 - 18)  SpO2: 98% (30 Jul 2019 11:05) (94% - 98%)    PHYSICAL EXAM:    Constitutional: NAD, well-developed  HEENT: EOMI, throat clear  Neck: No LAD, supple  Respiratory: CTA and P  Cardiovascular: S1 and S2, RRR, no M  Gastrointestinal: BS+, soft, NT/ND, neg HSM,  Extremities: No peripheral edema, neg clubbing, cyanosis  Vascular: 2+ peripheral pulses  Neurological: A/O x 0, confused   Skin: No rashes    LABS:                        9.5    8.59  )-----------( 206      ( 30 Jul 2019 08:31 )             30.7     07-30    144  |  108  |  23  ----------------------------<  131<H>  3.8   |  16<L>  |  1.85<H>    Ca    8.9      30 Jul 2019 06:39            RADIOLOGY & ADDITIONAL TESTS:

## 2019-07-30 NOTE — PROGRESS NOTE BEHAVIORAL HEALTH - AXIS III
Acoustic neuroma ,[place] , ,Parkinson Dz, unsteady gait ,  Chronic kidney disease  ,Gout  ,HTN (hypertension)   ,UC (ulcerative colitis)

## 2019-07-30 NOTE — PROGRESS NOTE ADULT - SUBJECTIVE AND OBJECTIVE BOX
EP ATTENDING    tele: NSR 60s    no palpitations, no syncope, no angina    acetaminophen   Tablet .. 650 milliGRAM(s) Oral every 6 hours PRN  aspirin  chewable 81 milliGRAM(s) Oral daily  carbidopa/levodopa  25/100 1 Tablet(s) Oral three times a day  heparin  Injectable 5000 Unit(s) SubCutaneous every 12 hours  latanoprost 0.005% Ophthalmic Solution 1 Drop(s) Both EYES at bedtime  melatonin 3 milliGRAM(s) Oral at bedtime  timolol 0.25% Solution 1 Drop(s) Both EYES two times a day                            9.5    8.59  )-----------( 206      ( 30 Jul 2019 08:31 )             30.7       07-30    144  |  108  |  23  ----------------------------<  131<H>  3.8   |  16<L>  |  1.85<H>    Ca    8.9      30 Jul 2019 06:39              T(C): 36.8 (07-30-19 @ 11:05), Max: 37.2 (07-30-19 @ 05:36)  HR: 65 (07-30-19 @ 11:05) (62 - 107)  BP: 147/71 (07-30-19 @ 11:05) (120/71 - 167/86)  RR: 18 (07-30-19 @ 11:05) (17 - 18)  SpO2: 98% (07-30-19 @ 11:05) (94% - 98%)  Wt(kg): --    no JVD  RRR, no murmurs  CTAB  soft nt/nd  no c/c/e      A/P) He is a pleasant 83 y/o male admitted almost two weeks ago with melena. EP is called because last Thursday he had asymptomatic 3-4.5 second sinus pauses on tele after a beta blocker was started for a NSTEMI. His LVEF and TSH are normal. His QRS is narrow. He denies syncope nor presyncope.    -no more beta blockers  -no need for PPM at this time as patient asymptomatic and the etiology of the bradycardia is reversible  -no further inpatient EP workup needed as long as tele remains unremarkable      Lupe Cid M.D., FHRS  Cardiac Electrophysiology  [facility]  [address]  [place] [id number]  [email or web address]    office [phone]  pager [phone]

## 2019-07-30 NOTE — PROGRESS NOTE BEHAVIORAL HEALTH - NSBHCHARTREVIEWINVESTIGATE_PSY_A_CORE FT
< from: 12 Lead ECG (07.25.19 @ 09:42) >      Ventricular Rate 58 BPM    Atrial Rate 58 BPM    P-R Interval 110 ms    QRS Duration 140 ms    Q-T Interval 500 ms    QTC Calculation(Bezet) 490 ms    P Axis 30 degrees    R Axis 102 degrees    T Axis 201 degrees    Diagnosis Line SINUS BRADYCARDIAWITH SHORT FL  RIGHTWARD AXIS  NON-SPECIFIC INTRA-VENTRICULAR CONDUCTION BLOCK  CANNOT RULE OUT INFERIOR INFARCT , AGE UNDETERMINED  T WAVE ABNORMALITY, CONSIDER ANTEROLATERAL ISCHEMIA  ABNORMAL ECG    Confirmed by MD NOAH, NEFTALY ([id number]) on 7/26/2019 1:37:16 PM    < end of copied text >

## 2019-07-30 NOTE — PROGRESS NOTE ADULT - SUBJECTIVE AND OBJECTIVE BOX
S: Confused. Barely follows commands. No distress. Denies chest pain or SOB.      MEDICATIONS  (STANDING):  aspirin  chewable 81 milliGRAM(s) Oral daily  carbidopa/levodopa  25/100 1 Tablet(s) Oral three times a day  heparin  Injectable 5000 Unit(s) SubCutaneous every 12 hours  latanoprost 0.005% Ophthalmic Solution 1 Drop(s) Both EYES at bedtime  melatonin 3 milliGRAM(s) Oral at bedtime  timolol 0.25% Solution 1 Drop(s) Both EYES two times a day    MEDICATIONS  (PRN):  acetaminophen   Tablet .. 650 milliGRAM(s) Oral every 6 hours PRN Temp greater or equal to 38C (100.4F), Mild Pain (1 - 3), Moderate Pain (4 - 6)      LABS:                            9.5    8.59  )-----------( 206      ( 30 Jul 2019 08:31 )             30.7     Hemoglobin: 9.5 g/dL (07-30 @ 08:31)    07-30    144  |  108  |  23  ----------------------------<  131<H>  3.8   |  16<L>  |  1.85<H>    Ca    8.9      30 Jul 2019 06:39      Creatinine Trend: 1.85<--, 1.63<--, 1.68<--, 2.17<--, 2.88<--, 2.07<--           PHYSICAL EXAM  Vital Signs Last 24 Hrs  T(C): 36.8 (30 Jul 2019 11:05), Max: 37.2 (30 Jul 2019 05:36)  T(F): 98.2 (30 Jul 2019 11:05), Max: 98.9 (30 Jul 2019 05:36)  HR: 65 (30 Jul 2019 11:05) (62 - 107)  BP: 147/71 (30 Jul 2019 11:05) (120/71 - 167/86)  BP(mean): --  RR: 18 (30 Jul 2019 11:05) (17 - 18)  SpO2: 98% (30 Jul 2019 11:05) (94% - 98%)      Gen: Appears well in NAD  HEENT:  (-)icterus (-)pallor  CV: N S1 S2 1/6 CIARRA (+)2 Pulses B/l  Resp:  Clear to auscultation B/L, normal effort  GI: (+) BS Soft, NT, ND  Lymph:  (-)Edema, (-)obvious lymphadenopathy  Skin: Warm to touch, Normal turgor  Psych: Appropriate mood and affect but confused      TELEMETRY: SR 60-70    ECG:  < from: 12 Lead ECG (07.16.19 @ 18:01) >  Diagnosis Line Normal sinus rhythm  Right bundle branch block  Possible Inferior infarct , age undetermined  Abnormal ECG  When compared with ECG of 15-JUL-2019 06:43,  No significant change was found    < end of copied text >    < from: TTE with Doppler (w/Cont) (07.23.19 @ 18:39) >  Conclusions:  1. Mitral annular calcification and calcified mitral  leaflets with normal diastolic opening.  2. Calcified trileaflet aortic valve with normal opening.  3. Normal left ventricular internal dimensions and wall  thicknesses.  4. Despite the use of ultrasound enhancing agent, the  endocardium is not well visualized; grossly normal left  ventricular systolic function. Unable to accurately assess  for wall motion abnormalities.  5. Normal diastolic function  6. The right ventricle is not well visualized; grossly  normal right ventricular systolic function.  *** No previous Echo exam.    < end of copied text >    < from: CT Head No Cont (07.23.19 @ 16:48) >  IMPRESSION:  Age appropriate involutional change. Microvascular ischemic   change    < end of copied text >      ASSESSMENT/PLAN: 83 yo M with history of HTN, CKD, UC, Parkinson's disease who is being seen for elevated troponin.    - Patient with no chest pain or anginal symptoms    - Suspect elevated troponin are due to demand ischemia from GIB and renal disease  - TTE noted above - normal LV/RV function  - Continue statin, ASA 81mg (clearance by GI appreciated)  - F/u GI - no further intervention  - Sinus pauses noted on tele 7/25 - patient asymptomatic, BP stable - Hold AVN blockers, monitor for further episodes  - EP eval appreciated  - AMS w/u per primary team - f/u neuro  - Will setup patient to follow up in our office upon discharge    Arnoldo Birch PA-C  [facility]  Pager: [phone]

## 2019-07-30 NOTE — PROGRESS NOTE BEHAVIORAL HEALTH - SUMMARY
Patient is an 84 year old man, domiciled at [facility] of Acoustic neuroma ,[place] , ,Parkinson Dz, unsteady gait ,  Chronic kidney disease  ,Gout  ,HTN (hypertension)   ,UC (ulcerative colitis), admitted for GI bleed and hemotransfusion, psychiatry consulted for acute change in mental status.  Patient was disoriented and unable to answer any questions that were asked. Per team, patient had an acute change in mental status yesterday. CT demonstrated microvascular changes, no elevation in WBC, no fever.  Sx c/w delirium 2/2 GMC.  Also concern for convulsive episode per primary team; recommend EEG and to f/u neurology recs.

## 2019-07-30 NOTE — PROGRESS NOTE ADULT - SUBJECTIVE AND OBJECTIVE BOX
Patient is a 84y old  Male who presents with a chief complaint of SOB , anemia ,black stools (30 Jul 2019 12:09)      SUBJECTIVE / OVERNIGHT EVENTS:  lethargic but arousable    MEDICATIONS  (STANDING):  aspirin  chewable 81 milliGRAM(s) Oral daily  carbidopa/levodopa  25/100 1 Tablet(s) Oral three times a day  heparin  Injectable 5000 Unit(s) SubCutaneous every 12 hours  latanoprost 0.005% Ophthalmic Solution 1 Drop(s) Both EYES at bedtime  melatonin 3 milliGRAM(s) Oral at bedtime  timolol 0.25% Solution 1 Drop(s) Both EYES two times a day    MEDICATIONS  (PRN):  acetaminophen   Tablet .. 650 milliGRAM(s) Oral every 6 hours PRN Temp greater or equal to 38C (100.4F), Mild Pain (1 - 3), Moderate Pain (4 - 6)      Vital Signs Last 24 Hrs  T(C): 36.8 (30 Jul 2019 11:05), Max: 37.2 (30 Jul 2019 05:36)  T(F): 98.2 (30 Jul 2019 11:05), Max: 98.9 (30 Jul 2019 05:36)  HR: 65 (30 Jul 2019 11:05) (62 - 107)  BP: 147/71 (30 Jul 2019 11:05) (120/71 - 167/86)  BP(mean): --  RR: 18 (30 Jul 2019 11:05) (17 - 18)  SpO2: 98% (30 Jul 2019 11:05) (94% - 98%)  CAPILLARY BLOOD GLUCOSE        I&O's Summary    29 Jul 2019 07:01  -  30 Jul 2019 07:00  --------------------------------------------------------  IN: 360 mL / OUT: 200 mL / NET: 160 mL        PHYSICAL EXAM:  GENERAL: NAD, well-developed  HEAD:  Atraumatic, Normocephalic  EYES: EOMI, PERRLA, conjunctiva and sclera clear  NECK: Supple, No JVD  CHEST/LUNG: Clear to auscultation bilaterally; No wheeze  HEART: Regular rate and rhythm; No murmurs, rubs, or gallops  ABDOMEN: Soft, Nontender, Nondistended; Bowel sounds present  EXTREMITIES:  2+ Peripheral Pulses, No clubbing, cyanosis, or edema  PSYCH: AAAury  NEUROLOGY: non-focal  SKIN: No rashes or lesions    LABS:                        9.5    8.59  )-----------( 206      ( 30 Jul 2019 08:31 )             30.7     07-30    144  |  108  |  23  ----------------------------<  131<H>  3.8   |  16<L>  |  1.85<H>    Ca    8.9      30 Jul 2019 06:39                RADIOLOGY & ADDITIONAL TESTS:    Imaging Personally Reviewed:    Consultant(s) Notes Reviewed:      Care Discussed with Consultants/Other Providers:

## 2019-07-30 NOTE — CONSULT NOTE ADULT - REASON FOR ADMISSION
SOB , anemia ,black stools

## 2019-07-31 VITALS
RESPIRATION RATE: 18 BRPM | SYSTOLIC BLOOD PRESSURE: 137 MMHG | OXYGEN SATURATION: 96 % | TEMPERATURE: 99 F | DIASTOLIC BLOOD PRESSURE: 68 MMHG | HEART RATE: 64 BPM

## 2019-07-31 LAB
ANION GAP SERPL CALC-SCNC: 18 MMOL/L — HIGH (ref 5–17)
BUN SERPL-MCNC: 24 MG/DL — HIGH (ref 7–23)
CALCIUM SERPL-MCNC: 8.9 MG/DL — SIGNIFICANT CHANGE UP (ref 8.4–10.5)
CHLORIDE SERPL-SCNC: 106 MMOL/L — SIGNIFICANT CHANGE UP (ref 96–108)
CO2 SERPL-SCNC: 19 MMOL/L — LOW (ref 22–31)
CREAT SERPL-MCNC: 1.79 MG/DL — HIGH (ref 0.5–1.3)
GLUCOSE SERPL-MCNC: 113 MG/DL — HIGH (ref 70–99)
HCT VFR BLD CALC: 32.8 % — LOW (ref 39–50)
HGB BLD-MCNC: 9.8 G/DL — LOW (ref 13–17)
MCHC RBC-ENTMCNC: 29.9 GM/DL — LOW (ref 32–36)
MCHC RBC-ENTMCNC: 30.6 PG — SIGNIFICANT CHANGE UP (ref 27–34)
MCV RBC AUTO: 102.5 FL — HIGH (ref 80–100)
PLATELET # BLD AUTO: 192 K/UL — SIGNIFICANT CHANGE UP (ref 150–400)
POTASSIUM SERPL-MCNC: 4 MMOL/L — SIGNIFICANT CHANGE UP (ref 3.5–5.3)
POTASSIUM SERPL-SCNC: 4 MMOL/L — SIGNIFICANT CHANGE UP (ref 3.5–5.3)
RBC # BLD: 3.2 M/UL — LOW (ref 4.2–5.8)
RBC # FLD: 15 % — HIGH (ref 10.3–14.5)
SODIUM SERPL-SCNC: 143 MMOL/L — SIGNIFICANT CHANGE UP (ref 135–145)
WBC # BLD: 8.37 K/UL — SIGNIFICANT CHANGE UP (ref 3.8–10.5)
WBC # FLD AUTO: 8.37 K/UL — SIGNIFICANT CHANGE UP (ref 3.8–10.5)

## 2019-07-31 PROCEDURE — 74176 CT ABD & PELVIS W/O CONTRAST: CPT

## 2019-07-31 PROCEDURE — 84484 ASSAY OF TROPONIN QUANT: CPT

## 2019-07-31 PROCEDURE — 97116 GAIT TRAINING THERAPY: CPT

## 2019-07-31 PROCEDURE — 83540 ASSAY OF IRON: CPT

## 2019-07-31 PROCEDURE — 85045 AUTOMATED RETICULOCYTE COUNT: CPT

## 2019-07-31 PROCEDURE — 83880 ASSAY OF NATRIURETIC PEPTIDE: CPT

## 2019-07-31 PROCEDURE — 70450 CT HEAD/BRAIN W/O DYE: CPT

## 2019-07-31 PROCEDURE — 82553 CREATINE MB FRACTION: CPT

## 2019-07-31 PROCEDURE — 82378 CARCINOEMBRYONIC ANTIGEN: CPT

## 2019-07-31 PROCEDURE — 87040 BLOOD CULTURE FOR BACTERIA: CPT

## 2019-07-31 PROCEDURE — 85610 PROTHROMBIN TIME: CPT

## 2019-07-31 PROCEDURE — 82728 ASSAY OF FERRITIN: CPT

## 2019-07-31 PROCEDURE — 93306 TTE W/DOPPLER COMPLETE: CPT

## 2019-07-31 PROCEDURE — P9016: CPT

## 2019-07-31 PROCEDURE — 87633 RESP VIRUS 12-25 TARGETS: CPT

## 2019-07-31 PROCEDURE — 82746 ASSAY OF FOLIC ACID SERUM: CPT

## 2019-07-31 PROCEDURE — 87486 CHLMYD PNEUM DNA AMP PROBE: CPT

## 2019-07-31 PROCEDURE — 84443 ASSAY THYROID STIM HORMONE: CPT

## 2019-07-31 PROCEDURE — 86901 BLOOD TYPING SEROLOGIC RH(D): CPT

## 2019-07-31 PROCEDURE — 82272 OCCULT BLD FECES 1-3 TESTS: CPT

## 2019-07-31 PROCEDURE — 36415 COLL VENOUS BLD VENIPUNCTURE: CPT

## 2019-07-31 PROCEDURE — 93005 ELECTROCARDIOGRAM TRACING: CPT

## 2019-07-31 PROCEDURE — 82607 VITAMIN B-12: CPT

## 2019-07-31 PROCEDURE — 83550 IRON BINDING TEST: CPT

## 2019-07-31 PROCEDURE — C8929: CPT

## 2019-07-31 PROCEDURE — 87798 DETECT AGENT NOS DNA AMP: CPT

## 2019-07-31 PROCEDURE — 82550 ASSAY OF CK (CPK): CPT

## 2019-07-31 PROCEDURE — 99285 EMERGENCY DEPT VISIT HI MDM: CPT | Mod: 25

## 2019-07-31 PROCEDURE — 86923 COMPATIBILITY TEST ELECTRIC: CPT

## 2019-07-31 PROCEDURE — 82962 GLUCOSE BLOOD TEST: CPT

## 2019-07-31 PROCEDURE — 80053 COMPREHEN METABOLIC PANEL: CPT

## 2019-07-31 PROCEDURE — 85027 COMPLETE CBC AUTOMATED: CPT

## 2019-07-31 PROCEDURE — 97110 THERAPEUTIC EXERCISES: CPT

## 2019-07-31 PROCEDURE — 85014 HEMATOCRIT: CPT

## 2019-07-31 PROCEDURE — 85018 HEMOGLOBIN: CPT

## 2019-07-31 PROCEDURE — 81001 URINALYSIS AUTO W/SCOPE: CPT

## 2019-07-31 PROCEDURE — 97161 PT EVAL LOW COMPLEX 20 MIN: CPT

## 2019-07-31 PROCEDURE — 74263 CT COLONOGRAPHY SCREENING: CPT

## 2019-07-31 PROCEDURE — 71045 X-RAY EXAM CHEST 1 VIEW: CPT

## 2019-07-31 PROCEDURE — 86850 RBC ANTIBODY SCREEN: CPT

## 2019-07-31 PROCEDURE — 86900 BLOOD TYPING SEROLOGIC ABO: CPT

## 2019-07-31 PROCEDURE — 80048 BASIC METABOLIC PNL TOTAL CA: CPT

## 2019-07-31 PROCEDURE — 84100 ASSAY OF PHOSPHORUS: CPT

## 2019-07-31 PROCEDURE — 87581 M.PNEUMON DNA AMP PROBE: CPT

## 2019-07-31 PROCEDURE — 84466 ASSAY OF TRANSFERRIN: CPT

## 2019-07-31 PROCEDURE — 83735 ASSAY OF MAGNESIUM: CPT

## 2019-07-31 PROCEDURE — 85730 THROMBOPLASTIN TIME PARTIAL: CPT

## 2019-07-31 RX ORDER — LACTOBACILLUS ACIDOPHILUS 100MM CELL
1 CAPSULE ORAL
Qty: 0 | Refills: 0 | DISCHARGE

## 2019-07-31 RX ORDER — MULTIVIT-MIN/FERROUS GLUCONATE 9 MG/15 ML
1 LIQUID (ML) ORAL
Qty: 0 | Refills: 0 | DISCHARGE

## 2019-07-31 RX ADMIN — Medication 1 DROP(S): at 05:44

## 2019-07-31 RX ADMIN — Medication 81 MILLIGRAM(S): at 12:46

## 2019-07-31 RX ADMIN — HEPARIN SODIUM 5000 UNIT(S): 5000 INJECTION INTRAVENOUS; SUBCUTANEOUS at 05:44

## 2019-07-31 RX ADMIN — CARBIDOPA AND LEVODOPA 1 TABLET(S): 25; 100 TABLET ORAL at 05:44

## 2019-07-31 RX ADMIN — CARBIDOPA AND LEVODOPA 1 TABLET(S): 25; 100 TABLET ORAL at 14:47

## 2019-07-31 NOTE — PROGRESS NOTE ADULT - ASSESSMENT
Problem/Plan - 1:  ·  Problem: GIB (gastrointestinal bleeding).    - f/u CBC  - GI FU   - virtual colonoscopy reviewed    Problem/Plan - 2:  ·  Problem: Troponin level elevated.   - 2/2 to possible  NSTEMI -  - tele monitor  - cards fu     Problem/Plan - 3:  ·  Problem: Abnormal CT of the abdomen.    -  virtual colonoscopy with no colonic mass     Problem/Plan - 4:·  Problem: Anemia.   - monitor cbc  - transfuse PRN      Problem/Plan - 5:  ·  Problem: UC (ulcerative colitis).    - continue home medications  - gi following    - c/w PPI    Problem/Plan - 6:  AMS likely hospital acquired delirium  urine and urine culture negative  chest x ray NAPD  blood culture x 2 sets NGTD  rvp negative  - w/u negative  - seen by neurology    Problem/Plan - 7:  sinus pause  loresoor d/c'ed  seen by EP  no PPM at this time    Problem/Plan - 8:  TITA/CKD improved  - s/p iv fluids    d/c planning

## 2019-07-31 NOTE — PROGRESS NOTE ADULT - PROBLEM SELECTOR PLAN 4
ANEMIA PLAN: Anemia of chronic disease:  We will monitor Iron stores, B12 and RBC folate .
2/2 to possible  NSTEMI -followed by cardiologist - Not a candidate for medical or invasive therapy for NSTEMI due to ongoing bleed and TITA .As per Dr Mccrary -cannot exclude plaque rupture/thrombus formation although more likely there is significant chronic CAD with demand from severe anemia and TITA.
ANEMIA PLAN: Anemia of chronic disease:  We will monitor Iron stores, B12 and RBC folate .
SERIAL H/H ,TRANSFUSE PRBS , ANEMIA WORKUP AND STOOL FOR FOBT ORDERED ,GI AND HEM CONS CALLED .HOLD AGGRENOX ,TELEMETRY MONITORING
SERIAL H/H ,TRANSFUSE PRBS , ANEMIA WORKUP AND STOOL FOR FOBT ORDERED ,GI AND HEM CONS CALLED .HOLD AGGRENOX ,TELEMETRY MONITORING
Consult GI in AM for plan regarding endoscopy/colonoscopy. Will need to evaluate area of narrowing of large bowel to assess for potential malignancy.  Patient also noted to have fusiform ectasia of abdominal aorta. Patient will need further interval monitoring for potentially developing aortic aneurysm. Patient should follow up with cardiology- if current concerns regarding anemia are addressed, he may benefit from starting aspirin in the future.

## 2019-07-31 NOTE — PROGRESS NOTE ADULT - ATTENDING COMMENTS
Agree with above  check TTE to evaluate LV function    Herson Page MD
CARDIOLOGY ATTENDING    Patient seen and examined. Agree with above. Awaiting on echo to decide on possible ischemia evaluation.
EP ATTENDING    Agree with above. No indication for PPM at this time.
EP ATTENDING    Agree with above. No indication for PPM at this time. Hold beta blockers given normal LVEF.
Patient seen and examined, agree with above assessment and plan as transcribed above.      - F/U EP eval [facility]  BEEPER [phone]
Patient seen and examined, agree with above assessment and plan as transcribed above.    - No need for further inpatient cardiac work up.    Greg May MD, [facility]  BEEPER [phone]
Patient seen and examined, agree with above assessment and plan as transcribed above.    - Start Lopressor 25 mg PO dialy  - ASA if/when ok with GI  - cont statin    Greg May MD, [facility]  BEEPER [phone]
Patient seen and examined, agree with above assessment and plan as transcribed above.    - had an asymptomatic 3 sec pause  - Clinically insignificant, discussed with Dr. Cid  - D/C planing    Greg May MD, [facility]  BEEPER [phone]
Patient seen and examined.  Agree with above.   check TTE  follow up GI    Herson Page MD
Patient assigned to me by night hospitalist in charge for management and care for patient for this evening only. Care to be resumed by day hospitalist in the morning and thereafter.
85 yo male Acoustic neuroma ,[place] , ,Parkinson Dz, unsteady gait ,  Chronic kidney disease  ,Gout  ,HTN (hypertension)   ,UC (ulcerative colitis) , resident of [facility] brought for eval of SOB and black stools for several days. Found to have anemia and admitted for hemotransfusion and GI workup .Patient had colonoscopy 3 years ago and [place] and had some polyps removed ,he denies any recent hx of GIB or GI workup ,but admits black stools and lightheadedness ,sob ,worsening last few days .Found to have RANJANA elevation and seen by cardiologist Admitted  to telemetry unit for monitoring , send 3 sets of cardiac ensymes to rule out coronary event, obtain ECHO to evaluate LVEF, cardiology consult ,continue current management, O2 supply, anticoagulation plan as per cardiology  ,aggrenox placed on hold until GI clearance will be obtained Nephrology cons called ,IV hydration is administrated ,lisinopril held as per cardiologist recommendation .
TRANSFER TO [facility] WHEN THE BED IS AVAILABLE .SPOKE TO THE TRANSFER CENTER AND ACCEPTING MD DR.DAVINDER PHILLIPS .D/C SUMMARY COMPLETED ELECTRONICALLY AND TRANSFER PACKAGE IS COMPLETED
TRANSFER TO [facility] WHEN THE BED IS AVAILABLE .SPOKE TO THE TRANSFER CENTER AND ACCEPTING MD DR.DAVINDER PHILLIPS .D/C SUMMARY COMPLETED ELECTRONICALLY AND TRANSFER PACKAGE IS COMPLETED

## 2019-07-31 NOTE — PROGRESS NOTE ADULT - PROBLEM SELECTOR PROBLEM 3
Rectal bleed
Rectal bleed
UC (ulcerative colitis)
Rectal bleed
Abnormal CT of the abdomen
Abnormal CT of the abdomen
GIB (gastrointestinal bleeding)
Rectal bleed

## 2019-07-31 NOTE — PROGRESS NOTE ADULT - SUBJECTIVE AND OBJECTIVE BOX
Patient is a 84y Male whom presented to the hospital with ckd and pb    pt seen  and examined  in am  , nad , no fever   PAST MEDICAL & SURGICAL HISTORY:  UC (ulcerative colitis)  HTN (hypertension)  UC (ulcerative colitis)  HTN (hypertension)  Gout  Chronic kidney disease  Acoustic neuroma  No significant past surgical history  No significant past surgical history      MEDICATIONS  (STANDING):  atorvastatin 20 milliGRAM(s) Oral at bedtime  carbidopa/levodopa  25/100 1 Tablet(s) Oral three times a day  clonazePAM  Tablet 0.5 milliGRAM(s) Oral at bedtime  docusate sodium 100 milliGRAM(s) Oral two times a day  lactobacillus acidophilus 1 Tablet(s) Oral daily  latanoprost 0.005% Ophthalmic Solution 1 Drop(s) Both EYES at bedtime  multivitamin 1 Tablet(s) Oral daily  pantoprazole  Injectable 40 milliGRAM(s) IV Push every 12 hours  sodium chloride 0.9%. 1000 milliLiter(s) (75 mL/Hr) IV Continuous <Continuous>  sulfaSALAzine 500 milliGRAM(s) Oral two times a day  timolol 0.25% Solution 1 Drop(s) Both EYES two times a day      Allergies    No Known Allergies    Intolerances        SOCIAL HISTORY:  Denies ETOh,Smoking,     FAMILY HISTORY:      REVIEW OF SYSTEMS:    CONSTITUTIONAL: No weakness, fevers or chills  RESPIRATORY: No cough, wheezing, hemoptysis; pos  shortness of breath  CARDIOVASCULAR: No chest pain or palpitations  GASTROINTESTINAL: No abdominal or epigastric pain. No nausea, vomiting,     No diarrhea or constipation. pos  melena                                                                         9.8    8.37  )-----------( 192      ( 31 Jul 2019 09:31 )             32.8       CBC Full  -  ( 31 Jul 2019 09:31 )  WBC Count : 8.37 K/uL  RBC Count : 3.20 M/uL  Hemoglobin : 9.8 g/dL  Hematocrit : 32.8 %  Platelet Count - Automated : 192 K/uL  Mean Cell Volume : 102.5 fl  Mean Cell Hemoglobin : 30.6 pg  Mean Cell Hemoglobin Concentration : 29.9 gm/dL  Auto Neutrophil # : x  Auto Lymphocyte # : x  Auto Monocyte # : x  Auto Eosinophil # : x  Auto Basophil # : x  Auto Neutrophil % : x  Auto Lymphocyte % : x  Auto Monocyte % : x  Auto Eosinophil % : x  Auto Basophil % : x      07-31    143  |  106  |  24<H>  ----------------------------<  113<H>  4.0   |  19<L>  |  1.79<H>    Ca    8.9      31 Jul 2019 07:05        CAPILLARY BLOOD GLUCOSE          Vital Signs Last 24 Hrs  T(C): 37.1 (31 Jul 2019 14:45), Max: 37.2 (31 Jul 2019 12:39)  T(F): 98.8 (31 Jul 2019 14:45), Max: 98.9 (31 Jul 2019 12:39)  HR: 64 (31 Jul 2019 14:45) (57 - 64)  BP: 137/68 (31 Jul 2019 14:45) (135/62 - 157/73)  BP(mean): --  RR: 18 (31 Jul 2019 14:45) (18 - 18)  SpO2: 96% (31 Jul 2019 14:45) (96% - 97%)                            PHYSICAL EXAM:    Constitutional: NAD  HEENT: conjunctive   clear   Neck:  No JVD  Respiratory: decrease bs b/l   Cardiovascular: S1 and S2  Gastrointestinal: BS+, soft, NT/ND  Extremities: trace  peripheral edema  Neurological:  no focal deficits  Psychiatric: normal affect  Skin: dry  Access: Not applicable

## 2019-07-31 NOTE — PROGRESS NOTE ADULT - PROBLEM SELECTOR PLAN 2
BP monitoring,continue current antihypertensive meds, low salt diet,followup with PMD in 1-2 weeks.
2/2 to possible  NSTEMI -followed by cardiologist - Not a candidate for medical or invasive therapy for NSTEMI due to ongoing bleed and TITA .As per Dr Mccrary -cannot exclude plaque rupture/thrombus formation although more likely there is significant chronic CAD with demand from severe anemia and TITA TRANSFER TO [facility] IS RECOMMENDED BY ANESTHESIOLOGIST , D/W CARD AND GI CONS ,ALL PHYSICIANS ARE IN AGREEMENT
2/2 to possible  NSTEMI -followed by cardiologist - Not a candidate for medical or invasive therapy for NSTEMI due to ongoing bleed and TITA .As per Dr Mccrary -cannot exclude plaque rupture/thrombus formation although more likely there is significant chronic CAD with demand from severe anemia and TITA TRANSFER TO [facility] IS RECOMMENDED BY ANESTHESIOLOGIST , D/W CARD AND GI CONS ,ALL PHYSICIANS ARE IN AGREEMENT
Advanced care planning was discussed with patient and family.  Advanced care planning forms were reviewed and discussed.  Risks, benefits and alternatives of gastroenterologic procedures were discussed in detail and all questions were answered.    30 minutes spent.
Advanced care planning was discussed with patient and family.  Advanced care planning forms were reviewed and discussed.  Risks, benefits and alternatives of gastroenterologic procedures were discussed in detail and all questions were answered.    30 minutes spent.
BP monitoring,continue current antihypertensive meds, low salt diet,followup with PMD in 1-2 weeks.
continue home medications ,seen by GI CONSULT ,continue PPI
Patient was found to have NSTEMI at [facility], however was not given antiplatelets due to bleeding concern. Lisinopril not given due to TITA and metoprolol was held due to report of 3.54 second pause.   Monitor on telemetry for now.  TTE found normal LV function, concern for possible hypokinesis of inferoseptal wall, inferior lateral wall.   Patient had been optimized by Dr. Francis Mccrary at [facility] for endoscopic workup.   Consult cardiology in AM.   Will increase atorvastatin to 80mg PO qd. Will likely require further ischemic workup while inpatient.

## 2019-07-31 NOTE — PROGRESS NOTE ADULT - REASON FOR ADMISSION
SOB , anemia ,black stools

## 2019-07-31 NOTE — PROGRESS NOTE ADULT - ASSESSMENT
83 yo male Acoustic neuroma ,[place] , ,Parkinson Dz, unsteady gait ,  Chronic kidney disease  ,Gout  ,HTN (hypertension)   ,UC (ulcerative colitis) , resident of [facility] brought for eval of SOB and black stools for several days. Found to have anemia and admitted for hemotransfusion and GI workup .Patient had colonoscopy 3 years ago and [place] and had some polyps removed ,he denies any recent hx of GIB or GI workup ,but admits black stools and lightheadedness ,sob ,worsening last few days .Found to have RANJANA elevation and seen by cardiologist Admitted  to telemetry unit for monitoring , send 3 sets of cardiac ensymes to rule out coronary event, obtain ECHO to evaluate LVEF, cardiology consult ,continue current management, O2 supply, anticoagulation plan as per cardiology  ,aggrenox placed on hold until GI clearance will be obtained Nephrology cons called ,IV hydration is administrated ,lisinopril held as per cardiologist recommendation . (15 Jul 2019 08:57)

## 2019-07-31 NOTE — PROGRESS NOTE ADULT - PROBLEM SELECTOR PROBLEM 1
Chronic kidney disease
Chronic kidney disease
Abnormal CT of the abdomen
Chronic kidney disease
GIB (gastrointestinal bleeding)
Abnormal CT of the abdomen
Anemia
Chronic kidney disease
GIB (gastrointestinal bleeding)
GIB (gastrointestinal bleeding)
Abnormal CT of the abdomen
Chronic kidney disease
Chronic kidney disease

## 2019-07-31 NOTE — PROGRESS NOTE ADULT - SUBJECTIVE AND OBJECTIVE BOX
Patient is a 84y old  Male who presents with a chief complaint of SOB , anemia ,black stools (31 Jul 2019 13:01)      SUBJECTIVE / OVERNIGHT EVENTS: remarkable improvement of mental status    MEDICATIONS  (STANDING):  aspirin  chewable 81 milliGRAM(s) Oral daily  carbidopa/levodopa  25/100 1 Tablet(s) Oral three times a day  heparin  Injectable 5000 Unit(s) SubCutaneous every 12 hours  latanoprost 0.005% Ophthalmic Solution 1 Drop(s) Both EYES at bedtime  melatonin 3 milliGRAM(s) Oral at bedtime  timolol 0.25% Solution 1 Drop(s) Both EYES two times a day    MEDICATIONS  (PRN):  acetaminophen   Tablet .. 650 milliGRAM(s) Oral every 6 hours PRN Temp greater or equal to 38C (100.4F), Mild Pain (1 - 3), Moderate Pain (4 - 6)      Vital Signs Last 24 Hrs  T(C): 37.2 (31 Jul 2019 12:39), Max: 37.2 (31 Jul 2019 12:39)  T(F): 98.9 (31 Jul 2019 12:39), Max: 98.9 (31 Jul 2019 12:39)  HR: 59 (31 Jul 2019 12:39) (57 - 60)  BP: 144/74 (31 Jul 2019 12:39) (135/62 - 157/73)  BP(mean): --  RR: 18 (31 Jul 2019 12:39) (18 - 18)  SpO2: 96% (31 Jul 2019 12:39) (96% - 97%)  CAPILLARY BLOOD GLUCOSE        I&O's Summary    30 Jul 2019 07:01  -  31 Jul 2019 07:00  --------------------------------------------------------  IN: 120 mL / OUT: 250 mL / NET: -130 mL    31 Jul 2019 07:01  -  31 Jul 2019 14:40  --------------------------------------------------------  IN: 0 mL / OUT: 150 mL / NET: -150 mL        PHYSICAL EXAM:  GENERAL: NAD, well-developed  HEAD:  Atraumatic, Normocephalic  EYES: EOMI, PERRLA, conjunctiva and sclera clear  NECK: Supple, No JVD  CHEST/LUNG: Clear to auscultation bilaterally; No wheeze  HEART: Regular rate and rhythm; No murmurs, rubs, or gallops  ABDOMEN: Soft, Nontender, Nondistended; Bowel sounds present  EXTREMITIES:  2+ Peripheral Pulses, No clubbing, cyanosis, or edema  PSYCH: AAOx3  NEUROLOGY: non-focal  SKIN: No rashes or lesions    LABS:                        9.8    8.37  )-----------( 192      ( 31 Jul 2019 09:31 )             32.8     07-31    143  |  106  |  24<H>  ----------------------------<  113<H>  4.0   |  19<L>  |  1.79<H>    Ca    8.9      31 Jul 2019 07:05                RADIOLOGY & ADDITIONAL TESTS:    Imaging Personally Reviewed:    Consultant(s) Notes Reviewed:      Care Discussed with Consultants/Other Providers:

## 2019-07-31 NOTE — PROGRESS NOTE ADULT - SUBJECTIVE AND OBJECTIVE BOX
EP ATTENDING    tele: NSR 50-60s    no palpitations, no syncope, no angina    acetaminophen   Tablet .. 650 milliGRAM(s) Oral every 6 hours PRN  aspirin  chewable 81 milliGRAM(s) Oral daily  carbidopa/levodopa  25/100 1 Tablet(s) Oral three times a day  heparin  Injectable 5000 Unit(s) SubCutaneous every 12 hours  latanoprost 0.005% Ophthalmic Solution 1 Drop(s) Both EYES at bedtime  melatonin 3 milliGRAM(s) Oral at bedtime  timolol 0.25% Solution 1 Drop(s) Both EYES two times a day                            9.8    8.37  )-----------( 192      ( 31 Jul 2019 09:31 )             32.8       07-31    143  |  106  |  24<H>  ----------------------------<  113<H>  4.0   |  19<L>  |  1.79<H>    Ca    8.9      31 Jul 2019 07:05        T(C): 37.2 (07-31-19 @ 12:39), Max: 37.2 (07-31-19 @ 12:39)  HR: 59 (07-31-19 @ 12:39) (57 - 60)  BP: 144/74 (07-31-19 @ 12:39) (135/62 - 157/73)  RR: 18 (07-31-19 @ 12:39) (18 - 18)  SpO2: 96% (07-31-19 @ 12:39) (96% - 97%)  Wt(kg): --    no JVD  RRR, no murmurs  CTAB  soft nt/nd  no c/c/e      A/P) He is a pleasant 85 y/o male admitted almost two weeks ago with melena. EP is called because last Thursday he had asymptomatic 3-4.5 second sinus pauses on tele after a beta blocker was started for a NSTEMI. His LVEF and TSH are normal. His QRS is narrow. He denies syncope nor presyncope.    -no more beta blockers  -no need for PPM at this time as patient asymptomatic and the etiology of the bradycardia is reversible  -no further inpatient EP workup needed as long as tele remains unremarkable      Lupe Cid M.D., [facility]  Cardiac Electrophysiology  [facility]  [address] [facility], [address] [place] [address]  [email or web address]    office [phone]  pager [phone]

## 2019-07-31 NOTE — PROGRESS NOTE ADULT - PROBLEM SELECTOR PROBLEM 4
Other megaloblastic anemia
Other megaloblastic anemia
Abnormal CT of the abdomen
Other megaloblastic anemia
Anemia
Anemia
Other megaloblastic anemia
Troponin level elevated

## 2019-07-31 NOTE — PROGRESS NOTE ADULT - SUBJECTIVE AND OBJECTIVE BOX
INTERVAL HPI/OVERNIGHT EVENTS:    pt seen and examined with son at bedside. He denies abdominal pain, n/v. Tolerating PO well.     MEDICATIONS  (STANDING):  aspirin  chewable 81 milliGRAM(s) Oral daily  carbidopa/levodopa  25/100 1 Tablet(s) Oral three times a day  heparin  Injectable 5000 Unit(s) SubCutaneous every 12 hours  latanoprost 0.005% Ophthalmic Solution 1 Drop(s) Both EYES at bedtime  melatonin 3 milliGRAM(s) Oral at bedtime  timolol 0.25% Solution 1 Drop(s) Both EYES two times a day    MEDICATIONS  (PRN):  acetaminophen   Tablet .. 650 milliGRAM(s) Oral every 6 hours PRN Temp greater or equal to 38C (100.4F), Mild Pain (1 - 3), Moderate Pain (4 - 6)      Allergies    No Known Allergies    Intolerances        Review of Systems:    General:  No wt loss, fevers, chills, night sweats,fatigue,   Eyes:  Good vision, no reported pain  ENT:  No sore throat, pain, runny nose, dysphagia  CV:  No pain, palpitations, hypo/hypertension  Resp:  No dyspnea, cough, tachypnea, wheezing  GI:  No pain, No nausea, No vomiting, No diarrhea, No constipation, No weight loss, No fever, No pruritis, No rectal bleeding, No melena, No dysphagia  :  No pain, bleeding, incontinence, nocturia  Muscle:  No pain, weakness  Neuro:  No weakness, tingling, memory problems  Psych:  No fatigue, insomnia, mood problems, depression  Endocrine:  No polyuria, polydypsia, cold/heat intolerance  Heme:  No petechiae, ecchymosis, easy bruisability  Skin:  No rash, tattoos, scars, edema      Vital Signs Last 24 Hrs  T(C): 36.8 (31 Jul 2019 05:49), Max: 36.9 (30 Jul 2019 20:52)  T(F): 98.3 (31 Jul 2019 05:49), Max: 98.4 (30 Jul 2019 20:52)  HR: 60 (31 Jul 2019 05:49) (57 - 65)  BP: 157/73 (31 Jul 2019 05:49) (135/62 - 157/73)  BP(mean): --  RR: 18 (31 Jul 2019 05:49) (18 - 18)  SpO2: 97% (31 Jul 2019 05:49) (97% - 98%)    PHYSICAL EXAM:    Constitutional: NAD, well-developed  HEENT: EOMI, throat clear  Neck: No LAD, supple  Respiratory: CTA and P  Cardiovascular: S1 and S2, RRR, no M  Gastrointestinal: BS+, soft, NT/ND, neg HSM,  Extremities: No peripheral edema, neg clubbing, cyanosis  Vascular: 2+ peripheral pulses  Neurological: A/O x 0, confused   Skin: No rashes      LABS:                        9.8    8.37  )-----------( 192      ( 31 Jul 2019 09:31 )             32.8     07-31    143  |  106  |  24<H>  ----------------------------<  113<H>  4.0   |  19<L>  |  1.79<H>    Ca    8.9      31 Jul 2019 07:05            RADIOLOGY & ADDITIONAL TESTS:

## 2019-07-31 NOTE — PROGRESS NOTE ADULT - PROBLEM SELECTOR PLAN 3
f/u with cbc transfused prbc as needed.
REPORT WAS D/W WILLIAM VALDEZ ,HE IS AWARE THAT PATIENT WILL REQUIRE COLONOSCOPY
REPORT WAS D/W WILLIAM VALDEZ ,HE IS AWARE THAT PATIENT WILL REQUIRE COLONOSCOPY
SERIAL CBC , PII BID , IV FLUIDS , GI CONSULT , HOLD AGGRENOX ,MONITOR H/H CLOSELY , S/p  2 U OF PRBCS ,EGD planned by GI consult
f/u with cbc transfused prbc as needed.
Patient reports he has not take sulfasalazine for several years because his UC has not been active.   Sulfasalazine was listed as an ongoing medication on last order reconciliation.  Will continue sulfasalazine for now. Will determine with GI in AM whether it should be continued.

## 2019-07-31 NOTE — PROGRESS NOTE ADULT - PROVIDER SPECIALTY LIST ADULT
Cardiology
Critical Care
Critical Care
Electrophysiology
Gastroenterology
Hospitalist
Internal Medicine
Nephrology
Cardiology
Electrophysiology
Gastroenterology
Internal Medicine
Gastroenterology
Nephrology
Internal Medicine

## 2019-07-31 NOTE — PROGRESS NOTE ADULT - SUBJECTIVE AND OBJECTIVE BOX
S: Less confused. More awake today, able to answer some questions appropriately. Denies chest pain or SOB.        MEDICATIONS  (STANDING):  aspirin  chewable 81 milliGRAM(s) Oral daily  carbidopa/levodopa  25/100 1 Tablet(s) Oral three times a day  heparin  Injectable 5000 Unit(s) SubCutaneous every 12 hours  latanoprost 0.005% Ophthalmic Solution 1 Drop(s) Both EYES at bedtime  melatonin 3 milliGRAM(s) Oral at bedtime  timolol 0.25% Solution 1 Drop(s) Both EYES two times a day    MEDICATIONS  (PRN):  acetaminophen   Tablet .. 650 milliGRAM(s) Oral every 6 hours PRN Temp greater or equal to 38C (100.4F), Mild Pain (1 - 3), Moderate Pain (4 - 6)      LABS:                            9.8    8.37  )-----------( 192      ( 31 Jul 2019 09:31 )             32.8     Hemoglobin: 9.8 g/dL (07-31 @ 09:31)  Hemoglobin: 9.5 g/dL (07-30 @ 08:31)    07-31    143  |  106  |  24<H>  ----------------------------<  113<H>  4.0   |  19<L>  |  1.79<H>    Ca    8.9      31 Jul 2019 07:05      Creatinine Trend: 1.79<--, 1.85<--, 1.63<--, 1.68<--, 2.17<--, 2.88<--           PHYSICAL EXAM  Vital Signs Last 24 Hrs  T(C): 36.8 (31 Jul 2019 05:49), Max: 36.9 (30 Jul 2019 20:52)  T(F): 98.3 (31 Jul 2019 05:49), Max: 98.4 (30 Jul 2019 20:52)  HR: 60 (31 Jul 2019 05:49) (57 - 60)  BP: 157/73 (31 Jul 2019 05:49) (135/62 - 157/73)  BP(mean): --  RR: 18 (31 Jul 2019 05:49) (18 - 18)  SpO2: 97% (31 Jul 2019 05:49) (97% - 97%)      Gen: Appears well in NAD  HEENT:  (-)icterus (-)pallor  CV: N S1 S2 1/6 CIARRA (+)2 Pulses B/l  Resp:  Clear to auscultation B/L, normal effort  GI: (+) BS Soft, NT, ND  Lymph:  (-)Edema, (-)obvious lymphadenopathy  Skin: Warm to touch, Normal turgor  Psych: Appropriate mood and affect but confused      TELEMETRY: SB/SR 50-70    ECG:  < from: 12 Lead ECG (07.16.19 @ 18:01) >  Diagnosis Line Normal sinus rhythm  Right bundle branch block  Possible Inferior infarct , age undetermined  Abnormal ECG  When compared with ECG of 15-JUL-2019 06:43,  No significant change was found    < end of copied text >    < from: TTE with Doppler (w/Cont) (07.23.19 @ 18:39) >  Conclusions:  1. Mitral annular calcification and calcified mitral  leaflets with normal diastolic opening.  2. Calcified trileaflet aortic valve with normal opening.  3. Normal left ventricular internal dimensions and wall  thicknesses.  4. Despite the use of ultrasound enhancing agent, the  endocardium is not well visualized; grossly normal left  ventricular systolic function. Unable to accurately assess  for wall motion abnormalities.  5. Normal diastolic function  6. The right ventricle is not well visualized; grossly  normal right ventricular systolic function.  *** No previous Echo exam.    < end of copied text >    < from: CT Head No Cont (07.23.19 @ 16:48) >  IMPRESSION:  Age appropriate involutional change. Microvascular ischemic   change    < end of copied text >      ASSESSMENT/PLAN: 83 yo M with history of HTN, CKD, UC, Parkinson's disease who is being seen for elevated troponin.    - Patient with no chest pain or anginal symptoms    - Suspect elevated troponin are due to demand ischemia from GIB and renal disease  - TTE noted above - normal LV/RV function  - Continue statin, ASA 81mg (clearance by GI appreciated)  - F/u GI - no further intervention  - Sinus pauses noted on tele 7/25 - patient asymptomatic, BP stable - Hold AVN blockers, monitor for further episodes  - EP eval appreciated  - AMS w/u per primary team - neurology/psych following  - No further inpatient cardiac w/u needed at this time  - Will setup patient to follow up in our office upon discharge    Arnoldo Birch PA-C  [facility]  Pager: [phone] S: Less confused. More awake today, able to answer some questions appropriately. Denies chest pain or SOB.        MEDICATIONS  (STANDING):  aspirin  chewable 81 milliGRAM(s) Oral daily  carbidopa/levodopa  25/100 1 Tablet(s) Oral three times a day  heparin  Injectable 5000 Unit(s) SubCutaneous every 12 hours  latanoprost 0.005% Ophthalmic Solution 1 Drop(s) Both EYES at bedtime  melatonin 3 milliGRAM(s) Oral at bedtime  timolol 0.25% Solution 1 Drop(s) Both EYES two times a day    MEDICATIONS  (PRN):  acetaminophen   Tablet .. 650 milliGRAM(s) Oral every 6 hours PRN Temp greater or equal to 38C (100.4F), Mild Pain (1 - 3), Moderate Pain (4 - 6)      LABS:                            9.8    8.37  )-----------( 192      ( 31 Jul 2019 09:31 )             32.8     Hemoglobin: 9.8 g/dL (07-31 @ 09:31)  Hemoglobin: 9.5 g/dL (07-30 @ 08:31)    07-31    143  |  106  |  24<H>  ----------------------------<  113<H>  4.0   |  19<L>  |  1.79<H>    Ca    8.9      31 Jul 2019 07:05      Creatinine Trend: 1.79<--, 1.85<--, 1.63<--, 1.68<--, 2.17<--, 2.88<--           PHYSICAL EXAM  Vital Signs Last 24 Hrs  T(C): 36.8 (31 Jul 2019 05:49), Max: 36.9 (30 Jul 2019 20:52)  T(F): 98.3 (31 Jul 2019 05:49), Max: 98.4 (30 Jul 2019 20:52)  HR: 60 (31 Jul 2019 05:49) (57 - 60)  BP: 157/73 (31 Jul 2019 05:49) (135/62 - 157/73)  BP(mean): --  RR: 18 (31 Jul 2019 05:49) (18 - 18)  SpO2: 97% (31 Jul 2019 05:49) (97% - 97%)      Gen: Appears well in NAD  HEENT:  (-)icterus (-)pallor  CV: N S1 S2 1/6 CIARRA (+)2 Pulses B/l  Resp:  Clear to auscultation B/L, normal effort  GI: (+) BS Soft, NT, ND  Lymph:  (-)Edema, (-)obvious lymphadenopathy  Skin: Warm to touch, Normal turgor  Psych: Appropriate mood and affect but confused      TELEMETRY: SB/SR 50-70    ECG:  < from: 12 Lead ECG (07.16.19 @ 18:01) >  Diagnosis Line Normal sinus rhythm  Right bundle branch block  Possible Inferior infarct , age undetermined  Abnormal ECG  When compared with ECG of 15-JUL-2019 06:43,  No significant change was found    < end of copied text >    < from: TTE with Doppler (w/Cont) (07.23.19 @ 18:39) >  Conclusions:  1. Mitral annular calcification and calcified mitral  leaflets with normal diastolic opening.  2. Calcified trileaflet aortic valve with normal opening.  3. Normal left ventricular internal dimensions and wall  thicknesses.  4. Despite the use of ultrasound enhancing agent, the  endocardium is not well visualized; grossly normal left  ventricular systolic function. Unable to accurately assess  for wall motion abnormalities.  5. Normal diastolic function  6. The right ventricle is not well visualized; grossly  normal right ventricular systolic function.  *** No previous Echo exam.    < end of copied text >    < from: CT Head No Cont (07.23.19 @ 16:48) >  IMPRESSION:  Age appropriate involutional change. Microvascular ischemic   change    < end of copied text >      ASSESSMENT/PLAN: 85 yo M with history of HTN, CKD, UC, Parkinson's disease who is being seen for elevated troponin.    - Patient with no chest pain or anginal symptoms    - Suspect elevated troponin are due to demand ischemia from GIB and renal disease  - TTE noted above - normal LV/RV function  - Continue statin, ASA 81mg (clearance by GI appreciated)  - F/u GI - no further intervention  - Sinus pauses noted on tele 7/25 - patient asymptomatic, BP stable - Hold AVN blockers, monitor for further episodes  - EP eval appreciated  - AMS w/u per primary team - neurology/psych following  - No further inpatient cardiac w/u needed at this time  - Discussed with patient Son Abdifatah who will call our office to schedule a follow up appointment after rehab    Arnoldo Birch PA-C  [facility]  Pager: [phone]

## 2019-07-31 NOTE — CHART NOTE - NSCHARTNOTEFT_GEN_A_CORE
Request from Dr. Vásquez to facilitate patient discharge. Medication reconciliation reviewed with Dr. Vásquez who had medically cleared patient for discharge and to discharge on inpatient medications with follow-up as advised. Please refer to discharge note for detailed hospital course. Patient is currently stable for discharge to Rehab at this time.    Contact # [phone]

## 2019-07-31 NOTE — PROGRESS NOTE ADULT - PROBLEM SELECTOR PROBLEM 5
Gout
Gout
Acute kidney injury superimposed on chronic kidney disease
Gout
HTN (hypertension)
UC (ulcerative colitis)
UC (ulcerative colitis)

## 2019-07-31 NOTE — CHART NOTE - NSCHARTNOTEFT_GEN_A_CORE
Patient with a 3 sec pause on tele - Pt asymptomatic. Discussed with cardiology who spoke to EP Dr. Cid and no further intervention and is cleared to discharge. Dr. Vásquez made aware    [id number]

## 2019-07-31 NOTE — PROGRESS NOTE ADULT - PROBLEM SELECTOR PROBLEM 2
HTN (hypertension)
NSTEMI (non-ST elevated myocardial infarction)
HTN (hypertension)
ACP (advance care planning)
ACP (advance care planning)
HTN (hypertension)
Troponin level elevated
Troponin level elevated
UC (ulcerative colitis)
HTN (hypertension)

## 2019-09-04 NOTE — ED PROVIDER NOTE - OBJECTIVE STATEMENT
Render In Strict Bullet Format?: No
83 yo white male with telephone vs head last evening and then fell and now presents with headache and facial pain. No LOC and no neck pain. No paresthesia. No back or hip pain
Initiate Treatment: triamcinolone acetonide 0.1 % topical cream
Detail Level: Generalized

## 2019-11-09 ENCOUNTER — EMERGENCY (EMERGENCY)
Facility: HOSPITAL | Age: 84
LOS: 1 days | Discharge: HOME | End: 2019-11-09
Attending: EMERGENCY MEDICINE | Admitting: EMERGENCY MEDICINE
Payer: MEDICARE

## 2019-11-09 VITALS
HEIGHT: 70 IN | WEIGHT: 149.03 LBS | RESPIRATION RATE: 15 BRPM | DIASTOLIC BLOOD PRESSURE: 70 MMHG | OXYGEN SATURATION: 98 % | SYSTOLIC BLOOD PRESSURE: 132 MMHG | HEART RATE: 60 BPM | TEMPERATURE: 98 F

## 2019-11-09 PROCEDURE — 73562 X-RAY EXAM OF KNEE 3: CPT | Mod: 26,LT

## 2019-11-09 PROCEDURE — 99284 EMERGENCY DEPT VISIT MOD MDM: CPT

## 2019-11-09 PROCEDURE — 70450 CT HEAD/BRAIN W/O DYE: CPT | Mod: 26

## 2019-11-09 PROCEDURE — 73522 X-RAY EXAM HIPS BI 3-4 VIEWS: CPT | Mod: 26

## 2019-11-09 PROCEDURE — 12014 RPR F/E/E/N/L/M 5.1-7.5 CM: CPT

## 2019-11-09 RX ORDER — TETANUS TOXOID, REDUCED DIPHTHERIA TOXOID AND ACELLULAR PERTUSSIS VACCINE, ADSORBED 5; 2.5; 8; 8; 2.5 [IU]/.5ML; [IU]/.5ML; UG/.5ML; UG/.5ML; UG/.5ML
0.5 SUSPENSION INTRAMUSCULAR ONCE
Refills: 0 | Status: COMPLETED | OUTPATIENT
Start: 2019-11-09 | End: 2019-11-09

## 2019-11-09 RX ORDER — ACETAMINOPHEN 500 MG
650 TABLET ORAL ONCE
Refills: 0 | Status: COMPLETED | OUTPATIENT
Start: 2019-11-09 | End: 2019-11-09

## 2019-11-09 RX ADMIN — TETANUS TOXOID, REDUCED DIPHTHERIA TOXOID AND ACELLULAR PERTUSSIS VACCINE, ADSORBED 0.5 MILLILITER(S): 5; 2.5; 8; 8; 2.5 SUSPENSION INTRAMUSCULAR at 22:53

## 2019-11-09 NOTE — ED PROVIDER NOTE - PHYSICAL EXAMINATION
SPINE: c-spine: supple, no spinal tenderness. no midline tenderness. no paraspinal tenderness. no body step off. no deformity. no muscle spasm. FROM  ms r le:nontender from  me lle: left knee ttp with abrasion. from. sensation intact ble

## 2019-11-09 NOTE — ED ADULT NURSE NOTE - CHIEF COMPLAINT QUOTE
patient is brought in by EMS from [facility], s/p fall and hit his head to tv stand, patient is on [id number]

## 2019-11-09 NOTE — ED ADULT NURSE NOTE - CHPI ED NUR SYMPTOMS NEG
no loss of consciousness/no fever/no weakness/no confusion/no deformity/no numbness/no vomiting/no tingling

## 2019-11-09 NOTE — ED PROVIDER NOTE - CLINICAL SUMMARY MEDICAL DECISION MAKING FREE TEXT BOX
update tetanus, wound repair and ct head to r/o ich, x rays to r/o fx and pain control, attending to follow up

## 2019-11-09 NOTE — ED PROVIDER NOTE - PATIENT PORTAL LINK FT
You can access the FollowMyHealth Patient Portal offered by [facility] by registering at the following website: [email or web address]. By joining [employer]’s FollowMyHealth portal, you will also be able to view your health information using other applications (apps) compatible with our system.

## 2019-11-09 NOTE — ED PROVIDER NOTE - ATTENDING CONTRIBUTION TO CARE
Conrad Self MD: I have personally performed a face to face diagnostic evaluation on this patient.  I have reviewed the PA note and agree with the history, exam, and plan of care, except as noted.  History and Exam by me shows same findings as documented  Attending Note: Patient fell. Multiple facial lacs. No other c/o. Exam reveals lacs as described, FROM all joints, and non-focal neuro exam. Agree with plan

## 2019-11-09 NOTE — ED ADULT NURSE NOTE - OBJECTIVE STATEMENT
84 yr old male BIB EMS from [facility] c/o laceration to center of chin, laceration to center upper lip; skin tear and hematoma to right side forehead and abrasion to left knee s/p fall today; pt ambulating without walker and had a fall

## 2019-11-09 NOTE — ED PROVIDER NOTE - CONTEXT
Discontinue Regimen: Neutrogena rapid clear
Initiate Treatment: Sulfa wash daily\\nVeltin gel nightly ([facility], pt to give us a call and we can try to send locally, gave rebate card today in case)
Otc Regimen: Cetaphil or CeraVe cleanser and moisturizer
Detail Level: Simple
fall/known (describe)

## 2019-11-09 NOTE — ED PROCEDURE NOTE - ATTENDING CONTRIBUTION TO CARE
Conrad Self MD: I have personally performed a face to face diagnostic evaluation on this patient.  I have reviewed the PA note and agree with the history, exam, and plan of care, except as noted.  History and Exam by me shows same findings as documented  Attending Note: Wound closed

## 2019-11-09 NOTE — ED ADULT TRIAGE NOTE - CHIEF COMPLAINT QUOTE
patient is brought in by EMS from [facility], s/p fall and hit his head to tv stand, patient is on [id number]

## 2019-11-09 NOTE — ED PROVIDER NOTE - CARE PROVIDER_API CALL
Arun Jean (MD)  Surgery  [address] [place] [id number]  Phone: [phone]  Fax: [phone]  Follow Up Time:

## 2019-11-09 NOTE — ED ADULT NURSE NOTE - NSIMPLEMENTINTERV_GEN_ALL_ED
Implemented All Fall with Harm Risk Interventions:  [place] to call system. Call bell, personal items and telephone within reach. Instruct patient to call for assistance. Room bathroom lighting operational. Non-slip footwear when patient is off stretcher. Physically safe environment: no spills, clutter or unnecessary equipment. Stretcher in lowest position, wheels locked, appropriate side rails in place. Provide visual cue, wrist band, yellow gown, etc. Monitor gait and stability. Monitor for mental status changes and reorient to person, place, and time. Review medications for side effects contributing to fall risk. Reinforce activity limits and safety measures with patient and family. Provide visual clues: red socks.

## 2019-11-09 NOTE — ED PROVIDER NOTE - OBJECTIVE STATEMENT
pt Is a 85yo male with pmhx of parkinsons, htn, bib ems s/p fall. pt reports he lives at the [facility] and does not use his walker. pt reports he lost balance and tripped, falling hitting his head. unknown loc. pt reports multiple facial lacerations and knee pain. pt unsure if tetanus utd.

## 2019-11-10 VITALS
TEMPERATURE: 98 F | RESPIRATION RATE: 17 BRPM | HEART RATE: 64 BPM | OXYGEN SATURATION: 95 % | SYSTOLIC BLOOD PRESSURE: 167 MMHG | DIASTOLIC BLOOD PRESSURE: 73 MMHG

## 2019-11-10 PROCEDURE — 73522 X-RAY EXAM HIPS BI 3-4 VIEWS: CPT

## 2019-11-10 PROCEDURE — 90471 IMMUNIZATION ADMIN: CPT

## 2019-11-10 PROCEDURE — 12014 RPR F/E/E/N/L/M 5.1-7.5 CM: CPT

## 2019-11-10 PROCEDURE — 73700 CT LOWER EXTREMITY W/O DYE: CPT | Mod: 26,RT

## 2019-11-10 PROCEDURE — 99284 EMERGENCY DEPT VISIT MOD MDM: CPT | Mod: 25

## 2019-11-10 PROCEDURE — 70450 CT HEAD/BRAIN W/O DYE: CPT

## 2019-11-10 PROCEDURE — 73700 CT LOWER EXTREMITY W/O DYE: CPT

## 2019-11-10 PROCEDURE — 90715 TDAP VACCINE 7 YRS/> IM: CPT

## 2019-11-10 PROCEDURE — 73562 X-RAY EXAM OF KNEE 3: CPT

## 2019-11-10 RX ADMIN — Medication 650 MILLIGRAM(S): at 01:00

## 2019-11-10 RX ADMIN — Medication 650 MILLIGRAM(S): at 00:07

## 2020-06-04 NOTE — ED ADULT NURSE NOTE - CAS EDN DISCHARGE ASSESSMENT
June 6, 2020        Jese Joe   [address] [place] [id number]    This letter provides a written record that you were tested for COVID-19 on 6/4/20.   Your result was negative.    This means that we didn t find the virus that causes COVID-19 in your sample. A test may show negative when you do actually have the virus. This can happen when the virus is in the early stages of infection, before you feel illness symptoms.    Even if you don t have symptoms, they may still appear. For safety, it s very important to follow these rules.    Keep yourself away from others (self-isolation):      Stay home. Don t go to work, school or anywhere else.     Stay in your own room (and use your own bathroom), if you can.    Stay away from others in your home. No hugging, kissing or shaking hands. No visitors.    Clean  high touch  surfaces often (doorknobs, counters, handles, etc.). Use a household cleaning spray or wipes.    Cover your mouth and nose with a mask, tissue or washcloth to avoid spreading germs.    Wash your hands and face often with soap and water.    Stay in self-isolation until you meet ALL of the guidelines below:    1. You have had no fever for at least 72 hours (that is 3 full days of no fever without the use of medicine that reduces fevers), AND  2. other symptoms (such as cough, shortness of breath) have gotten better, AND  3. at least 10 days have passed since your symptoms first appeared.    Going back to work  Check with your employer for any guidelines to follow for going back to work.    Employers: This document serves as formal notice that your employee tested negative for COVID-19, as of the testing date shown above.    For questions regarding this letter or your Negative COVID-19 result, call [phone] between 8A to 6:30P (M-F) and 10A to 6:30P (weekends).  
Alert and oriented to person, place and time

## 2020-07-10 NOTE — PHYSICAL THERAPY INITIAL EVALUATION ADULT - STANDING BALANCE: STATIC
- Continue Tylenol as needed for pain.  - Use ice or heat to help relieve your pain. Elevate your leg to help with pain.  - Use walker until pain improves.  - Follow up with primary care provider in 2 days if no improvement.  - Return to the ED should symptoms worsen or new develop, such as redness to the leg, fever, chest pain, or difficulty breathing.  
good balance

## 2020-12-14 ENCOUNTER — EMERGENCY (EMERGENCY)
Facility: HOSPITAL | Age: 85
LOS: 1 days | Discharge: NURSING, REHAB OR LONG-TERM CARE | End: 2020-12-14
Attending: EMERGENCY MEDICINE | Admitting: EMERGENCY MEDICINE
Payer: MEDICARE

## 2020-12-14 VITALS
DIASTOLIC BLOOD PRESSURE: 63 MMHG | SYSTOLIC BLOOD PRESSURE: 157 MMHG | TEMPERATURE: 98 F | OXYGEN SATURATION: 100 % | HEIGHT: 70 IN | RESPIRATION RATE: 18 BRPM | HEART RATE: 56 BPM | WEIGHT: 149.91 LBS

## 2020-12-14 VITALS
SYSTOLIC BLOOD PRESSURE: 195 MMHG | HEART RATE: 60 BPM | DIASTOLIC BLOOD PRESSURE: 84 MMHG | TEMPERATURE: 98 F | OXYGEN SATURATION: 100 % | RESPIRATION RATE: 16 BRPM

## 2020-12-14 LAB
ALBUMIN SERPL ELPH-MCNC: 3.5 G/DL — SIGNIFICANT CHANGE UP (ref 3.3–5)
ALP SERPL-CCNC: 121 U/L — HIGH (ref 30–120)
ALT FLD-CCNC: 13 U/L DA — SIGNIFICANT CHANGE UP (ref 10–60)
ANION GAP SERPL CALC-SCNC: 11 MMOL/L — SIGNIFICANT CHANGE UP (ref 5–17)
AST SERPL-CCNC: 21 U/L — SIGNIFICANT CHANGE UP (ref 10–40)
BASOPHILS # BLD AUTO: 0.01 K/UL — SIGNIFICANT CHANGE UP (ref 0–0.2)
BASOPHILS NFR BLD AUTO: 0.2 % — SIGNIFICANT CHANGE UP (ref 0–2)
BILIRUB SERPL-MCNC: 0.4 MG/DL — SIGNIFICANT CHANGE UP (ref 0.2–1.2)
BUN SERPL-MCNC: 47 MG/DL — HIGH (ref 7–23)
CALCIUM SERPL-MCNC: 9.2 MG/DL — SIGNIFICANT CHANGE UP (ref 8.4–10.5)
CHLORIDE SERPL-SCNC: 106 MMOL/L — SIGNIFICANT CHANGE UP (ref 96–108)
CO2 SERPL-SCNC: 22 MMOL/L — SIGNIFICANT CHANGE UP (ref 22–31)
CREAT SERPL-MCNC: 2.43 MG/DL — HIGH (ref 0.5–1.3)
EOSINOPHIL # BLD AUTO: 0.04 K/UL — SIGNIFICANT CHANGE UP (ref 0–0.5)
EOSINOPHIL NFR BLD AUTO: 0.8 % — SIGNIFICANT CHANGE UP (ref 0–6)
GLUCOSE SERPL-MCNC: 104 MG/DL — HIGH (ref 70–99)
HCT VFR BLD CALC: 36.1 % — LOW (ref 39–50)
HGB BLD-MCNC: 11.5 G/DL — LOW (ref 13–17)
IMM GRANULOCYTES NFR BLD AUTO: 0.4 % — SIGNIFICANT CHANGE UP (ref 0–1.5)
LACTATE SERPL-SCNC: 1.2 MMOL/L — SIGNIFICANT CHANGE UP (ref 0.7–2)
LYMPHOCYTES # BLD AUTO: 0.43 K/UL — LOW (ref 1–3.3)
LYMPHOCYTES # BLD AUTO: 8.4 % — LOW (ref 13–44)
MCHC RBC-ENTMCNC: 29.6 PG — SIGNIFICANT CHANGE UP (ref 27–34)
MCHC RBC-ENTMCNC: 31.9 GM/DL — LOW (ref 32–36)
MCV RBC AUTO: 93 FL — SIGNIFICANT CHANGE UP (ref 80–100)
MONOCYTES # BLD AUTO: 0.58 K/UL — SIGNIFICANT CHANGE UP (ref 0–0.9)
MONOCYTES NFR BLD AUTO: 11.4 % — SIGNIFICANT CHANGE UP (ref 2–14)
NEUTROPHILS # BLD AUTO: 4.01 K/UL — SIGNIFICANT CHANGE UP (ref 1.8–7.4)
NEUTROPHILS NFR BLD AUTO: 78.8 % — HIGH (ref 43–77)
NRBC # BLD: 0 /100 WBCS — SIGNIFICANT CHANGE UP (ref 0–0)
PLATELET # BLD AUTO: 135 K/UL — LOW (ref 150–400)
POTASSIUM SERPL-MCNC: 5 MMOL/L — SIGNIFICANT CHANGE UP (ref 3.5–5.3)
POTASSIUM SERPL-SCNC: 5 MMOL/L — SIGNIFICANT CHANGE UP (ref 3.5–5.3)
PROT SERPL-MCNC: 7.3 G/DL — SIGNIFICANT CHANGE UP (ref 6–8.3)
RBC # BLD: 3.88 M/UL — LOW (ref 4.2–5.8)
RBC # FLD: 14.4 % — SIGNIFICANT CHANGE UP (ref 10.3–14.5)
SODIUM SERPL-SCNC: 139 MMOL/L — SIGNIFICANT CHANGE UP (ref 135–145)
WBC # BLD: 5.09 K/UL — SIGNIFICANT CHANGE UP (ref 3.8–10.5)
WBC # FLD AUTO: 5.09 K/UL — SIGNIFICANT CHANGE UP (ref 3.8–10.5)

## 2020-12-14 PROCEDURE — 83605 ASSAY OF LACTIC ACID: CPT

## 2020-12-14 PROCEDURE — 87040 BLOOD CULTURE FOR BACTERIA: CPT

## 2020-12-14 PROCEDURE — 71045 X-RAY EXAM CHEST 1 VIEW: CPT | Mod: 26

## 2020-12-14 PROCEDURE — 85025 COMPLETE CBC W/AUTO DIFF WBC: CPT

## 2020-12-14 PROCEDURE — 96361 HYDRATE IV INFUSION ADD-ON: CPT

## 2020-12-14 PROCEDURE — 36415 COLL VENOUS BLD VENIPUNCTURE: CPT

## 2020-12-14 PROCEDURE — 71045 X-RAY EXAM CHEST 1 VIEW: CPT

## 2020-12-14 PROCEDURE — 96360 HYDRATION IV INFUSION INIT: CPT

## 2020-12-14 PROCEDURE — 93010 ELECTROCARDIOGRAM REPORT: CPT

## 2020-12-14 PROCEDURE — 99285 EMERGENCY DEPT VISIT HI MDM: CPT

## 2020-12-14 PROCEDURE — 93005 ELECTROCARDIOGRAM TRACING: CPT

## 2020-12-14 PROCEDURE — 99284 EMERGENCY DEPT VISIT MOD MDM: CPT | Mod: 25

## 2020-12-14 PROCEDURE — 93970 EXTREMITY STUDY: CPT | Mod: 26

## 2020-12-14 PROCEDURE — 93970 EXTREMITY STUDY: CPT

## 2020-12-14 PROCEDURE — 80053 COMPREHEN METABOLIC PANEL: CPT

## 2020-12-14 RX ORDER — CARBIDOPA AND LEVODOPA 25; 100 MG/1; MG/1
1 TABLET ORAL ONCE
Refills: 0 | Status: COMPLETED | OUTPATIENT
Start: 2020-12-14 | End: 2020-12-14

## 2020-12-14 RX ORDER — METOPROLOL TARTRATE 50 MG
50 TABLET ORAL ONCE
Refills: 0 | Status: COMPLETED | OUTPATIENT
Start: 2020-12-14 | End: 2020-12-14

## 2020-12-14 RX ORDER — LATANOPROST 0.05 MG/ML
1 SOLUTION/ DROPS OPHTHALMIC; TOPICAL
Qty: 0 | Refills: 0 | DISCHARGE

## 2020-12-14 RX ORDER — TIMOLOL 0.5 %
1 DROPS OPHTHALMIC (EYE)
Qty: 0 | Refills: 0 | DISCHARGE

## 2020-12-14 RX ORDER — BRIMONIDINE TARTRATE 2 MG/MG
1 SOLUTION/ DROPS OPHTHALMIC
Qty: 0 | Refills: 0 | DISCHARGE

## 2020-12-14 RX ORDER — SODIUM CHLORIDE 9 MG/ML
1000 INJECTION INTRAMUSCULAR; INTRAVENOUS; SUBCUTANEOUS ONCE
Refills: 0 | Status: COMPLETED | OUTPATIENT
Start: 2020-12-14 | End: 2020-12-14

## 2020-12-14 RX ADMIN — Medication 50 MILLIGRAM(S): at 19:40

## 2020-12-14 RX ADMIN — SODIUM CHLORIDE 1000 MILLILITER(S): 9 INJECTION INTRAMUSCULAR; INTRAVENOUS; SUBCUTANEOUS at 19:41

## 2020-12-14 RX ADMIN — SODIUM CHLORIDE 1000 MILLILITER(S): 9 INJECTION INTRAMUSCULAR; INTRAVENOUS; SUBCUTANEOUS at 13:59

## 2020-12-14 RX ADMIN — CARBIDOPA AND LEVODOPA 1 TABLET(S): 25; 100 TABLET ORAL at 14:35

## 2020-12-14 NOTE — ED PROVIDER NOTE - NSFOLLOWUPINSTRUCTIONS_ED_ALL_ED_FT
follow up with pmd Dr Goddard  call tomorrow to arrange follow up  drink fluids   follow up with wound care center at [facility]   call tomorrow to arrange follow up

## 2020-12-14 NOTE — ED CLERICAL - CLERICAL COMMENTS
called for [facility] ems to send patient back to assisted living.  Johan will pick between 1800 and 1830 called for [facility] ems to send patient back to assisted living.  [facility]. called again at [facility] ems spoke to mayelin and the [facility] is in the parking lot.  coming in just need to clean the amb[facility]anz called for [facility] ems to send patient back to assisted living.  Johan will pick between 1800 and 1830. called again at 1923 [facility] ems spoke to mayelin and the ambulanz is in the parking lot.  coming in just need to clean the ambulanz.  picked up patient [id number] johan

## 2020-12-14 NOTE — ED PROVIDER NOTE - PATIENT PORTAL LINK FT
You can access the FollowMyHealth Patient Portal offered by [facility] by registering at the following website: [email or web address]. By joining [employer]’s FollowMyHealth portal, you will also be able to view your health information using other applications (apps) compatible with our system.

## 2020-12-14 NOTE — ED ADULT NURSE NOTE - NSIMPLEMENTINTERV_GEN_ALL_ED
Implemented All Fall Risk Interventions:  [place] to call system. Call bell, personal items and telephone within reach. Instruct patient to call for assistance. Room bathroom lighting operational. Non-slip footwear when patient is off stretcher. Physically safe environment: no spills, clutter or unnecessary equipment. Stretcher in lowest position, wheels locked, appropriate side rails in place. Provide visual cue, wrist band, yellow gown, etc. Monitor gait and stability. Monitor for mental status changes and reorient to person, place, and time. Review medications for side effects contributing to fall risk. Reinforce activity limits and safety measures with patient and family.

## 2020-12-14 NOTE — ED PROVIDER NOTE - PROVIDER TOKENS
FREE:[LAST:[Dr Palumbo, Wound care Center at [facility]],PHONE:[phone]],FAX:[(   [phone]    -],ADDRESS:[[address] [place] [address] Road],FOLLOWUP:[4-6 Days]]

## 2020-12-14 NOTE — ED PROVIDER NOTE - SKIN [+], MLM
bilateral le wounds , right 4th finger cellulitis, left cheek scabs, with erythema, left eyelid erythema, swelling

## 2020-12-14 NOTE — ED ADULT NURSE NOTE - OBJECTIVE STATEMENT
86 yo male presents to the ED with complaints of facial swelling, redness and swelling noted to left cheek and left eye lid, redness noted to Bilateral LE and Bilateral UE , and  right 4th digit ,wound noted to left shin with dressing dry and intact. Pt also has scabs to his face and reports he picks at it. Pt also reports right  calf pain  .

## 2020-12-14 NOTE — ED PROVIDER NOTE - PROGRESS NOTE DETAILS
Yaritza AS for Dr. Butler:   86 y/o male with a PMHx of HTN, Gout, Parkinson's presents to the ED for facial swelling, b/l LE swelling. Pt is a resident of [facility]. Pt with a hx of cellulitis, was placed on two abx w/o relief so pt was sent in to the ED. Denies cp, abd pain. PE: Pt is awake, alet, know name and place, no complaints, no fevers, chills, +mild erythema on left cheek, skin lesion he picked on, non tender, no discharge, b/l leg erythematous, nontender. left message for patients PMD Dr Goddard, copy of results with discharge papers,  discussed results with patient, given results with discharge papers left message for patients PMD Dr Goddard, copy of results with discharge papers,  discussed results with patient, given results with discharge papers, ua not obtained patient was incontinent of urine spoke with PMD Dr Goddard, case discussed, results discussed, ua not obtained, agreed with discharge plan

## 2020-12-14 NOTE — ED PROVIDER NOTE - CARE PLAN
Principal Discharge DX:	Cellulitis  Secondary Diagnosis:	Wound of lower extremity, unspecified laterality, initial encounter

## 2020-12-14 NOTE — ED PROVIDER NOTE - NS_ ATTENDINGSCRIBEDETAILS _ED_A_ED_FT
Manish Butler MD - The scribe's documentation has been prepared under my direction and personally reviewed by me in its entirety. I confirm that the note above accurately reflects all work, treatment, procedures, and medical decision making performed by me.

## 2020-12-14 NOTE — ED ADULT TRIAGE NOTE - CHIEF COMPLAINT QUOTE
Patient brought in by EMS for left facial swelling, right index finger swelling and increased confusion for 1 week. Patient was started on antibiotics cipro and cephalexin yesterday. Patient alert and oriented in triage.

## 2020-12-14 NOTE — ED PROVIDER NOTE - CLINICAL SUMMARY MEDICAL DECISION MAKING FREE TEXT BOX
patient sent to ED from [facility], presently on abx cipro as per transfer form, hx of le wounds, being treated with medication dressings noted,  right 4th finger cellulitis noted, hx of skin cancer, erthema of left cheek and left eyelid noted, will obtain labs, rectal temp, contact facility patient sent to ED from [facility], presently on abx cipro as per transfer form, hx of le wounds, being treated with medication dressings noted,  right 4th finger cellulitis noted, hx of skin cancer, erthema of left cheek and left eyelid noted, will obtain labs, rectal temp

## 2020-12-14 NOTE — ED PROVIDER NOTE - CARE PROVIDER_API CALL
Dr Palumbo, Wound care Center at [facility],   [address]  Phone: [phone]  Fax: (   )    -  Follow Up Time: 4-6 Days

## 2020-12-14 NOTE — ED PROVIDER NOTE - OBJECTIVE STATEMENT
85 y male sent to ED from [facility], states he is under the tx of Dr Goddard, for his bilateral lower extremity wounds,  states he has been on antibiotics for 3 days, was on one medication cannot recall name, then started on a new antibiotic.  states he was sent for his right 4th finger redness , bilatera le redness, and his facial swelling, patient denies trauma, states he has hx of skin cancer and sometimes he picks at the scabs that are on his face and head.  states he has right calf pain.  former smoker.

## 2020-12-14 NOTE — ED PROVIDER NOTE - SKIN WOUND TYPE
bilateral le cellulitis, dry wound with medication dressing x 1 on right shin, x 2 on left le, right 4th finger erythema, from, nvi , left cheek faint erythema, dry scabs noted, left upper eyelid localized erythema, swelling, no drainage noted

## 2020-12-14 NOTE — ED PROVIDER NOTE - WOUND TYPE
bilateral le cellulitis, right calf pain,  bilateral le wounds with medication dressings in use, left cheek and left upper eyelid erythema

## 2020-12-18 NOTE — ED PROVIDER NOTE - CARE PLAN
Left message with daughter Addie to return call.  
Mobile mailbox was full - left message on what was listed as home # but it sounded like a business number.  
Needs appt  
Spoke with Mikhail Monae  
Principal Discharge DX:	Head trauma, initial encounter

## 2020-12-19 LAB
CULTURE RESULTS: SIGNIFICANT CHANGE UP
CULTURE RESULTS: SIGNIFICANT CHANGE UP
SPECIMEN SOURCE: SIGNIFICANT CHANGE UP
SPECIMEN SOURCE: SIGNIFICANT CHANGE UP

## 2021-03-12 ENCOUNTER — EMERGENCY (EMERGENCY)
Facility: HOSPITAL | Age: 86
LOS: 1 days | Discharge: NURSING, REHAB OR LONG-TERM CARE | End: 2021-03-12
Attending: EMERGENCY MEDICINE | Admitting: EMERGENCY MEDICINE
Payer: MEDICARE

## 2021-03-12 VITALS
HEART RATE: 51 BPM | SYSTOLIC BLOOD PRESSURE: 154 MMHG | TEMPERATURE: 97 F | WEIGHT: 156.97 LBS | RESPIRATION RATE: 19 BRPM | OXYGEN SATURATION: 100 % | HEIGHT: 70 IN | DIASTOLIC BLOOD PRESSURE: 78 MMHG

## 2021-03-12 VITALS
RESPIRATION RATE: 14 BRPM | SYSTOLIC BLOOD PRESSURE: 145 MMHG | HEART RATE: 56 BPM | OXYGEN SATURATION: 100 % | TEMPERATURE: 97 F | DIASTOLIC BLOOD PRESSURE: 75 MMHG

## 2021-03-12 PROCEDURE — 72125 CT NECK SPINE W/O DYE: CPT | Mod: 26,MH

## 2021-03-12 PROCEDURE — 70450 CT HEAD/BRAIN W/O DYE: CPT | Mod: 26,MH

## 2021-03-12 PROCEDURE — 71045 X-RAY EXAM CHEST 1 VIEW: CPT | Mod: 26

## 2021-03-12 PROCEDURE — 73080 X-RAY EXAM OF ELBOW: CPT | Mod: 26,LT

## 2021-03-12 PROCEDURE — 72170 X-RAY EXAM OF PELVIS: CPT | Mod: 26

## 2021-03-12 PROCEDURE — 99284 EMERGENCY DEPT VISIT MOD MDM: CPT

## 2021-03-12 RX ORDER — LANOLIN ALCOHOL/MO/W.PET/CERES
1 CREAM (GRAM) TOPICAL
Qty: 0 | Refills: 0 | DISCHARGE

## 2021-03-12 RX ORDER — QUETIAPINE FUMARATE 200 MG/1
0 TABLET, FILM COATED ORAL
Qty: 0 | Refills: 0 | DISCHARGE

## 2021-03-12 RX ORDER — TIMOLOL 0.5 %
1 DROPS OPHTHALMIC (EYE)
Qty: 0 | Refills: 0 | DISCHARGE

## 2021-03-12 RX ORDER — CICLOPIROX OLAMINE 7.7 MG/G
1 CREAM TOPICAL
Qty: 0 | Refills: 0 | DISCHARGE

## 2021-03-12 RX ORDER — TETANUS AND DIPHTHERIA TOXOIDS ADSORBED 2; 2 [LF]/.5ML; [LF]/.5ML
0.5 INJECTION INTRAMUSCULAR ONCE
Refills: 0 | Status: COMPLETED | OUTPATIENT
Start: 2021-03-12 | End: 2021-03-12

## 2021-03-12 RX ORDER — SENNOSIDES/DOCUSATE SODIUM 8.6MG-50MG
0 TABLET ORAL
Qty: 0 | Refills: 0 | DISCHARGE

## 2021-03-12 RX ADMIN — TETANUS AND DIPHTHERIA TOXOIDS ADSORBED 0.5 MILLILITER(S): 2; 2 INJECTION INTRAMUSCULAR at 11:55

## 2021-03-12 NOTE — ED CLERICAL - CLERICAL COMMENTS
called [facility] ems for transport back to the [facility]. jay jay[facility]anner will  between 1400 and 1430.

## 2021-03-12 NOTE — ED PROVIDER NOTE - OBJECTIVE STATEMENT
87 yo M p/w lost balance while walking, fell to ground. no loc. ?? head inj. no HA/n/v/dizzy. no neck / back pain. Pt co L elbow abrasion, discomfort. No fever/chills. no cp/sob/palp. no numb/ting/focal weak. no agg/allev factors. NO other inj or co. No other recent illness / trauma. Pt with 2 covid vaccines, no known recent exposure.

## 2021-03-12 NOTE — ED PROVIDER NOTE - PATIENT PORTAL LINK FT
You can access the FollowMyHealth Patient Portal offered by [facility] by registering at the following website: [email or web address]. By joining [employer]’s FollowMyHealth portal, you will also be able to view your health information using other applications (apps) compatible with our system.

## 2021-03-12 NOTE — ED PROVIDER NOTE - CARE PLAN
Principal Discharge DX:	Elbow injury, left, initial encounter  Secondary Diagnosis:	Abrasion  Secondary Diagnosis:	Head injuries, initial encounter

## 2021-03-12 NOTE — ED PROVIDER NOTE - ENMT, MLM
Airway patent, Nasal mucosa clear. Mouth with normal mucosa. Throat has no vesicles, no oropharyngeal exudates and uvula is midline. no ext signs of head trauma

## 2021-03-12 NOTE — ED PROVIDER NOTE - NSFOLLOWUPINSTRUCTIONS_ED_ALL_ED_FT
1) Follow-up with your Primary Medical Doctor at [facility] facility for prompt follow-up.  2) Return to Emergency room for any worsening or persistent pain, shortness of breath, chest pains, abdominal pain, numbness, tingling, headaches, vomiting, visual changes, dizziness, weakness, fever, if you are having difficulty getting around or you feel unsteady, or if you have any other concerning symptoms.  3) See attached instruction sheets for additional information, including information regarding signs and symptoms to look out for, reasons to seek immediate care and other important instructions.  4) Make sure to take your time with walking, especially when first standing up.

## 2021-03-13 ENCOUNTER — EMERGENCY (EMERGENCY)
Facility: HOSPITAL | Age: 86
LOS: 1 days | Discharge: HOME | End: 2021-03-13
Attending: EMERGENCY MEDICINE | Admitting: EMERGENCY MEDICINE
Payer: MEDICARE

## 2021-03-13 ENCOUNTER — INPATIENT (INPATIENT)
Facility: HOSPITAL | Age: 86
LOS: 4 days | Discharge: NURSING, REHAB OR LONG-TERM CARE | DRG: 57 | End: 2021-03-18
Attending: STUDENT IN AN ORGANIZED HEALTH CARE EDUCATION/TRAINING PROGRAM | Admitting: STUDENT IN AN ORGANIZED HEALTH CARE EDUCATION/TRAINING PROGRAM
Payer: MEDICARE

## 2021-03-13 VITALS
HEART RATE: 59 BPM | SYSTOLIC BLOOD PRESSURE: 144 MMHG | DIASTOLIC BLOOD PRESSURE: 66 MMHG | TEMPERATURE: 98 F | OXYGEN SATURATION: 100 % | RESPIRATION RATE: 16 BRPM

## 2021-03-13 VITALS
OXYGEN SATURATION: 97 % | RESPIRATION RATE: 18 BRPM | SYSTOLIC BLOOD PRESSURE: 154 MMHG | DIASTOLIC BLOOD PRESSURE: 78 MMHG | TEMPERATURE: 99 F | WEIGHT: 149.03 LBS | HEART RATE: 80 BPM

## 2021-03-13 VITALS
OXYGEN SATURATION: 100 % | WEIGHT: 156.97 LBS | HEART RATE: 59 BPM | SYSTOLIC BLOOD PRESSURE: 163 MMHG | HEIGHT: 70 IN | DIASTOLIC BLOOD PRESSURE: 67 MMHG | TEMPERATURE: 98 F | RESPIRATION RATE: 16 BRPM

## 2021-03-13 LAB
ALBUMIN SERPL ELPH-MCNC: 3.4 G/DL — SIGNIFICANT CHANGE UP (ref 3.3–5)
ALP SERPL-CCNC: 144 U/L — HIGH (ref 30–120)
ALT FLD-CCNC: 25 U/L DA — SIGNIFICANT CHANGE UP (ref 10–60)
ANION GAP SERPL CALC-SCNC: 11 MMOL/L — SIGNIFICANT CHANGE UP (ref 5–17)
APTT BLD: 42.1 SEC — HIGH (ref 27.5–35.5)
AST SERPL-CCNC: 25 U/L — SIGNIFICANT CHANGE UP (ref 10–40)
BILIRUB SERPL-MCNC: 0.4 MG/DL — SIGNIFICANT CHANGE UP (ref 0.2–1.2)
BUN SERPL-MCNC: 42 MG/DL — HIGH (ref 7–23)
CALCIUM SERPL-MCNC: 9.4 MG/DL — SIGNIFICANT CHANGE UP (ref 8.4–10.5)
CHLORIDE SERPL-SCNC: 108 MMOL/L — SIGNIFICANT CHANGE UP (ref 96–108)
CO2 SERPL-SCNC: 23 MMOL/L — SIGNIFICANT CHANGE UP (ref 22–31)
CREAT SERPL-MCNC: 2.54 MG/DL — HIGH (ref 0.5–1.3)
GLUCOSE SERPL-MCNC: 110 MG/DL — HIGH (ref 70–99)
HCT VFR BLD CALC: 33.1 % — LOW (ref 39–50)
HGB BLD-MCNC: 10.5 G/DL — LOW (ref 13–17)
INR BLD: 1.13 RATIO — SIGNIFICANT CHANGE UP (ref 0.88–1.16)
MCHC RBC-ENTMCNC: 29.5 PG — SIGNIFICANT CHANGE UP (ref 27–34)
MCHC RBC-ENTMCNC: 31.7 GM/DL — LOW (ref 32–36)
MCV RBC AUTO: 93 FL — SIGNIFICANT CHANGE UP (ref 80–100)
NRBC # BLD: 0 /100 WBCS — SIGNIFICANT CHANGE UP (ref 0–0)
PLATELET # BLD AUTO: 201 K/UL — SIGNIFICANT CHANGE UP (ref 150–400)
POTASSIUM SERPL-MCNC: 4.6 MMOL/L — SIGNIFICANT CHANGE UP (ref 3.5–5.3)
POTASSIUM SERPL-SCNC: 4.6 MMOL/L — SIGNIFICANT CHANGE UP (ref 3.5–5.3)
PROT SERPL-MCNC: 7.2 G/DL — SIGNIFICANT CHANGE UP (ref 6–8.3)
PROTHROM AB SERPL-ACNC: 13.6 SEC — SIGNIFICANT CHANGE UP (ref 10.6–13.6)
RBC # BLD: 3.56 M/UL — LOW (ref 4.2–5.8)
RBC # FLD: 13.2 % — SIGNIFICANT CHANGE UP (ref 10.3–14.5)
SODIUM SERPL-SCNC: 142 MMOL/L — SIGNIFICANT CHANGE UP (ref 135–145)
TROPONIN I SERPL-MCNC: 0.02 NG/ML — SIGNIFICANT CHANGE UP (ref 0.02–0.06)
WBC # BLD: 7.6 K/UL — SIGNIFICANT CHANGE UP (ref 3.8–10.5)
WBC # FLD AUTO: 7.6 K/UL — SIGNIFICANT CHANGE UP (ref 3.8–10.5)

## 2021-03-13 PROCEDURE — 70450 CT HEAD/BRAIN W/O DYE: CPT | Mod: 26,MH

## 2021-03-13 PROCEDURE — 72125 CT NECK SPINE W/O DYE: CPT | Mod: 26,MH

## 2021-03-13 PROCEDURE — 99285 EMERGENCY DEPT VISIT HI MDM: CPT | Mod: CS

## 2021-03-13 PROCEDURE — 73030 X-RAY EXAM OF SHOULDER: CPT | Mod: 26,RT

## 2021-03-13 PROCEDURE — 99284 EMERGENCY DEPT VISIT MOD MDM: CPT

## 2021-03-13 PROCEDURE — 70450 CT HEAD/BRAIN W/O DYE: CPT | Mod: 26,MH,77

## 2021-03-13 PROCEDURE — G1004: CPT

## 2021-03-13 PROCEDURE — 71045 X-RAY EXAM CHEST 1 VIEW: CPT | Mod: 26

## 2021-03-13 PROCEDURE — 73130 X-RAY EXAM OF HAND: CPT | Mod: 26,LT

## 2021-03-13 RX ORDER — CARBIDOPA AND LEVODOPA 25; 100 MG/1; MG/1
1 TABLET ORAL
Qty: 0 | Refills: 0 | DISCHARGE

## 2021-03-13 RX ORDER — AMLODIPINE BESYLATE 2.5 MG/1
1 TABLET ORAL
Qty: 0 | Refills: 0 | DISCHARGE

## 2021-03-13 RX ORDER — AZTREONAM 2 G
1 VIAL (EA) INJECTION
Qty: 0 | Refills: 0 | DISCHARGE

## 2021-03-13 RX ORDER — METOPROLOL TARTRATE 50 MG
1 TABLET ORAL
Qty: 0 | Refills: 0 | DISCHARGE

## 2021-03-13 RX ORDER — LANOLIN/MINERAL OIL
1 LOTION (ML) TOPICAL
Qty: 0 | Refills: 0 | DISCHARGE

## 2021-03-13 RX ORDER — MULTIVIT-MIN/FERROUS GLUCONATE 9 MG/15 ML
1 LIQUID (ML) ORAL
Qty: 0 | Refills: 0 | DISCHARGE

## 2021-03-13 RX ORDER — POLYETHYLENE GLYCOL 3350 17 G/17G
17 POWDER, FOR SOLUTION ORAL
Qty: 0 | Refills: 0 | DISCHARGE

## 2021-03-13 RX ORDER — ROSUVASTATIN CALCIUM 5 MG/1
1 TABLET ORAL
Qty: 0 | Refills: 0 | DISCHARGE

## 2021-03-13 RX ORDER — SENNOSIDES/DOCUSATE SODIUM 8.6MG-50MG
2 TABLET ORAL
Qty: 0 | Refills: 0 | DISCHARGE

## 2021-03-13 RX ORDER — MAGNESIUM HYDROXIDE 400 MG/1
30 TABLET, CHEWABLE ORAL
Qty: 0 | Refills: 0 | DISCHARGE

## 2021-03-13 RX ORDER — LANOLIN ALCOHOL/MO/W.PET/CERES
2 CREAM (GRAM) TOPICAL
Qty: 0 | Refills: 0 | DISCHARGE

## 2021-03-13 RX ORDER — ACETAMINOPHEN 500 MG
2 TABLET ORAL
Qty: 0 | Refills: 0 | DISCHARGE

## 2021-03-13 RX ORDER — BRIMONIDINE TARTRATE 2 MG/MG
1 SOLUTION/ DROPS OPHTHALMIC
Qty: 0 | Refills: 0 | DISCHARGE

## 2021-03-13 RX ORDER — TIMOLOL 0.5 %
1 DROPS OPHTHALMIC (EYE)
Qty: 0 | Refills: 0 | DISCHARGE

## 2021-03-13 RX ORDER — ASPIRIN/CALCIUM CARB/MAGNESIUM 324 MG
1 TABLET ORAL
Qty: 0 | Refills: 0 | DISCHARGE

## 2021-03-13 NOTE — ED PROVIDER NOTE - CARE PROVIDER_API CALL
Eleazar Goddard  [facility]  [address], [address]  [place] [address]  Phone: [phone]  Fax: [phone]  Follow Up Time: 1-3 Days

## 2021-03-13 NOTE — ED PROVIDER NOTE - CLINICAL SUMMARY MEDICAL DECISION MAKING FREE TEXT BOX
h/o parkinsonism with multiple falls and head trauma    plan   labs ekg CT scan , admit to medicine social service consult

## 2021-03-13 NOTE — ED PROVIDER NOTE - OBJECTIVE STATEMENT
86 year old male with history of mild cognitive impairment, parkinson's UC, HTN, gout, CKD, acoustic neuroma, and HLD presents for evaluation after fall. has history of unsteady gait and frequent falls due to parkinson's. was in bathroom, went to turn, lost balance, and hit head on shower door. no LOC. does not take blood thinners. no HA, dizziness, confusion, memory loss, neck pain, or back pain. does not take any blood thinners. mild right shoulder pain. small abrasion to right side of head. tetanus up to date. denies other injuries or complaints.   was seen here yesterday for fall. has skin tears to bilateral arms.   resident at [facility] assisted living  PCP Jairo Goddard

## 2021-03-13 NOTE — ED CLERICAL - CLERICAL COMMENTS
called [facility] ems for transport back to [facility].  [facility] will  patient between 1625 and 1650

## 2021-03-13 NOTE — ED ADULT NURSE NOTE - NSIMPLEMENTINTERV_GEN_ALL_ED
Yes
Implemented All Fall with Harm Risk Interventions:  [place] to call system. Call bell, personal items and telephone within reach. Instruct patient to call for assistance. Room bathroom lighting operational. Non-slip footwear when patient is off stretcher. Physically safe environment: no spills, clutter or unnecessary equipment. Stretcher in lowest position, wheels locked, appropriate side rails in place. Provide visual cue, wrist band, yellow gown, etc. Monitor gait and stability. Monitor for mental status changes and reorient to person, place, and time. Review medications for side effects contributing to fall risk. Reinforce activity limits and safety measures with patient and family. Provide visual clues: red socks.

## 2021-03-13 NOTE — ED ADULT TRIAGE NOTE - CHIEF COMPLAINT QUOTE
As per EMS crew " He was found on the bathroom floor by the staff of [facility] " Pt sent in for fall incident No LOC reported (+) Left eye laceration Hx of Mild Cognitive impairment , Gait and balance disturbance As per EMS crew " He was found on the bathroom floor by the staff of [facility] " Pt sent in for fall incident No LOC reported (+) Left eyebrow laceration Hx of Mild Cognitive impairment , Gait and balance disturbance As per EMS crew " He was found on the bathroom floor by the staff of [facility] " Pt sent in for fall incident No LOC reported (+) Right lateral eyebrow small laceration Hx of Mild Cognitive impairment , Gait and balance disturbance

## 2021-03-13 NOTE — ED ADULT NURSE NOTE - CHIEF COMPLAINT QUOTE
As per EMS crew " He was found on the bathroom floor by the staff of The [facility] " Pt sent in for fall incident No LOC reported (+) Left eye laceration Hx of Mild Cognitive impairment , Gait and balance disturbance

## 2021-03-13 NOTE — ED ADULT NURSE NOTE - OBJECTIVE STATEMENT
pt comes to ed from [facility], pt states he tripped and fell today in the bathroom, has small abrasion to right temporal area and skin tear to right arm, pt states he was here yesterday for a fall as well, has abrasion to L arm from yesterday and bandage to right lower leg. pt states his shoes are no good and that's why he falls, his shoes are falling apart, pt denies any dizziness prior to the fall, denies any pain, no obvious sings of trauma or deformity. pt MAEx4, neuro intact and baseline. pt denies LOC, Hx of Mild Cognitive impairment secondary to parkinson's w/ Gait and balance disturbance. pt redness noted to BL LE. pt denies fevers chills, cp or sob.

## 2021-03-13 NOTE — ED PROVIDER NOTE - ENMT, MLM
Airway patent, Throat has no vesicles, no oropharyngeal exudates and uvula is midline. no hematoma. no gant sign or raccoon eyes

## 2021-03-13 NOTE — ED ADULT NURSE NOTE - CHIEF COMPLAINT QUOTE
Patient presents from [facility] s/p fall with head injury, no LOC. Patient stated to EMS that he felt weakness and that's why he fell. Found on floor by staff at facility. Patient was recently admitted for similar complaints. hx of parkinson's.

## 2021-03-13 NOTE — ED ADULT TRIAGE NOTE - CHIEF COMPLAINT QUOTE
Patient presents from [facility] s/p fall with head injury, no LOC. Patient stated to EMS that he felt weakness and that's why he fell. Found on floor by staff at facility. Patient was recently admitted for similar complaints. hx of parkinson's.

## 2021-03-13 NOTE — ED PROVIDER NOTE - OBJECTIVE STATEMENT
87 y/o male with advanced parkinsonism who is having frequent falls. He resides at the [facility]. He has had two falls in the past 24 hours. He states that when he tries to stand up, he notes that he is becoming more unsteady and stumbles. This evening he fell and contused his head and has B/L elbow abrasions. no skeletal pain, no HA, no Neck pain, no CP, no SOB, no fever, no COVID no stroke symptoms. Paramedics have indicated that the [facility] is actively planning on putting the patient in a unit with higher level of observation. 85 y/o male with advanced parkinsonism who is having frequent falls. He resides at the [facility]. He has had three falls in the past 24 hours. He states that when he tries to stand up, he notes that he is becoming more unsteady and stumbles. This evening he fell and contused his head and has B/L elbow abrasions. no skeletal pain, no HA, no Neck pain, no CP, no SOB, no fever, no COVID no stroke symptoms. Paramedics have indicated that the [facility] is actively planning on putting the patient in a unit with higher level of observation.

## 2021-03-13 NOTE — ED ADULT NURSE NOTE - OBJECTIVE STATEMENT
85yo male BIBA, as per ems "he's had multiple falls today". pt was recently d/c from [facility] for a fall. pt was skin tears from previous fall. pt denies LOC, neck/back pain. "the staff just found in on the floor" as per ems.

## 2021-03-13 NOTE — ED PROVIDER NOTE - MUSCULOSKELETAL, MLM
Spine appears normal, range of motion is not limited, no muscle or joint tenderness b/l abrasions to the elbows

## 2021-03-13 NOTE — ED PROVIDER NOTE - PROGRESS NOTE DETAILS
Scrjuancarlose AS for Dr. Chan: 87 y/o male with a PMHx of UC, Parkinson's, HTN, CKD BIBA from [facility] for fall x today. Pt states that he fell in his bathroom today. Pt states that he went to turn his head and hit it on the shower door, causing him to fall. No LOC. Pt uses a walker at baseline but let go of his walker when he turned. States that he has balance issues at baseline. Pt currently not c/o pain. Denies hip pain, back pain, abd pain, chest pain. Does present with an abrasion on his right temple. Pt was seen in the ED for a fall yesterday as well. Presents with skin tears to his bilateral arms, which are old. PE: laying in bed, in NAD. Alert to person, aware he is in hospital but confused, has dementia at baseline. +abrasion to right frontal temporal region, no active bleeding, no orbital TTP, heart was rrr, no chest wall TTP, lungs clear, abd s/nt/nondistended, no hip TTP bilateral, no TTP to bilateral lower extremities, chronic venous stasis changes b/l to lower extremities with healing ulceration to right posterior calf region, currently dressed and wrapped, healing skin tears to bilateral UEs, ecchymosis to dorsum of left hand overlying 3rd and 4th mcps with some TTP, NVI x 4 extremities, no midline C/T/L spinal TTP. MDM: 86 year old male hx of Parkinson's and Imbalance suffered a reported mechanical fall today, no LOC, NVI with some vague MSK pain, did have head injury so will obtain CT head, get XR right shoulder and left hand and monitor. Reevaluated patient at bedside.  resting comfortably. no complaints. Discussed the results of all diagnostic testing in ED and copies of all reports given.  patient and family informed of results.  An opportunity to ask questions was given.  Discussed the importance of prompt, close medical follow-up.  Patient will return with any changes, concerns or persistent / worsening symptoms.  Understanding of all instructions verbalized.

## 2021-03-13 NOTE — ED PROVIDER NOTE - SKIN, MLM
small abrasion just lateral to right eye. skin tears to bilateral arms from fall yesterday small abrasion just lateral to right eye. skin tears to bilateral arms from fall yesterday. mild chronic redness to bilateral lower ext due to venous stases. healing superficial ulceration to right posterior lower leg

## 2021-03-13 NOTE — ED PROVIDER NOTE - CLINICAL SUMMARY MEDICAL DECISION MAKING FREE TEXT BOX
small facial abrasion and mild right shoulder pain after mechanical fall. reports balance issues with parkinson's and states turned too fast. due to age, will CT head and cervical spine to eval for ICH or vert fx. abrasion superficial in nature, no indication for repair. x-ray shoulder to eval for fx. do not suspect dislocation, no deformity. no chest wall tenderness or abd tenderness to suggest rib fx or intra-abdominal injury

## 2021-03-13 NOTE — ED PROVIDER NOTE - MUSCULOSKELETAL, MLM
Spine appears normal, range of motion is not limited,  no vert tenderness or step off deformities. mild right shoulder tenderness. no ant fullness or deformity

## 2021-03-13 NOTE — ED PROVIDER NOTE - CARE PLAN
Principal Discharge DX:	Frequent falls  Secondary Diagnosis:	Head trauma  Secondary Diagnosis:	Abrasions of multiple sites

## 2021-03-13 NOTE — ED PROVIDER NOTE - ATTENDING CONTRIBUTION TO CARE
Magalie AS for Dr. Chan: 85 y/o male with a PMHx of UC, Parkinson's, HTN, CKD BIBA from [facility] for fall x today. Pt states that he fell in his bathroom today. Pt that he ambulates with a walker at baseline. He reports that he was in the bathroom, went to turn and was not using his walker, when he turned suddenly he lost his balance and fell striking his head on the shower door. Denies LOC. Pt is not on blood thinners but does take ASA. States that he has balance issues at baseline. Pt currently not c/o pain. Denies hip pain, back pain, neck pain, abd pain, chest pain. Denies SOB, fever, chills. Does present with an abrasion on his right temple. Pt was seen in the ED for a fall yesterday as well. This was also a mechanical fall. He sustained mulitple skin tears to his UE at that time. PE: laying in bed, in NAD. Alert to person, aware he is in hospital but confused to year, has dementia at baseline. +abrasion to right frontal temporal region, no active bleeding, no orbital TTP bilaterally, heart was rrr, no chest wall TTP no contusions noted to chest wall, lungs clear, abd s/nt/nondistended, no hip TTP bilateral, no TTP to bilateral lower extremities, chronic venous stasis changes b/l to lower extremities with healing ulceration to right posterior calf region, currently dressed and wrapped. No evidence of superimposed infection. Healing skin tears to bilateral UEs, ecchymosis to dorsum of left hand overlying 3rd and 4th mcps with some TTP, NVI x 4 extremities, no midline C/T/L spinal TTP. MDM: 86 year old male hx of Parkinson's and balance issues suffered a reported mechanical fall today, no LOC, NVI with some vague MSK pain, did have head injury so will obtain CT head, get XR right shoulder and left hand and monitor. Pt advised on importance of using walker at all times to help in his balance Magalie AS for Dr. Chan: 87 y/o male with a PMHx of UC, Parkinson's, HTN, CKD BIBA from [facility] for fall x today. Pt states that he fell in his bathroom today. Pt that he ambulates with a walker at baseline. He reports that he was in the bathroom, went to turn and was not using his walker, when he turned suddenly he lost his balance and fell striking his head on the shower door. Denies LOC. Pt is not on blood thinners but does take ASA. States that he has balance issues at baseline. Pt currently not c/o pain. Denies hip pain, back pain, neck pain, abd pain, chest pain. Denies SOB, fever, chills. Does present with an abrasion on his right temple. Pt was seen in the ED for a fall yesterday as well. This was also a mechanical fall. He sustained multiple skin tears to his UE at that time. PE: laying in bed, in NAD. Alert to person, aware he is in hospital but confused to year, has dementia at baseline. +abrasion to right frontal temporal region, no active bleeding, no orbital TTP bilaterally, heart was rrr, no chest wall TTP no contusions noted to chest wall, lungs clear, abd s/nt/nondistended, no hip TTP bilateral, no TTP to bilateral lower extremities, chronic venous stasis changes b/l to lower extremities with healing ulceration to right posterior calf region, currently dressed and wrapped. No evidence of superimposed infection. Healing skin tears to bilateral UEs no joshua TTP, ecchymosis to dorsum of left hand overlying 3rd and 4th mcps with some TTP, NVI x 4 extremities, no midline C/T/L spinal TTP. MDM: 86 year old male hx of Parkinson's and balance issues suffered a reported mechanical fall today, no LOC, NVI with some vague MSK pain, did have head injury so will obtain CT head, get XR right shoulder and left hand and monitor. Pt advised on importance of using walker at all times to help in his balance

## 2021-03-13 NOTE — ED PROVIDER NOTE - SECONDARY DIAGNOSIS.
Facial abrasion, initial encounter Fall, initial encounter Contusion of right shoulder, initial encounter

## 2021-03-13 NOTE — ED ADULT NURSE REASSESSMENT NOTE - NS ED NURSE REASSESS COMMENT FT1
wounds cleaned and dressed, pt provided with comfort care, requesting beard to be shaved. NA assisted with hygiene care. Son contacted and spoke to EMILY richards regarding discharge and future plan of care.

## 2021-03-13 NOTE — ED PROVIDER NOTE - NSFOLLOWUPINSTRUCTIONS_ED_ALL_ED_FT
apply thin layer of bacitracin to abrasion 1-2 times a day  tylenol as needed for pain  ice  follow up with primary care provider       Head Injury    WHAT YOU NEED TO KNOW:    A head injury can include your scalp, face, skull, or brain and range from mild to severe. Effects can appear immediately after the injury or develop later. The effects may last a short time or be permanent. Healthcare providers may want to check your recovery over time. Treatment may change as you recover or develop new health problems from the head injury.    DISCHARGE INSTRUCTIONS:    Call your local emergency number (911 in the ), or have someone else call if:   •You cannot be woken.      •You have a seizure.      •You stop responding to others or you faint.      •You have blurry or double vision.      •Your speech becomes slurred or confused.      •You have arm or leg weakness, loss of feeling, or new problems with coordination.      •Your pupils are larger than usual, or one pupil is a different size than the other.      •You have blood or clear fluid coming out of your ears or nose.      Return to the emergency department if:   •You have repeated or forceful vomiting.      •You feel confused.      •Your headache gets worse or becomes severe.      •You or someone caring for you notices that you are harder to wake than usual.      Call your doctor if:   •Your symptoms last longer than 6 weeks after the injury.      •You have questions or concerns about your condition or care.      Medicines:   •Acetaminophen decreases pain and fever. It is available without a doctor's order. Ask how much to take and how often to take it. Follow directions. Read the labels of all other medicines you are using to see if they also contain acetaminophen, or ask your doctor or pharmacist. Acetaminophen can cause liver damage if not taken correctly. Do not use more than 4 grams (4,000 milligrams) total of acetaminophen in one day.       •Take your medicine as directed. Contact your healthcare provider if you think your medicine is not helping or if you have side effects. Tell him or her if you are allergic to any medicine. Keep a list of the medicines, vitamins, and herbs you take. Include the amounts, and when and why you take them. Bring the list or the pill bottles to follow-up visits. Carry your medicine list with you in case of an emergency.      Self-care:   •Rest or do quiet activities. Limit your time watching TV, using the computer, or doing tasks that require a lot of thinking. Slowly return to your normal activities as directed. Do not play sports or do activities that may cause you to get hit in the head. Ask your healthcare provider when you can return to sports.      •Apply ice on your head for 15 to 20 minutes every hour or as directed. Use an ice pack, or put crushed ice in a plastic bag. Cover it with a towel before you apply it to your skin. Ice helps prevent tissue damage and decreases swelling and pain.      •Have someone stay with you for 24 hours , or as directed. This person can monitor you for problems and call for help if needed. When you are awake, the person should ask you a few questions every few hours to see if you are thinking clearly. An example is to ask your name or address.      Prevent another head injury:   •Wear a helmet that fits properly. Do this when you play sports, or ride a bike, scooter, or skateboard. Helmets help decrease your risk for a serious head injury. Talk to your healthcare provider about other ways you can protect yourself if you play sports.      •Wear your seatbelt every time you are in a car. This helps lower your risk for a head injury if you are in a car accident.      Follow up with your doctor as directed: Write down your questions so you remember to ask them during your visits.

## 2021-03-13 NOTE — ED PROVIDER NOTE - PATIENT PORTAL LINK FT
You can access the FollowMyHealth Patient Portal offered by [facility] [place] by registering at the following website: [email or web address]. By joining [employer]’s FollowMyHealth portal, you will also be able to view your health information using other applications (apps) compatible with our system.

## 2021-03-13 NOTE — ED PROVIDER NOTE - CARE PLAN
Principal Discharge DX:	Closed head injury, initial encounter  Secondary Diagnosis:	Facial abrasion, initial encounter  Secondary Diagnosis:	Fall, initial encounter  Secondary Diagnosis:	Contusion of right shoulder, initial encounter

## 2021-03-14 DIAGNOSIS — N18.9 CHRONIC KIDNEY DISEASE, UNSPECIFIED: ICD-10-CM

## 2021-03-14 DIAGNOSIS — E78.5 HYPERLIPIDEMIA, UNSPECIFIED: ICD-10-CM

## 2021-03-14 DIAGNOSIS — S09.90XA UNSPECIFIED INJURY OF HEAD, INITIAL ENCOUNTER: ICD-10-CM

## 2021-03-14 DIAGNOSIS — I10 ESSENTIAL (PRIMARY) HYPERTENSION: ICD-10-CM

## 2021-03-14 DIAGNOSIS — R29.6 REPEATED FALLS: ICD-10-CM

## 2021-03-14 DIAGNOSIS — G20 PARKINSON'S DISEASE: ICD-10-CM

## 2021-03-14 DIAGNOSIS — D64.9 ANEMIA, UNSPECIFIED: ICD-10-CM

## 2021-03-14 DIAGNOSIS — T07.XXXA UNSPECIFIED MULTIPLE INJURIES, INITIAL ENCOUNTER: ICD-10-CM

## 2021-03-14 DIAGNOSIS — H40.9 UNSPECIFIED GLAUCOMA: ICD-10-CM

## 2021-03-14 DIAGNOSIS — Z29.9 ENCOUNTER FOR PROPHYLACTIC MEASURES, UNSPECIFIED: ICD-10-CM

## 2021-03-14 LAB
ANION GAP SERPL CALC-SCNC: 14 MMOL/L — SIGNIFICANT CHANGE UP (ref 5–17)
APPEARANCE UR: CLEAR — SIGNIFICANT CHANGE UP
APTT BLD: 42 SEC — HIGH (ref 27.5–35.5)
BILIRUB UR-MCNC: NEGATIVE — SIGNIFICANT CHANGE UP
BUN SERPL-MCNC: 39 MG/DL — HIGH (ref 7–23)
CALCIUM SERPL-MCNC: 9 MG/DL — SIGNIFICANT CHANGE UP (ref 8.4–10.5)
CHLORIDE SERPL-SCNC: 108 MMOL/L — SIGNIFICANT CHANGE UP (ref 96–108)
CO2 SERPL-SCNC: 20 MMOL/L — LOW (ref 22–31)
COLOR SPEC: YELLOW — SIGNIFICANT CHANGE UP
CREAT SERPL-MCNC: 2.51 MG/DL — HIGH (ref 0.5–1.3)
DIFF PNL FLD: ABNORMAL
GLUCOSE SERPL-MCNC: 120 MG/DL — HIGH (ref 70–99)
GLUCOSE UR QL: NEGATIVE MG/DL — SIGNIFICANT CHANGE UP
HCT VFR BLD CALC: 33.5 % — LOW (ref 39–50)
HGB BLD-MCNC: 10.8 G/DL — LOW (ref 13–17)
INR BLD: 1.1 RATIO — SIGNIFICANT CHANGE UP (ref 0.88–1.16)
KETONES UR-MCNC: ABNORMAL
LEUKOCYTE ESTERASE UR-ACNC: NEGATIVE — SIGNIFICANT CHANGE UP
MCHC RBC-ENTMCNC: 30.1 PG — SIGNIFICANT CHANGE UP (ref 27–34)
MCHC RBC-ENTMCNC: 32.2 GM/DL — SIGNIFICANT CHANGE UP (ref 32–36)
MCV RBC AUTO: 93.3 FL — SIGNIFICANT CHANGE UP (ref 80–100)
NITRITE UR-MCNC: NEGATIVE — SIGNIFICANT CHANGE UP
NRBC # BLD: 0 /100 WBCS — SIGNIFICANT CHANGE UP (ref 0–0)
PH UR: 5 — SIGNIFICANT CHANGE UP (ref 5–8)
PLATELET # BLD AUTO: 202 K/UL — SIGNIFICANT CHANGE UP (ref 150–400)
POTASSIUM SERPL-MCNC: 4.5 MMOL/L — SIGNIFICANT CHANGE UP (ref 3.5–5.3)
POTASSIUM SERPL-SCNC: 4.5 MMOL/L — SIGNIFICANT CHANGE UP (ref 3.5–5.3)
PROT UR-MCNC: 100 MG/DL
PROTHROM AB SERPL-ACNC: 13.3 SEC — SIGNIFICANT CHANGE UP (ref 10.6–13.6)
RBC # BLD: 3.59 M/UL — LOW (ref 4.2–5.8)
RBC # FLD: 13.2 % — SIGNIFICANT CHANGE UP (ref 10.3–14.5)
SARS-COV-2 IGG SERPL QL IA: NEGATIVE — SIGNIFICANT CHANGE UP
SARS-COV-2 IGM SERPL IA-ACNC: 0.07 INDEX — SIGNIFICANT CHANGE UP
SARS-COV-2 RNA SPEC QL NAA+PROBE: SIGNIFICANT CHANGE UP
SODIUM SERPL-SCNC: 142 MMOL/L — SIGNIFICANT CHANGE UP (ref 135–145)
SP GR SPEC: 1.02 — SIGNIFICANT CHANGE UP (ref 1.01–1.02)
UROBILINOGEN FLD QL: NEGATIVE MG/DL — SIGNIFICANT CHANGE UP
WBC # BLD: 8.16 K/UL — SIGNIFICANT CHANGE UP (ref 3.8–10.5)
WBC # FLD AUTO: 8.16 K/UL — SIGNIFICANT CHANGE UP (ref 3.8–10.5)

## 2021-03-14 PROCEDURE — 99223 1ST HOSP IP/OBS HIGH 75: CPT

## 2021-03-14 PROCEDURE — 93010 ELECTROCARDIOGRAM REPORT: CPT

## 2021-03-14 RX ORDER — ATORVASTATIN CALCIUM 80 MG/1
40 TABLET, FILM COATED ORAL AT BEDTIME
Refills: 0 | Status: DISCONTINUED | OUTPATIENT
Start: 2021-03-14 | End: 2021-03-18

## 2021-03-14 RX ORDER — AMLODIPINE BESYLATE 2.5 MG/1
10 TABLET ORAL DAILY
Refills: 0 | Status: DISCONTINUED | OUTPATIENT
Start: 2021-03-14 | End: 2021-03-18

## 2021-03-14 RX ORDER — MAGNESIUM HYDROXIDE 400 MG/1
30 TABLET, CHEWABLE ORAL DAILY
Refills: 0 | Status: DISCONTINUED | OUTPATIENT
Start: 2021-03-14 | End: 2021-03-18

## 2021-03-14 RX ORDER — SENNA PLUS 8.6 MG/1
2 TABLET ORAL AT BEDTIME
Refills: 0 | Status: DISCONTINUED | OUTPATIENT
Start: 2021-03-14 | End: 2021-03-18

## 2021-03-14 RX ORDER — AMLODIPINE BESYLATE 2.5 MG/1
10 TABLET ORAL DAILY
Refills: 0 | Status: DISCONTINUED | OUTPATIENT
Start: 2021-03-14 | End: 2021-03-14

## 2021-03-14 RX ORDER — TIMOLOL 0.5 %
1 DROPS OPHTHALMIC (EYE)
Refills: 0 | Status: DISCONTINUED | OUTPATIENT
Start: 2021-03-14 | End: 2021-03-18

## 2021-03-14 RX ORDER — BRIMONIDINE TARTRATE 2 MG/MG
1 SOLUTION/ DROPS OPHTHALMIC
Refills: 0 | Status: DISCONTINUED | OUTPATIENT
Start: 2021-03-14 | End: 2021-03-18

## 2021-03-14 RX ORDER — ACETAMINOPHEN 500 MG
650 TABLET ORAL EVERY 8 HOURS
Refills: 0 | Status: DISCONTINUED | OUTPATIENT
Start: 2021-03-14 | End: 2021-03-18

## 2021-03-14 RX ORDER — CARBIDOPA AND LEVODOPA 25; 100 MG/1; MG/1
1 TABLET ORAL THREE TIMES A DAY
Refills: 0 | Status: DISCONTINUED | OUTPATIENT
Start: 2021-03-14 | End: 2021-03-18

## 2021-03-14 RX ORDER — BACITRACIN ZINC 500 UNIT/G
1 OINTMENT IN PACKET (EA) TOPICAL
Refills: 0 | Status: DISCONTINUED | OUTPATIENT
Start: 2021-03-14 | End: 2021-03-18

## 2021-03-14 RX ORDER — POLYETHYLENE GLYCOL 3350 17 G/17G
17 POWDER, FOR SOLUTION ORAL DAILY
Refills: 0 | Status: DISCONTINUED | OUTPATIENT
Start: 2021-03-14 | End: 2021-03-18

## 2021-03-14 RX ORDER — LANOLIN ALCOHOL/MO/W.PET/CERES
5 CREAM (GRAM) TOPICAL AT BEDTIME
Refills: 0 | Status: DISCONTINUED | OUTPATIENT
Start: 2021-03-14 | End: 2021-03-18

## 2021-03-14 RX ORDER — MULTIVIT-MIN/FERROUS GLUCONATE 9 MG/15 ML
1 LIQUID (ML) ORAL DAILY
Refills: 0 | Status: DISCONTINUED | OUTPATIENT
Start: 2021-03-14 | End: 2021-03-18

## 2021-03-14 RX ORDER — ASPIRIN/CALCIUM CARB/MAGNESIUM 324 MG
81 TABLET ORAL DAILY
Refills: 0 | Status: DISCONTINUED | OUTPATIENT
Start: 2021-03-14 | End: 2021-03-18

## 2021-03-14 RX ORDER — LANOLIN/MINERAL OIL
1 LOTION (ML) TOPICAL
Refills: 0 | Status: DISCONTINUED | OUTPATIENT
Start: 2021-03-14 | End: 2021-03-18

## 2021-03-14 RX ORDER — HEPARIN SODIUM 5000 [USP'U]/ML
5000 INJECTION INTRAVENOUS; SUBCUTANEOUS EVERY 8 HOURS
Refills: 0 | Status: DISCONTINUED | OUTPATIENT
Start: 2021-03-14 | End: 2021-03-18

## 2021-03-14 RX ORDER — METOPROLOL TARTRATE 50 MG
50 TABLET ORAL
Refills: 0 | Status: DISCONTINUED | OUTPATIENT
Start: 2021-03-14 | End: 2021-03-18

## 2021-03-14 RX ORDER — METOPROLOL TARTRATE 50 MG
50 TABLET ORAL
Refills: 0 | Status: DISCONTINUED | OUTPATIENT
Start: 2021-03-14 | End: 2021-03-14

## 2021-03-14 RX ADMIN — AMLODIPINE BESYLATE 10 MILLIGRAM(S): 2.5 TABLET ORAL at 06:43

## 2021-03-14 RX ADMIN — BRIMONIDINE TARTRATE 1 DROP(S): 2 SOLUTION/ DROPS OPHTHALMIC at 17:43

## 2021-03-14 RX ADMIN — Medication 5 MILLIGRAM(S): at 21:58

## 2021-03-14 RX ADMIN — Medication 1 APPLICATION(S): at 07:08

## 2021-03-14 RX ADMIN — HEPARIN SODIUM 5000 UNIT(S): 5000 INJECTION INTRAVENOUS; SUBCUTANEOUS at 21:57

## 2021-03-14 RX ADMIN — CARBIDOPA AND LEVODOPA 1 TABLET(S): 25; 100 TABLET ORAL at 13:40

## 2021-03-14 RX ADMIN — Medication 1 DROP(S): at 06:45

## 2021-03-14 RX ADMIN — Medication 1 DROP(S): at 17:44

## 2021-03-14 RX ADMIN — Medication 1 APPLICATION(S): at 17:48

## 2021-03-14 RX ADMIN — Medication 1 APPLICATION(S): at 17:43

## 2021-03-14 RX ADMIN — Medication 1 TABLET(S): at 11:57

## 2021-03-14 RX ADMIN — HEPARIN SODIUM 5000 UNIT(S): 5000 INJECTION INTRAVENOUS; SUBCUTANEOUS at 13:40

## 2021-03-14 RX ADMIN — ATORVASTATIN CALCIUM 40 MILLIGRAM(S): 80 TABLET, FILM COATED ORAL at 21:57

## 2021-03-14 RX ADMIN — HEPARIN SODIUM 5000 UNIT(S): 5000 INJECTION INTRAVENOUS; SUBCUTANEOUS at 07:10

## 2021-03-14 RX ADMIN — CARBIDOPA AND LEVODOPA 1 TABLET(S): 25; 100 TABLET ORAL at 06:43

## 2021-03-14 RX ADMIN — Medication 50 MILLIGRAM(S): at 06:43

## 2021-03-14 RX ADMIN — Medication 1 APPLICATION(S): at 07:11

## 2021-03-14 RX ADMIN — SENNA PLUS 2 TABLET(S): 8.6 TABLET ORAL at 21:58

## 2021-03-14 RX ADMIN — BRIMONIDINE TARTRATE 1 DROP(S): 2 SOLUTION/ DROPS OPHTHALMIC at 06:44

## 2021-03-14 RX ADMIN — CARBIDOPA AND LEVODOPA 1 TABLET(S): 25; 100 TABLET ORAL at 21:57

## 2021-03-14 RX ADMIN — Medication 81 MILLIGRAM(S): at 11:57

## 2021-03-14 RX ADMIN — Medication 50 MILLIGRAM(S): at 17:44

## 2021-03-14 NOTE — DIETITIAN NUTRITION RISK NOTIFICATION - TREATMENT: THE FOLLOWING DIET HAS BEEN RECOMMENDED
Diet, Regular:   DASH/TLC {Sodium & Cholesterol Restricted}  No Concentrated Potassium  No Concentrated Phosphorus (03-14-21 @ 05:57) [Active]

## 2021-03-14 NOTE — H&P ADULT - NSICDXPASTMEDICALHX_GEN_ALL_CORE_FT
PAST MEDICAL HISTORY:  Acoustic neuroma     Chronic kidney disease     Gout     HTN (hypertension)     HTN (hypertension)     Parkinsons     UC (ulcerative colitis)

## 2021-03-14 NOTE — H&P ADULT - NSHPREVIEWOFSYSTEMS_GEN_ALL_CORE
Limited ROS secondary to mental status   Denies headache, chest pain, abdominal pain, nausea/vomiting, joint pain. -    Unable to obtain a reliable ROS secondary to mental status   Denies headache, chest pain, abdominal pain, nausea/vomiting, joint pain.

## 2021-03-14 NOTE — H&P ADULT - HISTORY OF PRESENT ILLNESS
Patient is an 87 yo male BIBA from [place] with a PMH of cognitive impairment, Parkinson's, UC, HTN, HLD, CKD, glaucoma, acoustic neuroma that presents after 3 falls within 24 hours. Of note patient is a poor historian with fluctuating levels of awareness. Patient was seen in the ED on 3/12 as well as twice on 3/13.  Last night patient states that he went to get up from his chair and took 2 steps before loosing his balance and falling to the ground hitting his head and elbows.  Patient walks with a walker at baseline as states he has done so for a "couple years."  Multiple inconsistencies in his story as he originally stated that his R should hurt but later on stated he was in no pain and was unsure if he hit his head.  Patient is an 85 yo male BIBA from [facility] with a PMH of HTN, Dyslipidemia, CKD stage 4, Parkinson's Disease, Cognitive Impairment, UC, glaucoma, and acoustic neuroma that presents after 3 falls within 24 hours. Of note patient is a poor historian with fluctuating levels of awareness. Patient was seen in the ED on 3/12 as well as twice on 3/13.  Last night patient states that he went to get up from his chair and took 2 steps before loosing his balance and falling to the ground hitting his head and elbows.  Patient walks with a walker at baseline as states he has done so for a "couple years."  Multiple inconsistencies in his story as he originally stated that his R should hurt but later on stated he was in no pain and was unsure if he hit his head.

## 2021-03-14 NOTE — H&P ADULT - PROBLEM SELECTOR PLAN 6
- avoid nephrotoxic medications   - BP control as above - continue home dose of metoprolol and amlodipine with hold parameters.  - continue to monitor BP

## 2021-03-14 NOTE — H&P ADULT - PROBLEM SELECTOR PLAN 7
continue home dose of timolol and brimonidine with PRN artificial tears - therapeutic equivalent of atorvastatin

## 2021-03-14 NOTE — DIETITIAN INITIAL EVALUATION ADULT. - PROBLEM SELECTOR PLAN 3
home ML anemia of chronic disease/inflammation related to his stage 4 CKD, no reported recent bleeding, monitor.

## 2021-03-14 NOTE — H&P ADULT - PROBLEM SELECTOR PLAN 4
- continue home dose of metoprolol and amlodipine  - continue to monitor BP Continue Carbidopa/Levodopa.

## 2021-03-14 NOTE — H&P ADULT - ASSESSMENT
Patient is an 85 yo male BIBA from [place] with a PMH of cognitive impairment, Parkinson's, UC, HTN, HLD, CKD, glaucoma, acoustic neuroma that presents after 3 falls within 24 hours.  87 yo male presented from [facility] with a PMH of PMH of HTN, Dyslipidemia, CKD stage 4, Parkinson's Disease, Cognitive Impairment, UC, glaucoma, and acoustic neuroma, after 3 falls within 24 hours.

## 2021-03-14 NOTE — H&P ADULT - PROBLEM SELECTOR PLAN 1
Patient presets with recurrent mechanical falls.  No LOC.  Head CT demonstrated no acute intracranial hemorrhage, mass effect, or acute displaced calvarium fracture. Patient will need higher level of care at discharge.  - admit to medicine   - social work consult   - fall risk protocol Patient presets with recurrent falls.  No LOC.  Head CT demonstrated no acute intracranial hemorrhage, mass effect, or acute displaced calvarium fracture. Patient will need higher level of care at discharge.  - admit to medicine, fall risk protocol, PT evaluation in am, case management & SW consults.

## 2021-03-14 NOTE — H&P ADULT - NSHPLABSRESULTS_GEN_ALL_CORE
Labs:                          10.5   7.60  )-----------( 201      ( 13 Mar 2021 22:08 )             33.1       03-13    142  |  108  |  42<H>  ----------------------------<  110<H>  4.6   |  23  |  2.54<H>    Ca    9.4      13 Mar 2021 22:08    TPro  7.2  /  Alb  3.4  /  TBili  0.4  /  DBili  x   /  AST  25  /  ALT  25  /  AlkPhos  144<H>  03-13        PT/INR - ( 13 Mar 2021 22:08 )   PT: 13.6 sec;   INR: 1.13 ratio        PTT - ( 13 Mar 2021 22:08 )  PTT:42.1 sec      CARDIAC MARKERS ( 13 Mar 2021 22:08 )  .018 ng/mL / x     / x     / x     / x          < from: CT Head No Cont (03.13.21 @ 22:31) >    EXAM:  CT BRAIN                                  PROCEDURE DATE:  03/13/2021          INTERPRETATION:  NONCONTRAST CT OF THE BRAIN    CLINICAL HISTORY: Altered mental status. Multiple falls.    TECHNIQUE: Axial CT images are obtained from the cranial vertex to the skull base without the administration of IV contrast.    CT head 3/13/2021..    FINDINGS:    Beam hardening artifact slightly obscures evaluation of the posterior fossa and brainstem.    There is no acute intra-axial or extra-axial hemorrhage. No mass effect or shift of the midline. The basal cisterns are not effaced. There is cerebral volume loss with prominence of the ventricles and sulci. There are patchy areas of low attenuation within the periventricular and subcortical whitematter which are nonspecific but likely the sequela of chronic microvascular change. There is no CT evidence of a large vascular territory infarct.    The mucosal wall thickening of bilateral maxillary sinuses, left greater than right. Partially opacified left frontoethmoid air cells. Suggestion of mild air-fluid level in left maxillary sinus. Mastoid air cells are well aerated. Replaced bilateral ocular lenses.    No acute displaced calvarium fracture. No significant scalp soft tissue swelling.    IMPRESSION:    No acute intracranial hemorrhage, mass effect, or acute displaced calvarium fracture. Paranasal sinus disease as described.    If there are new or persistent symptoms, consider further evaluation with MRI provided no contraindications. Labs:                          10.5   7.60  )-----------( 201      ( 13 Mar 2021 22:08 )             33.1       03-13    142  |  108  |  42<H>  ----------------------------<  110<H>  4.6   |  23  |  2.54<H>    Ca    9.4      13 Mar 2021 22:08    TPro  7.2  /  Alb  3.4  /  TBili  0.4  /  DBili  x   /  AST  25  /  ALT  25  /  AlkPhos  144<H>  03-13            PT/INR - ( 13 Mar 2021 22:08 )   PT: 13.6 sec;   INR: 1.13 ratio    PTT - ( 13 Mar 2021 22:08 )  PTT:42.1 sec      CARDIAC MARKERS ( 13 Mar 2021 22:08 )  .018 ng/mL / x     / x     / x     / x          COVID-19 PCR: NotDetec (13 Mar 2021 22:09)        CT Head No Cont (03.13.21 @ 22:31) >    EXAM:  CT BRAIN                        PROCEDURE DATE:  03/13/2021    INTERPRETATION:  NONCONTRAST CT OF THE BRAIN  CLINICAL HISTORY: Altered mental status. Multiple falls.  TECHNIQUE: Axial CT images are obtained from the cranial vertex to the skull base without the administration of IV contrast.  CT head 3/13/2021..  FINDINGS:  Beam hardening artifact slightly obscures evaluation of the posterior fossa and brainstem.  There is no acute intra-axial or extra-axial hemorrhage. No mass effect or shift of the midline. The basal cisterns are not effaced. There is cerebral volume loss with prominence of the ventricles and sulci. There are patchy areas of low attenuation within the periventricular and subcortical whitematter which are nonspecific but likely the sequela of chronic microvascular change. There is no CT evidence of a large vascular territory infarct.  The mucosal wall thickening of bilateral maxillary sinuses, left greater than right. Partially opacified left frontoethmoid air cells. Suggestion of mild air-fluid level in left maxillary sinus. Mastoid air cells are well aerated. Replaced bilateral ocular lenses.  No acute displaced calvarium fracture. No significant scalp soft tissue swelling.  IMPRESSION:  No acute intracranial hemorrhage, mass effect, or acute displaced calvarium fracture. Paranasal sinus disease as described.  If there are new or persistent symptoms, consider further evaluation with MRI provided no contraindications.        CXR:    As per my review shows poor inspiratory effort, mild unfolding of the aorta, normal cardiac shadow size, clear lung fields B/L, no pulmonary infiltrates, pleural effusion, or pneumothorax. Pending official report.         EKG:    As per my review shows SR at 80/min, normal KS & prolonged QTc intervals, IVCD, RAD (+ 100), normal QRS voltage, with normal transition, no ST-T abnormality.      -

## 2021-03-14 NOTE — H&P ADULT - NSHPSOCIALHISTORY_GEN_ALL_CORE
Patient currently resides at [facility].  Was a former smoker in his teens, quit after 2 years and smoked <1ppd -    , currently resides at [facility], was a former smoker in his teens, quit after 2 years and smoked <1ppd, no ETOH or drug abuse.

## 2021-03-14 NOTE — H&P ADULT - PROBLEM SELECTOR PLAN 3
- continue home dose of carbidopa/levodopa ML anemia of chronic disease/inflammation related to his stage 4 CKD, no reported recent bleeding, monitor.

## 2021-03-14 NOTE — PHYSICAL THERAPY INITIAL EVALUATION ADULT - PERTINENT HX OF CURRENT PROBLEM, REHAB EVAL
from [place] assisted [facility] with a PMH of HTN, Dyslipidemia, CKD stage 4, Parkinson's Disease, Cognitive Impairment, UC, glaucoma, and acoustic neuroma that presents after 3 falls within 24 hours. Of note patient is a poor historian with fluctuating levels of awareness.patient states that he went to get up from his chair and took 2 steps before loosing his balance and falling to the ground hitting his head and elbows.  .

## 2021-03-14 NOTE — DIETITIAN INITIAL EVALUATION ADULT. - OTHER INFO
Initial assessment:   Patient is an 87 yo male BIBA from [facility] assisted [facility] with a PMH of HTN, Dyslipidemia, CKD stage 4, Parkinson's Disease, Cognitive Impairment, UC, glaucoma, and acoustic neuroma that presents after 3 falls within 24 hours. Of note patient is a poor historian with fluctuating levels of awareness. Patient was seen in the ED on 3/12 as well as twice on 3/13.  Last night patient states that he went to get up from his chair and took 2 steps before loosing his balance and falling to the ground hitting his head and elbows.  Patient walks with a walker at baseline as states he has done so for a "couple years."  Multiple abrasions on head and all extremities. Dressings places on bilateral arms and legs.  Bilateral stasis dermatitis.  Healing ulceration to R posterior lower leg. Ct head: no acute intracranial hemorrhage. Pt has teeth/dentures, tolerating regular consistency. Needs total assistance and encouragement with meals 2/2 cognitive limitations. Poor po intake since admission. Labs reviewed, elevated BUN noted. NFPE completed. Meets criteria for non severe (moderate) malnutrition in the context of acute illness: muscle loss wasting of temples mild, wasting of orbital muscle mass mild, buccal fat mild and energy intake < 75% of estimated energy requirement for > 7 days. BMI 21.4 (low for age). NKA to food. Admitted with pressure ulcer (see skin assessment) Continue with vitamin mineral supplementation.

## 2021-03-14 NOTE — DIETITIAN INITIAL EVALUATION ADULT. - PROBLEM SELECTOR PLAN 2
Patient has multiple abrasions on his head and all extremities including a healing ulcer on the posterior aspect of his R calf, no signs suggestive of infection.  - bacitracin and sterile gauze to active wounds.

## 2021-03-14 NOTE — H&P ADULT - NSHPPHYSICALEXAM_GEN_ALL_CORE
PHYSICAL EXAM:  Vital Signs Last 24 Hrs  T(C): 36.9 (14 Mar 2021 01:30), Max: 37.1 (13 Mar 2021 12:29)  T(F): 98.5 (14 Mar 2021 01:30), Max: 98.8 (13 Mar 2021 12:29)  HR: 58 (14 Mar 2021 01:30) (55 - 59)  BP: 154/71 (14 Mar 2021 01:30) (144/66 - 169/74)  BP(mean): --  RR: 16 (14 Mar 2021 01:30) (16 - 18)  SpO2: 98% (14 Mar 2021 01:30) (96% - 100%)    CONSTITUTIONAL: Awake, alert, in no apparent distress.  ENMT: Airway patent, moist mucus membranes  EYES: Clear bilaterally, pupils equal, round and reactive to light.  CARDIAC: Normal rate, regular rhythm.   no murmurs rubs or gallops  RESPIRATORY: Breath sounds clear and equal bilaterally. No wheezing, rales or rhonchi.  GASTROINTESTINAL: Abdomen soft, non-tender, no guarding. Bowel sounds present.  MUSCULOSKELETAL: No joint tenderness.  NEUROLOGICAL: fluctuating alertness and orientation. No focal deficits, no motor or sensory deficits.  SKIN:  Multiple abrasions on head and all extremities. Dressings places on bilateral arms and legs.  Bilateral chronic venous states.  Healing ulceration to R posterior lower leg. -    Vital Signs Last 24 Hrs  T(C): 36.9 (14 Mar 2021 01:30), Max: 37.1 (13 Mar 2021 12:29)  T(F): 98.5 (14 Mar 2021 01:30), Max: 98.8 (13 Mar 2021 12:29)  HR: 58 (14 Mar 2021 01:30) (55 - 59)  BP: 154/71 (14 Mar 2021 01:30) (144/66 - 169/74)  BP(mean): --  RR: 16 (14 Mar 2021 01:30) (16 - 18)  SpO2: 98% (14 Mar 2021 01:30) (96% - 100%)        Physical Exam:  CONSTITUTIONAL: Awake, alert, in no apparent distress.  ENMT: Airway patent, moist mucus membranes  EYES: Clear conjunctiva bilaterally, pupils equal, round and reactive to light, (+) B/L IOL.  CARDIAC: Normal rate, regular rhythm, normal S1 & acc S2, no murmurs or extra sounds.   RESPIRATORY: Breath sounds clear and equal bilaterally. No wheezing, rales or rhonchi.  GASTROINTESTINAL: Abdomen soft, non-tender, no guarding. Bowel sounds present.  MUSCULOSKELETAL: No joint tenderness.  NEUROLOGICAL: fluctuating alertness and orientation. No focal deficits, no motor or sensory deficits.  SKIN:  Multiple abrasions on head and all extremities. Dressings places on bilateral arms and legs.  Bilateral stasis dermatitis.  Healing ulceration to R posterior lower leg.  VASCULAR: 2+ radial, brachial pulses B/L, no carotid bruits.  PSYCH: No agitation.

## 2021-03-14 NOTE — DIETITIAN NUTRITION RISK NOTIFICATION - ADDITIONAL COMMENTS/DIETITIAN RECOMMENDATIONS
Initial assessment:   Patient is an 85 yo male BIBA from [facility] assisted [facility] with a PMH of HTN, Dyslipidemia, CKD stage 4, Parkinson's Disease, Cognitive Impairment, UC, glaucoma, and acoustic neuroma that presents after 3 falls within 24 hours. Of note patient is a poor historian with fluctuating levels of awareness. Patient was seen in the ED on 3/12 as well as twice on 3/13.  Last night patient states that he went to get up from his chair and took 2 steps before loosing his balance and falling to the ground hitting his head and elbows.  Patient walks with a walker at baseline as states he has done so for a "couple years."  Multiple abrasions on head and all extremities. Dressings places on bilateral arms and legs.  Bilateral stasis dermatitis.  Healing ulceration to R posterior lower leg. Ct head: no acute intracranial hemorrhage. Pt has teeth/dentures, tolerating regular consistency. Needs total assistance and encouragement with meals 2/2 cognitive limitations. Poor po intake since admission. Labs reviewed, elevated BUN noted. NFPE completed. Meets criteria for non severe (moderate) malnutrition in the context of acute illness: muscle loss wasting of temples mild, wasting of orbital muscle mass mild, buccal fat mild and energy intake < 75% of estimated energy requirement for > 7 days. BMI 21.4 (low for age). NKA to food. Admitted with pressure ulcer (see skin assessment) Continue with vitamin mineral supplementation.    Recommendations:  -total assistance and encouragement with meals,   -magic cup BID,   -wound care protocol, continue with vitamin/mineral supplementation  -monitor weight   -suggest d/c phos/K restrictions, encourage po fluids     RD to remain available.

## 2021-03-14 NOTE — PHYSICAL THERAPY INITIAL EVALUATION ADULT - IMPAIRMENTS CONTRIBUTING TO GAIT DEVIATIONS, PT EVAL
shuffling gait, difficulty with turns, vc's to stay inside RW/impaired balance/cognition/narrow base of support

## 2021-03-14 NOTE — H&P ADULT - PROBLEM SELECTOR PLAN 2
Patient has multiple abrasions on his head and all extremities including a healing ulcer on the posterior aspect of his R calf.  - bacitracin and sterile gauze to active wounds  - wound care consult Patient has multiple abrasions on his head and all extremities including a healing ulcer on the posterior aspect of his R calf, no signs suggestive of infection.  - bacitracin and sterile gauze to active wounds.

## 2021-03-14 NOTE — H&P ADULT - PROBLEM SELECTOR PLAN 5
- therapeutic equivalent of atorvastatin - avoid nephrotoxic medications and renally dose medications, BP control as above.

## 2021-03-15 LAB
ANION GAP SERPL CALC-SCNC: 18 MMOL/L — HIGH (ref 5–17)
BUN SERPL-MCNC: 40 MG/DL — HIGH (ref 7–23)
CALCIUM SERPL-MCNC: 9.7 MG/DL — SIGNIFICANT CHANGE UP (ref 8.4–10.5)
CHLORIDE SERPL-SCNC: 108 MMOL/L — SIGNIFICANT CHANGE UP (ref 96–108)
CO2 SERPL-SCNC: 17 MMOL/L — LOW (ref 22–31)
CREAT SERPL-MCNC: 2.53 MG/DL — HIGH (ref 0.5–1.3)
GLUCOSE SERPL-MCNC: 132 MG/DL — HIGH (ref 70–99)
HCT VFR BLD CALC: 41.1 % — SIGNIFICANT CHANGE UP (ref 39–50)
HGB BLD-MCNC: 12.7 G/DL — LOW (ref 13–17)
MCHC RBC-ENTMCNC: 29.5 PG — SIGNIFICANT CHANGE UP (ref 27–34)
MCHC RBC-ENTMCNC: 30.9 GM/DL — LOW (ref 32–36)
MCV RBC AUTO: 95.6 FL — SIGNIFICANT CHANGE UP (ref 80–100)
NRBC # BLD: 0 /100 WBCS — SIGNIFICANT CHANGE UP (ref 0–0)
PLATELET # BLD AUTO: 234 K/UL — SIGNIFICANT CHANGE UP (ref 150–400)
POTASSIUM SERPL-MCNC: 5.5 MMOL/L — HIGH (ref 3.5–5.3)
POTASSIUM SERPL-SCNC: 5.5 MMOL/L — HIGH (ref 3.5–5.3)
RBC # BLD: 4.3 M/UL — SIGNIFICANT CHANGE UP (ref 4.2–5.8)
RBC # FLD: 13.3 % — SIGNIFICANT CHANGE UP (ref 10.3–14.5)
SODIUM SERPL-SCNC: 143 MMOL/L — SIGNIFICANT CHANGE UP (ref 135–145)
WBC # BLD: 13.62 K/UL — HIGH (ref 3.8–10.5)
WBC # FLD AUTO: 13.62 K/UL — HIGH (ref 3.8–10.5)

## 2021-03-15 PROCEDURE — 99233 SBSQ HOSP IP/OBS HIGH 50: CPT

## 2021-03-15 RX ADMIN — Medication 50 MILLIGRAM(S): at 17:27

## 2021-03-15 RX ADMIN — CARBIDOPA AND LEVODOPA 1 TABLET(S): 25; 100 TABLET ORAL at 13:23

## 2021-03-15 RX ADMIN — ATORVASTATIN CALCIUM 40 MILLIGRAM(S): 80 TABLET, FILM COATED ORAL at 21:33

## 2021-03-15 RX ADMIN — Medication 1 APPLICATION(S): at 06:59

## 2021-03-15 RX ADMIN — Medication 1 APPLICATION(S): at 17:28

## 2021-03-15 RX ADMIN — HEPARIN SODIUM 5000 UNIT(S): 5000 INJECTION INTRAVENOUS; SUBCUTANEOUS at 21:33

## 2021-03-15 RX ADMIN — CARBIDOPA AND LEVODOPA 1 TABLET(S): 25; 100 TABLET ORAL at 06:59

## 2021-03-15 RX ADMIN — CARBIDOPA AND LEVODOPA 1 TABLET(S): 25; 100 TABLET ORAL at 21:33

## 2021-03-15 RX ADMIN — Medication 1 APPLICATION(S): at 17:27

## 2021-03-15 RX ADMIN — Medication 81 MILLIGRAM(S): at 11:15

## 2021-03-15 RX ADMIN — Medication 1 TABLET(S): at 11:16

## 2021-03-15 RX ADMIN — AMLODIPINE BESYLATE 10 MILLIGRAM(S): 2.5 TABLET ORAL at 06:59

## 2021-03-15 RX ADMIN — HEPARIN SODIUM 5000 UNIT(S): 5000 INJECTION INTRAVENOUS; SUBCUTANEOUS at 06:59

## 2021-03-15 RX ADMIN — BRIMONIDINE TARTRATE 1 DROP(S): 2 SOLUTION/ DROPS OPHTHALMIC at 06:58

## 2021-03-15 RX ADMIN — Medication 1 APPLICATION(S): at 07:00

## 2021-03-15 RX ADMIN — SENNA PLUS 2 TABLET(S): 8.6 TABLET ORAL at 21:33

## 2021-03-15 RX ADMIN — Medication 5 MILLIGRAM(S): at 21:33

## 2021-03-15 RX ADMIN — Medication 50 MILLIGRAM(S): at 06:59

## 2021-03-15 RX ADMIN — Medication 1 DROP(S): at 17:26

## 2021-03-15 RX ADMIN — HEPARIN SODIUM 5000 UNIT(S): 5000 INJECTION INTRAVENOUS; SUBCUTANEOUS at 13:23

## 2021-03-15 RX ADMIN — Medication 1 DROP(S): at 07:00

## 2021-03-15 RX ADMIN — BRIMONIDINE TARTRATE 1 DROP(S): 2 SOLUTION/ DROPS OPHTHALMIC at 17:27

## 2021-03-15 NOTE — CONSULT NOTE ADULT - SUBJECTIVE AND OBJECTIVE BOX
Patient is a 86y old  Male who presents with a chief complaint of Recurrent Falls (15 Mar 2021 09:55)    HPI: 85 yo male BIBA from [place] assisted with a PMH of HTN, Dyslipidemia, CKD stage 4, Parkinson's Disease, Cognitive Impairment, UC, glaucoma, and acoustic neuroma that presented on 3/13 after 3 falls within 24 hours. Of note patient is a poor historian with fluctuating levels of awareness. Last night patient states that he went to get up from his chair and took 2 steps before loosing his balance and falling to the ground hitting his head and elbows.  Patient walks with a walker at baseline as states he has done so for a "couple years."  Multiple inconsistencies in his story as he originally stated that his R should hurt but later on stated he was in no pain and was unsure if he hit his head.  (14 Mar 2021 04:13)  No lateralized weakness.  No Facial asymmetry.  No difficulty speaking.  Pt was found to have renal insufficiency.  CT Head  -No acute pathology.  CT C spine  -DJD. No Fx.    DRAFT ONLY    PAST MEDICAL & SURGICAL HISTORY:  Parkinsons    UC (ulcerative colitis)    HTN (hypertension)    HTN (hypertension)    Gout    Chronic kidney disease    Acoustic neuroma    No significant past surgical history    No significant past surgical history    MEDICATIONS  (STANDING):  amLODIPine   Tablet 10 milliGRAM(s) Oral daily  aspirin enteric coated 81 milliGRAM(s) Oral daily  atorvastatin 40 milliGRAM(s) Oral at bedtime  BACItracin   Ointment 1 Application(s) Topical two times a day  brimonidine 0.2% Ophthalmic Solution 1 Drop(s) Both EYES two times a day  carbidopa/levodopa  25/100 1 Tablet(s) Oral three times a day  heparin   Injectable 5000 Unit(s) SubCutaneous every 8 hours  melatonin 5 milliGRAM(s) Oral at bedtime  metoprolol tartrate 50 milliGRAM(s) Oral two times a day  mineral oil/petrolatum Hydrophilic Ointment 1 Application(s) Topical two times a day  multivitamin/minerals 1 Tablet(s) Oral daily  senna 2 Tablet(s) Oral at bedtime  timolol 0.5% Solution 1 Drop(s) Both EYES two times a day    MEDICATIONS  (PRN):  acetaminophen   Tablet .. 650 milliGRAM(s) Oral every 8 hours PRN Temp greater or equal to 38.5C (101.3F), Moderate Pain (4 - 6), Severe Pain (7 - 10)  artificial  tears Solution 1 Drop(s) Both EYES every 2 hours PRN Dry Eyes  magnesium hydroxide Suspension 30 milliLiter(s) Oral daily PRN Constipation  polyethylene glycol 3350 17 Gram(s) Oral daily PRN Constipation    Allergies    No Known Allergies    SOCIAL HISTORY:    No h/o Smoking.   No h/o alcohol use.    FAMILY HISTORY:    No pertinent family history in first degree relatives    REVIEW OF SYSTEMS:    CONSTITUTIONAL: No fever  EYES: No eye pain,   ENMT:  No sinus or throat pain  NECK: No pain or stiffness  RESPIRATORY: No cough, No hemoptysis; No shortness of breath  CARDIOVASCULAR: No acute chest pain, palpitations,  or leg swelling  GASTROINTESTINAL: No abdominal pain. No nausea, vomiting, or hematemesis;  No melena or hematochezia.  GENITOURINARY: No  hematuria, or incontinence  MUSCULOSKELETAL: No joint swelling; No extremity pain  SKIN: No itching, rashes, or lesions   LYMPH NODES: No enlarged glands  NEUROLOGICAL: No headaches, memory loss,   PSYCHIATRIC: No depression, anxiety, mood swings, or difficulty sleeping  ENDOCRINE: No heat or cold intolerance;   HEME/LYMPH: No easy bruising, or bleeding gums  Allergy/Immunology. No medication allergy. No seasonal allergies.    PHYSICAL EXAM:  Vital Signs Last 24 Hrs  T(F): 99.7 (03-15-21 @ 10:46)  HR: 80 (03-15-21 @ 11:45)  BP: 137/71 (03-15-21 @ 11:45)  RR: 17 (03-15-21 @ 10:46)    GENERAL: NAD, well-groomed, well-developed  HEAD:  Atraumatic, Normocephalic  EYES: EOMI, PERRLA, conjunctiva and sclera clear  NECK: Supple, No JVD, thyroid non-palpable    On Neurological Examination:    Mental Status - Pt is alert, awake, oriented X3. Higher functions are intact. Pt. does have mild poor cognition. Follows commands well and able to answer questions appropriately.    Speech -  Normal. Slurred. Pt has no aphasia.    Cranial Nerves - Pupils 3 mm equal and reactive to light, extraocular eye movements intact. Pt has no visual field deficit.  Pt has no right left facial asymmetry. Tongue - is in midline.    Motor Exam - 4 plus/5 all over, No drift. No shaking or tremors.  Muscle tone - is normal all over. Moves all extremities equally. No asymmetry is seen.      Sensory Exam - Pin prick, temperature, joint position and vibration are intact on either side. Pt withdraws all extremities equally on stimulation. No asymmetry seen. No complaints of tingling, numbness.    Gait - Able to stand and walk unassisted. Pt is able to stand up with holding my hands and is able to walk for few feet around the bed. Not falling to either side.    Deep tendon Reflexes - 2 plus all over.    Coordination - Fine finger movements are normal on both sides. Finger to nose is also normal on both sides.       Romberg - Negative.    Neck Supple -  Yes.    LABS:                        12.7   13.62 )-----------( 234      ( 15 Mar 2021 07:53 )             41.1     03-15    143  |  108  |  40<H>  ----------------------------<  132<H>  5.5<H>   |  17<L>  |  2.53<H>    Ca    9.7      15 Mar 2021 07:53    TPro  7.2  /  Alb  3.4  /  TBili  0.4  /  DBili  x   /  AST  25  /  ALT  25  /  AlkPhos  144<H>  03-13    PT/INR - ( 14 Mar 2021 07:01 )   PT: 13.3 sec;   INR: 1.10 ratio         PTT - ( 14 Mar 2021 07:01 )  PTT:42.0 sec  Urinalysis Basic - ( 14 Mar 2021 10:56 )    Color: Yellow / Appearance: Clear / S.020 / pH: x  Gluc: x / Ketone: Trace  / Bili: Negative / Urobili: Negative mg/dL   Blood: x / Protein: 100 mg/dL / Nitrite: Negative   Leuk Esterase: Negative / RBC: 0-2 /HPF / WBC 0-2   Sq Epi: x / Non Sq Epi: Occasional / Bacteria: Occasional    RADIOLOGY & ADDITIONAL STUDIES:       Patient is a 86y old  Male who presents with a chief complaint of Recurrent Falls (15 Mar 2021 09:55)    HPI: 87 yo male BIBA from [place] assisted with a PMH of HTN, Dyslipidemia, CKD stage 4, Parkinson's Disease, Cognitive Impairment, UC, glaucoma, and acoustic neuroma that presented on 3/13 after 3 falls within 24 hours. Of note patient is a poor historian with fluctuating levels of awareness. Last night patient states that he went to get up from his chair and took 2 steps before loosing his balance and falling to the ground hitting his head and elbows.  Patient walks with a walker at baseline as states he has done so for a "couple years."  Multiple inconsistencies in his story as he originally stated that his R should hurt but later on stated he was in no pain and was unsure if he hit his head.  (14 Mar 2021 04:13)  No lateralized weakness.  No Facial asymmetry.  Pt was found to have renal insufficiency.  CT Head  -No acute pathology.  CT C spine  -DJD. No Fx.    PAST MEDICAL & SURGICAL HISTORY:    Parkinsons    UC (ulcerative colitis)    HTN (hypertension)    HTN (hypertension)    Gout    Chronic kidney disease    Acoustic neuroma    No significant past surgical history    MEDICATIONS  (STANDING):  amLODIPine   Tablet 10 milliGRAM(s) Oral daily  aspirin enteric coated 81 milliGRAM(s) Oral daily  atorvastatin 40 milliGRAM(s) Oral at bedtime  BACItracin   Ointment 1 Application(s) Topical two times a day  brimonidine 0.2% Ophthalmic Solution 1 Drop(s) Both EYES two times a day  carbidopa/levodopa  25/100 1 Tablet(s) Oral three times a day  heparin   Injectable 5000 Unit(s) SubCutaneous every 8 hours  melatonin 5 milliGRAM(s) Oral at bedtime  metoprolol tartrate 50 milliGRAM(s) Oral two times a day  mineral oil/petrolatum Hydrophilic Ointment 1 Application(s) Topical two times a day  multivitamin/minerals 1 Tablet(s) Oral daily  senna 2 Tablet(s) Oral at bedtime  timolol 0.5% Solution 1 Drop(s) Both EYES two times a day    MEDICATIONS  (PRN):  acetaminophen   Tablet .. 650 milliGRAM(s) Oral every 8 hours PRN Temp greater or equal to 38.5C (101.3F), Moderate Pain (4 - 6), Severe Pain (7 - 10)  artificial  tears Solution 1 Drop(s) Both EYES every 2 hours PRN Dry Eyes  magnesium hydroxide Suspension 30 milliLiter(s) Oral daily PRN Constipation  polyethylene glycol 3350 17 Gram(s) Oral daily PRN Constipation    Allergies    No Known Allergies    SOCIAL HISTORY:    No h/o Smoking.   No h/o alcohol use.    FAMILY HISTORY:    No pertinent family history in first degree relatives    PHYSICAL EXAM:  Vital Signs Last 24 Hrs  T(F): 99.7 (03-15-21 @ 10:46)  HR: 80 (03-15-21 @ 11:45)  BP: 137/71 (03-15-21 @ 11:45)  RR: 17 (03-15-21 @ 10:46)    GENERAL: NAD, well-groomed, well-developed  HEAD:  Atraumatic, Normocephalic  EYES: EOMI, PERRLA, conjunctiva and sclera clear  NECK: Supple, No JVD,    On Neurological Examination:    Mask like facies.    Mental Status - Pt is alert, awake, Poor cognition. Follows simple commands.    Speech -  Slow, hypophonic. No aphasia.    Cranial Nerves - Pupils 3 mm equal and reactive to light.  Pt has no facial asymmetry. Tongue - is in midline.    Motor Exam - 4 plus/5 all over, No resting tremors. Has mod cogwheel and clasp knife rigidity.  Muscle tone - is increased all over.      Sensory Exam - Pt withdraws all extremities equally on stimulation. No asymmetry seen. No complaints of tingling, numbness.    Gait - Requires much assistance in standing.     Deep tendon Reflexes - 2 plus all over.    Coordination - unable to do.      Neck Supple -  Yes.    LABS:                        12.7   13.62 )-----------( 234      ( 15 Mar 2021 07:53 )             41.1     03-15    143  |  108  |  40<H>  ----------------------------<  132<H>  5.5<H>   |  17<L>  |  2.53<H>    Ca    9.7      15 Mar 2021 07:53    TPro  7.2  /  Alb  3.4  /  TBili  0.4  /  DBili  x   /  AST  25  /  ALT  25  /  AlkPhos  144<H>  03-13    PT/INR - ( 14 Mar 2021 07:01 )   PT: 13.3 sec;   INR: 1.10 ratio         PTT - ( 14 Mar 2021 07:01 )  PTT:42.0 sec  Urinalysis Basic - ( 14 Mar 2021 10:56 )    Color: Yellow / Appearance: Clear / S.020 / pH: x  Gluc: x / Ketone: Trace  / Bili: Negative / Urobili: Negative mg/dL   Blood: x / Protein: 100 mg/dL / Nitrite: Negative   Leuk Esterase: Negative / RBC: 0-2 /HPF / WBC 0-2   Sq Epi: x / Non Sq Epi: Occasional / Bacteria: Occasional    RADIOLOGY & ADDITIONAL STUDIES:    ct< from: CT Head No Cont (21 @ 22:31) >  No acute intracranial hemorrhage, mass effect, or acute displaced calvarium fracture. Paranasal sinus disease as described.    < end of copied text >  < from: CT Cervical Spine No Cont (21 @ 13:58) >  IMPRESSION: Degenerative change involving the cervical spine without evidence of fracture or dislocation seen.    < end of copied text >

## 2021-03-15 NOTE — CONSULT NOTE ADULT - ASSESSMENT
Pt is seen for Frequent Falls  H/o Parkinson disease  No syncopal episode is reported.  No signs of head injury.  No seizure activity is reported.  Limited but non focal exam.  Pt is Dehydrated with BUN/Cr - 40/2.53  CT Head - No acute pathology.  CT C spine  -No fx.  No signs of CVA.  H/o limited mobility sec to PD.  Deconditioning.  IV Fluids  Continue Sinemet 25/100 mg TID.  Would check Vitamin B12 level.  PT.  Fall/safety precautions.  D/w covering nurse.  Would continue to follow.

## 2021-03-15 NOTE — PROGRESS NOTE ADULT - SUBJECTIVE AND OBJECTIVE BOX
Subjective: Patient seen and examined. Doing well with no overnight events.     MEDICATIONS  (STANDING):  amLODIPine   Tablet 10 milliGRAM(s) Oral daily  aspirin enteric coated 81 milliGRAM(s) Oral daily  atorvastatin 40 milliGRAM(s) Oral at bedtime  BACItracin   Ointment 1 Application(s) Topical two times a day  brimonidine 0.2% Ophthalmic Solution 1 Drop(s) Both EYES two times a day  carbidopa/levodopa  25/100 1 Tablet(s) Oral three times a day  heparin   Injectable 5000 Unit(s) SubCutaneous every 8 hours  melatonin 5 milliGRAM(s) Oral at bedtime  metoprolol tartrate 50 milliGRAM(s) Oral two times a day  mineral oil/petrolatum Hydrophilic Ointment 1 Application(s) Topical two times a day  multivitamin/minerals 1 Tablet(s) Oral daily  senna 2 Tablet(s) Oral at bedtime  timolol 0.5% Solution 1 Drop(s) Both EYES two times a day    MEDICATIONS  (PRN):  acetaminophen   Tablet .. 650 milliGRAM(s) Oral every 8 hours PRN Temp greater or equal to 38.5C (101.3F), Moderate Pain (4 - 6), Severe Pain (7 - 10)  artificial  tears Solution 1 Drop(s) Both EYES every 2 hours PRN Dry Eyes  magnesium hydroxide Suspension 30 milliLiter(s) Oral daily PRN Constipation  polyethylene glycol 3350 17 Gram(s) Oral daily PRN Constipation      Allergies    No Known Allergies    Intolerances        Vital Signs Last 24 Hrs  T(C): 36.5 (15 Mar 2021 06:01), Max: 36.9 (14 Mar 2021 21:00)  T(F): 97.7 (15 Mar 2021 06:01), Max: 98.5 (15 Mar 2021 01:49)  HR: 75 (15 Mar 2021 06:01) (55 - 81)  BP: 143/72 (15 Mar 2021 06:01) (143/72 - 177/71)  BP(mean): --  RR: 18 (15 Mar 2021 06:01) (17 - 18)  SpO2: 93% (15 Mar 2021 06:01) (93% - 98%)    PHYSICAL EXAM:  GENERAL: NAD, well-groomed, well-developed  HEAD:  Atraumatic, Normocephalic  ENMT: Moist mucous membranes,   NECK: Supple, No JVD, Normal thyroid  NERVOUS SYSTEM:  All 4 extremities mobile, no gross sensory deficits.   CHEST/LUNG: Clear to auscultation bilaterally; No rales, rhonchi, wheezing, or rubs  HEART: Regular rate and rhythm; No murmurs, rubs, or gallops  ABDOMEN: Soft, Nontender, Nondistended; Bowel sounds present  EXTREMITIES:  2+ Peripheral Pulses, No clubbing, cyanosis, or edema      LABS:                        12.7   13.62 )-----------( 234      ( 15 Mar 2021 07:53 )             41.1     15 Mar 2021 07:53    143    |  108    |  40     ----------------------------<  132    5.5     |  17     |  2.53     Ca    9.7        15 Mar 2021 07:53      PT/INR - ( 14 Mar 2021 07:01 )   PT: 13.3 sec;   INR: 1.10 ratio         PTT - ( 14 Mar 2021 07:01 )  PTT:42.0 sec  Urinalysis Basic - ( 14 Mar 2021 10:56 )    Color: Yellow / Appearance: Clear / S.020 / pH: x  Gluc: x / Ketone: Trace  / Bili: Negative / Urobili: Negative mg/dL   Blood: x / Protein: 100 mg/dL / Nitrite: Negative   Leuk Esterase: Negative / RBC: 0-2 /HPF / WBC 0-2   Sq Epi: x / Non Sq Epi: Occasional / Bacteria: Occasional      CAPILLARY BLOOD GLUCOSE          RADIOLOGY & ADDITIONAL TESTS:    Imaging Personally Reviewed:  [ ] YES     Consultant(s) Notes Reviewed:      Care Discussed with Consultants/Other Providers:    Advanced Directives: [ ] DNR  [ ] No feeding tube  [ ] MOLST in chart  [ ] MOLST completed today  [ ] Unknown

## 2021-03-15 NOTE — PROGRESS NOTE ADULT - ASSESSMENT
86M with Parkinsons, Ulcerative Colitis, HTN, HLD and CKD 4 admitted for increase in falls at the Assisted Living Facility ([facility]).     Increased in Falls   Appears to be either worsening of Parkinson and Gait related issues   Had EP evaluation for bradycardia 2019 and was discontinued off B. Blockers   Telemetry shows no events overnight   Imaging reviewed; Will get Neurology consultation  PT consulted and will most likely need either PT outpatient    CKD 4  Baseline Creatinine appears to be around 2.4  Hyperkalemia noted and will repeat BMP later today   Making Urine and monitor BMP and electrolytes     HTN / HLD   Amlodipine  Atorvastatin  Metoprolol restarted? Will need to verify as this was discontinued in the past due to issues with Bradycardia    Parkinson Disease  Sinimet TID   Neurology to be consulted     UC  Not on any meds    Diet  Regular    DVT Prophylaxis  Heparin SC     Disposition  Full Code/Inpatient  Discharge planning pending hospital course

## 2021-03-16 LAB
ANION GAP SERPL CALC-SCNC: 14 MMOL/L — SIGNIFICANT CHANGE UP (ref 5–17)
ANION GAP SERPL CALC-SCNC: 14 MMOL/L — SIGNIFICANT CHANGE UP (ref 5–17)
BUN SERPL-MCNC: 50 MG/DL — HIGH (ref 7–23)
BUN SERPL-MCNC: 53 MG/DL — HIGH (ref 7–23)
CALCIUM SERPL-MCNC: 8.8 MG/DL — SIGNIFICANT CHANGE UP (ref 8.4–10.5)
CALCIUM SERPL-MCNC: 9.3 MG/DL — SIGNIFICANT CHANGE UP (ref 8.4–10.5)
CHLORIDE SERPL-SCNC: 111 MMOL/L — HIGH (ref 96–108)
CHLORIDE SERPL-SCNC: 113 MMOL/L — HIGH (ref 96–108)
CO2 SERPL-SCNC: 21 MMOL/L — LOW (ref 22–31)
CO2 SERPL-SCNC: 23 MMOL/L — SIGNIFICANT CHANGE UP (ref 22–31)
CREAT SERPL-MCNC: 2.49 MG/DL — HIGH (ref 0.5–1.3)
CREAT SERPL-MCNC: 2.82 MG/DL — HIGH (ref 0.5–1.3)
GLUCOSE SERPL-MCNC: 114 MG/DL — HIGH (ref 70–99)
GLUCOSE SERPL-MCNC: 141 MG/DL — HIGH (ref 70–99)
HCT VFR BLD CALC: 36 % — LOW (ref 39–50)
HGB BLD-MCNC: 11.5 G/DL — LOW (ref 13–17)
MCHC RBC-ENTMCNC: 30.1 PG — SIGNIFICANT CHANGE UP (ref 27–34)
MCHC RBC-ENTMCNC: 31.9 GM/DL — LOW (ref 32–36)
MCV RBC AUTO: 94.2 FL — SIGNIFICANT CHANGE UP (ref 80–100)
NRBC # BLD: 0 /100 WBCS — SIGNIFICANT CHANGE UP (ref 0–0)
PLATELET # BLD AUTO: 232 K/UL — SIGNIFICANT CHANGE UP (ref 150–400)
POTASSIUM SERPL-MCNC: 4.3 MMOL/L — SIGNIFICANT CHANGE UP (ref 3.5–5.3)
POTASSIUM SERPL-MCNC: 4.5 MMOL/L — SIGNIFICANT CHANGE UP (ref 3.5–5.3)
POTASSIUM SERPL-SCNC: 4.3 MMOL/L — SIGNIFICANT CHANGE UP (ref 3.5–5.3)
POTASSIUM SERPL-SCNC: 4.5 MMOL/L — SIGNIFICANT CHANGE UP (ref 3.5–5.3)
RBC # BLD: 3.82 M/UL — LOW (ref 4.2–5.8)
RBC # FLD: 13.5 % — SIGNIFICANT CHANGE UP (ref 10.3–14.5)
SODIUM SERPL-SCNC: 146 MMOL/L — HIGH (ref 135–145)
SODIUM SERPL-SCNC: 150 MMOL/L — HIGH (ref 135–145)
VIT B12 SERPL-MCNC: 514 PG/ML — SIGNIFICANT CHANGE UP (ref 232–1245)
WBC # BLD: 9.55 K/UL — SIGNIFICANT CHANGE UP (ref 3.8–10.5)
WBC # FLD AUTO: 9.55 K/UL — SIGNIFICANT CHANGE UP (ref 3.8–10.5)

## 2021-03-16 PROCEDURE — 99232 SBSQ HOSP IP/OBS MODERATE 35: CPT

## 2021-03-16 PROCEDURE — 99222 1ST HOSP IP/OBS MODERATE 55: CPT

## 2021-03-16 PROCEDURE — 99497 ADVNCD CARE PLAN 30 MIN: CPT | Mod: 25

## 2021-03-16 RX ORDER — SODIUM CHLORIDE 9 MG/ML
1000 INJECTION, SOLUTION INTRAVENOUS
Refills: 0 | Status: DISCONTINUED | OUTPATIENT
Start: 2021-03-16 | End: 2021-03-17

## 2021-03-16 RX ADMIN — CARBIDOPA AND LEVODOPA 1 TABLET(S): 25; 100 TABLET ORAL at 13:41

## 2021-03-16 RX ADMIN — Medication 1 APPLICATION(S): at 05:19

## 2021-03-16 RX ADMIN — Medication 1 APPLICATION(S): at 17:38

## 2021-03-16 RX ADMIN — BRIMONIDINE TARTRATE 1 DROP(S): 2 SOLUTION/ DROPS OPHTHALMIC at 05:19

## 2021-03-16 RX ADMIN — Medication 1 APPLICATION(S): at 17:42

## 2021-03-16 RX ADMIN — CARBIDOPA AND LEVODOPA 1 TABLET(S): 25; 100 TABLET ORAL at 05:19

## 2021-03-16 RX ADMIN — SENNA PLUS 2 TABLET(S): 8.6 TABLET ORAL at 22:37

## 2021-03-16 RX ADMIN — Medication 50 MILLIGRAM(S): at 17:38

## 2021-03-16 RX ADMIN — ATORVASTATIN CALCIUM 40 MILLIGRAM(S): 80 TABLET, FILM COATED ORAL at 22:40

## 2021-03-16 RX ADMIN — HEPARIN SODIUM 5000 UNIT(S): 5000 INJECTION INTRAVENOUS; SUBCUTANEOUS at 22:37

## 2021-03-16 RX ADMIN — BRIMONIDINE TARTRATE 1 DROP(S): 2 SOLUTION/ DROPS OPHTHALMIC at 17:38

## 2021-03-16 RX ADMIN — HEPARIN SODIUM 5000 UNIT(S): 5000 INJECTION INTRAVENOUS; SUBCUTANEOUS at 05:19

## 2021-03-16 RX ADMIN — Medication 1 TABLET(S): at 12:15

## 2021-03-16 RX ADMIN — Medication 1 DROP(S): at 05:19

## 2021-03-16 RX ADMIN — CARBIDOPA AND LEVODOPA 1 TABLET(S): 25; 100 TABLET ORAL at 22:37

## 2021-03-16 RX ADMIN — SODIUM CHLORIDE 100 MILLILITER(S): 9 INJECTION, SOLUTION INTRAVENOUS at 09:21

## 2021-03-16 RX ADMIN — Medication 81 MILLIGRAM(S): at 12:15

## 2021-03-16 RX ADMIN — AMLODIPINE BESYLATE 10 MILLIGRAM(S): 2.5 TABLET ORAL at 05:19

## 2021-03-16 RX ADMIN — Medication 50 MILLIGRAM(S): at 05:19

## 2021-03-16 RX ADMIN — Medication 1 DROP(S): at 17:38

## 2021-03-16 RX ADMIN — Medication 1 APPLICATION(S): at 05:20

## 2021-03-16 RX ADMIN — SODIUM CHLORIDE 100 MILLILITER(S): 9 INJECTION, SOLUTION INTRAVENOUS at 19:57

## 2021-03-16 RX ADMIN — Medication 5 MILLIGRAM(S): at 22:36

## 2021-03-16 NOTE — DISCHARGE NOTE NURSING/CASE MANAGEMENT/SOCIAL WORK - NSDCCRNAME_GEN_ALL_CORE_FT
[facility] on [address] [phone]/ fax [phone]. Utilizes [facility] pharmacy [phone]

## 2021-03-16 NOTE — PROGRESS NOTE ADULT - SUBJECTIVE AND OBJECTIVE BOX
Subjective: Patient seen and excamined.  Offers no complaints but is slow to respond and answer questions.     MEDICATIONS  (STANDING):  amLODIPine   Tablet 10 milliGRAM(s) Oral daily  aspirin enteric coated 81 milliGRAM(s) Oral daily  atorvastatin 40 milliGRAM(s) Oral at bedtime  BACItracin   Ointment 1 Application(s) Topical two times a day  brimonidine 0.2% Ophthalmic Solution 1 Drop(s) Both EYES two times a day  carbidopa/levodopa  25/100 1 Tablet(s) Oral three times a day  heparin   Injectable 5000 Unit(s) SubCutaneous every 8 hours  melatonin 5 milliGRAM(s) Oral at bedtime  metoprolol tartrate 50 milliGRAM(s) Oral two times a day  mineral oil/petrolatum Hydrophilic Ointment 1 Application(s) Topical two times a day  multivitamin/minerals 1 Tablet(s) Oral daily  senna 2 Tablet(s) Oral at bedtime  timolol 0.5% Solution 1 Drop(s) Both EYES two times a day    MEDICATIONS  (PRN):  acetaminophen   Tablet .. 650 milliGRAM(s) Oral every 8 hours PRN Temp greater or equal to 38.5C (101.3F), Moderate Pain (4 - 6), Severe Pain (7 - 10)  artificial  tears Solution 1 Drop(s) Both EYES every 2 hours PRN Dry Eyes  magnesium hydroxide Suspension 30 milliLiter(s) Oral daily PRN Constipation  polyethylene glycol 3350 17 Gram(s) Oral daily PRN Constipation      Allergies    No Known Allergies    Intolerances        Vital Signs Last 24 Hrs  T(C): 36.7 (16 Mar 2021 05:14), Max: 37.6 (15 Mar 2021 10:46)  T(F): 98 (16 Mar 2021 05:14), Max: 99.7 (15 Mar 2021 10:46)  HR: 67 (16 Mar 2021 05:14) (58 - 84)  BP: 163/74 (16 Mar 2021 05:14) (121/57 - 165/71)  BP(mean): --  RR: 17 (16 Mar 2021 05:14) (17 - 18)  SpO2: 96% (16 Mar 2021 05:14) (90% - 96%)    PHYSICAL EXAM:  GENERAL: NAD, well-groomed, well-developed  HEAD:  Atraumatic, Normocephalic  ENMT: Moist mucous membranes,   NECK: Supple, No JVD, Normal thyroid  NERVOUS SYSTEM:  All 4 extremities mobile, no gross sensory deficits.   CHEST/LUNG: Clear to auscultation bilaterally; No rales, rhonchi, wheezing, or rubs  HEART: Regular rate and rhythm; No murmurs, rubs, or gallops  ABDOMEN: Soft, Nontender, Nondistended; Bowel sounds present  EXTREMITIES:  2+ Peripheral Pulses, No clubbing, cyanosis, or edema      LABS:                        11.5   9.55  )-----------( 232      ( 16 Mar 2021 08:07 )             36.0     16 Mar 2021 08:07    150    |  113    |  50     ----------------------------<  141    4.3     |  23     |  2.49     Ca    9.3        16 Mar 2021 08:07        Urinalysis Basic - ( 14 Mar 2021 10:56 )    Color: Yellow / Appearance: Clear / S.020 / pH: x  Gluc: x / Ketone: Trace  / Bili: Negative / Urobili: Negative mg/dL   Blood: x / Protein: 100 mg/dL / Nitrite: Negative   Leuk Esterase: Negative / RBC: 0-2 /HPF / WBC 0-2   Sq Epi: x / Non Sq Epi: Occasional / Bacteria: Occasional      CAPILLARY BLOOD GLUCOSE          RADIOLOGY & ADDITIONAL TESTS:    Imaging Personally Reviewed:  [ ] YES     Consultant(s) Notes Reviewed:      Care Discussed with Consultants/Other Providers:    Advanced Directives: [ ] DNR  [ ] No feeding tube  [ ] MOLST in chart  [ ] MOLST completed today  [ ] Unknown

## 2021-03-16 NOTE — CONSULT NOTE ADULT - ASSESSMENT
86M with Parkinsons, Ulcerative Colitis, HTN, HLD and CKD 4 admitted for increase in falls at the Assisted Living Facility ([facility]).     Moderate protein-calorie malnutrition  CKD 4  Baseline Creatinine appears to be around 2.4    Increased in Falls with Abrasions of multiple sites   Appears to be either worsening of Parkinson and Gait related issues   Had EP evaluation for bradycardia 2019 and was discontinued off B. Blockers   Telemetry shows no events overnight   Imaging reviewed; Will get Neurology consultation  PT consulted and will most likely need either PT inpatient vs outpatient    Parkinson Disease  Sinimet TID   Neurology to be consulted     HCPs: kun Casey/Abdifatah Casey  [phone]   86M with Parkinsons, Ulcerative Colitis, HTN, HLD and CKD 4 admitted for increase in falls at the Assisted Living Facility ([facility]).     1) Parkinson's disease with moderate dementia and multiple falls/abrasions to skin  2) Moderate protein-calorie malnutrition  3) CKD 4 (baseline Cr ~2.4)  - PT and neurology recs noted  - on Sinemet  - d/c to [facility]    4) Failure to thrive, declining functional status, advanced age  5) Goals of care/advance care planning  - Palliative performance scale score: 50% (poor)  - Prognosis: months-years  - Code status: full code/full court press (by bryan). D/c to [facility]. Awaiting decision regarding code status. Will follow up tomorrow.   - HCP: pt's spns Mendez Casey/Abdifatah Casey  - Psychosocial support provided  - SW to start referral to home hospice    Appreciate consult. Discussed with the primary team. Will follow.     Della Flores MD

## 2021-03-16 NOTE — PROGRESS NOTE ADULT - SUBJECTIVE AND OBJECTIVE BOX
Neurology Follow up note    TOBIAS SAMUELS 86y Male    HPI: 87 yo male BIBA from [place] assisted with a PMH of HTN, Dyslipidemia, CKD stage 4, Parkinson's Disease, Cognitive Impairment, UC, glaucoma, and acoustic neuroma that presented on 3/13 after 3 falls within 24 hours. Of note patient is a poor historian with fluctuating levels of awareness. Last night patient states that he went to get up from his chair and took 2 steps before loosing his balance and falling to the ground hitting his head and elbows.  Patient walks with a walker at baseline as states he has done so for a "couple years."  Multiple inconsistencies in his story as he originally stated that his R should hurt but later on stated he was in no pain and was unsure if he hit his head.  (14 Mar 2021 04:13)  No lateralized weakness.  No Facial asymmetry.  Pt was found to have renal insufficiency.  CT Head  -No acute pathology.  CT C spine  -DJD. No Fx.    Interval History -    Patient is seen, chart was reviewed and case was discussed with the treatment team.    Vital Signs Last 24 Hrs  T(C): 37 (16 Mar 2021 14:45), Max: 37.4 (15 Mar 2021 21:57)  T(F): 98.6 (16 Mar 2021 14:45), Max: 99.4 (15 Mar 2021 21:57)  HR: 68 (16 Mar 2021 14:45) (58 - 84)  BP: 125/71 (16 Mar 2021 14:45) (121/57 - 165/71)  BP(mean): --  RR: 18 (16 Mar 2021 14:45) (17 - 18)  SpO2: 96% (16 Mar 2021 14:45) (90% - 96%)    MEDICATIONS    acetaminophen   Tablet .. 650 milliGRAM(s) Oral every 8 hours PRN  amLODIPine   Tablet 10 milliGRAM(s) Oral daily  artificial  tears Solution 1 Drop(s) Both EYES every 2 hours PRN  aspirin enteric coated 81 milliGRAM(s) Oral daily  atorvastatin 40 milliGRAM(s) Oral at bedtime  BACItracin   Ointment 1 Application(s) Topical two times a day  brimonidine 0.2% Ophthalmic Solution 1 Drop(s) Both EYES two times a day  carbidopa/levodopa  25/100 1 Tablet(s) Oral three times a day  dextrose 5%. 1000 milliLiter(s) IV Continuous <Continuous>  heparin   Injectable 5000 Unit(s) SubCutaneous every 8 hours  magnesium hydroxide Suspension 30 milliLiter(s) Oral daily PRN  melatonin 5 milliGRAM(s) Oral at bedtime  metoprolol tartrate 50 milliGRAM(s) Oral two times a day  mineral oil/petrolatum Hydrophilic Ointment 1 Application(s) Topical two times a day  multivitamin/minerals 1 Tablet(s) Oral daily  polyethylene glycol 3350 17 Gram(s) Oral daily PRN  senna 2 Tablet(s) Oral at bedtime  timolol 0.5% Solution 1 Drop(s) Both EYES two times a day    Allergies    No Known Allergies      On Neurological Examination:    Mask like facies.    Mental Status - Asleep. Arousable.  Poor cognition. Follows simple commands.    Speech -  Slow, hypophonic. No aphasia.    Cranial Nerves - Pupils 3 mm equal and reactive to light.  Pt has no facial asymmetry. Tongue - is in midline.    Motor Exam - 4 plus/5 all over, No resting tremors. Has mod cogwheel and clasp knife rigidity.  Muscle tone - is increased all over.      Sensory Exam - Pt withdraws all extremities equally on stimulation. No asymmetry seen. No complaints of tingling, numbness.    Gait - Requires much assistance in standing.     Deep tendon Reflexes - 2 plus all over.    Coordination - unable to do.      Neck Supple -  Yes.    LABS:    CBC Full  -  ( 16 Mar 2021 08:07 )  WBC Count : 9.55 K/uL  RBC Count : 3.82 M/uL  Hemoglobin : 11.5 g/dL  Hematocrit : 36.0 %  Platelet Count - Automated : 232 K/uL  Mean Cell Volume : 94.2 fl  Mean Cell Hemoglobin : 30.1 pg  Mean Cell Hemoglobin Concentration : 31.9 gm/dL    03-16    150<H>  |  113<H>  |  50<H>  ----------------------------<  141<H>  4.3   |  23  |  2.49<H>    Ca    9.3      16 Mar 2021 08:07    Vitamin B12, Serum: 514 pg/mL (03-16 @ 14:31)    RADIOLOGY & ADDITIONAL STUDIES:    ct< from: CT Head No Cont (03.13.21 @ 22:31) >  No acute intracranial hemorrhage, mass effect, or acute displaced calvarium fracture. Paranasal sinus disease as described.    < end of copied text >  < from: CT Cervical Spine No Cont (03.13.21 @ 13:58) >  IMPRESSION: Degenerative change involving the cervical spine without evidence of fracture or dislocation seen.    < end of copied text >

## 2021-03-16 NOTE — DISCHARGE NOTE NURSING/CASE MANAGEMENT/SOCIAL WORK - NSSCCARECORD_GEN_ALL_CORE
Home Care Agency/Durable Medical Equipment Agency/Community [facility] Durable Medical Equipment Agency/Community Resource

## 2021-03-16 NOTE — PROGRESS NOTE ADULT - ASSESSMENT
86M with Parkinsons, Ulcerative Colitis, HTN, HLD and CKD 4 admitted for increase in falls at the Assisted Living Facility ([facility]).     Increased in Falls   Appears to be either worsening of Parkinson and Gait related issues   Had EP evaluation for bradycardia 2019 and was discontinued off B. Blockers   Telemetry shows no events overnight   Imaging reviewed; Will get Neurology consultation  PT consulted and will most likely need either PT inpatient vs outpatient    Hypernatremia  Will start on D5W today for free water deficit   Will need to increase PO intake of free water  Repeat BMP in afternoon     CKD 4  Baseline Creatinine appears to be around 2.4  Hyperkalemia noted and will repeat BMP later today   Making Urine and monitor BMP and electrolytes     HTN / HLD   Amlodipine  Atorvastatin  Metoprolol restarted? Will need to verify as this was discontinued in the past due to issues with Bradycardia    Parkinson Disease  Sinimet TID   Neurology to be consulted     UC  Not on any meds    Diet  Regular    DVT Prophylaxis  Heparin SC     Disposition  Full Code/Inpatient  Discharge planning pending hospital course

## 2021-03-16 NOTE — PROGRESS NOTE ADULT - ASSESSMENT
Pt is seen for Frequent Falls  H/o Parkinson disease  No syncopal episode is reported.  No signs of head injury.  No seizure activity is reported.  Limited but non focal exam.  Pt is Dehydrated with BUN/Cr - 40/2.53, now 50/2.49  CT Head - No acute pathology.  CT C spine  -No fx.  No signs of CVA.  H/o limited mobility sec to PD.  Deconditioning.  IV Fluids  Continue Sinemet 25/100 mg TID.  Vitamin B12 level - 514  PT.  Fall/safety precautions.  D/w Dr. Oswald.  Would continue to follow.

## 2021-03-16 NOTE — DISCHARGE NOTE NURSING/CASE MANAGEMENT/SOCIAL WORK - PATIENT PORTAL LINK FT
You can access the FollowMyHealth Patient Portal offered by [facility] by registering at the following website: [email or web address]. By joining [employer]’s FollowMyHealth portal, you will also be able to view your health information using other applications (apps) compatible with our system.

## 2021-03-16 NOTE — CONSULT NOTE ADULT - CONVERSATION DETAILS
Reviewed patient's medical and social history as well as events leading to patient's hospitalization. Writer discussed patient's current diagnosis (dementia, parkinson's disease, h/o fall), medical condition and management, prognosis, and life expectancy. Inquired about patient's wishes regarding extent of medical care to be provided including escalation of medical care into the ICU and use of vasopressor support. In addition, the writer inquired about thoughts regarding cardiopulmonary resuscitation, artificial nutrition and hydration including use of feeding tubes and IVF, antibiotics, and further investigative studies such as blood draws and radiology. Son showed insight into medical condition. All questions answered. Writer recommended DNR/DNI and d/c to [facility]. Discussed hospice care when condition becomes more adavnced or goal is more comfort driven. Son will discuss afoirementioned with his brothers Abdifatah (2ry HCP) and Manish and asked writer to follow up tomorrow. Psychosocial support provided.

## 2021-03-16 NOTE — DISCHARGE NOTE NURSING/CASE MANAGEMENT/SOCIAL WORK - NSDCCRTYPESERV_GEN_ALL_CORE_FT
You are a resident of above assisted living facility ([facility]) and receive assistance from [facility] staff

## 2021-03-16 NOTE — CONSULT NOTE ADULT - SUBJECTIVE AND OBJECTIVE BOX
HPI:  Patient is an 85 yo male BIBA from [facility] assisted living with a PMH of HTN, Dyslipidemia, CKD stage 4, Parkinson's Disease, Cognitive Impairment, UC, glaucoma, and acoustic neuroma that presents after 3 falls within 24 hours. Of note patient is a poor historian with fluctuating levels of awareness. Patient was seen in the ED on 3/12 as well as twice on 3/13.  Last night patient states that he went to get up from his chair and took 2 steps before loosing his balance and falling to the ground hitting his head and elbows.  Patient walks with a walker at baseline as states he has done so for a "couple years."  Multiple inconsistencies in his story as he originally stated that his R should hurt but later on stated he was in no pain and was unsure if he hit his head.  (14 Mar 2021 04:13)    PERTINENT PM/SXH:   Parkinsons    UC (ulcerative colitis)    HTN (hypertension)    UC (ulcerative colitis)    HTN (hypertension)    Gout    Chronic kidney disease    Acoustic neuroma      No significant past surgical history    No significant past surgical history      FAMILY HISTORY:  No pertinent family history in first degree relatives      ITEMS NOT CHECKED ARE NOT PRESENT    SOCIAL HISTORY:   Significant other/partner[ ]  Children[ ]  Pentecostalism/Spirituality:  Substance hx:  [ ]   Tobacco hx:  [ ]   Alcohol hx: [ ]   Home Opioid hx:  [ ] I-Stop Reference No:  Living Situation: [ ]Home  [ ]Long term care  [ ]Rehab [ ]Other    ADVANCE DIRECTIVES:    DNR  MOLST  [ ]  Living Will  [ ]   DECISION MAKER(s):  [ ] Health Care Proxy(s)  [ ] Surrogate(s)  [ ] Guardian           Name(s): Phone Number(s):    BASELINE (I)ADL(s) (prior to admission):  [place]: [ ]Total  [ ] Moderate [ ]Dependent    Allergies    No Known Allergies    Intolerances    MEDICATIONS  (STANDING):  amLODIPine   Tablet 10 milliGRAM(s) Oral daily  aspirin enteric coated 81 milliGRAM(s) Oral daily  atorvastatin 40 milliGRAM(s) Oral at bedtime  BACItracin   Ointment 1 Application(s) Topical two times a day  brimonidine 0.2% Ophthalmic Solution 1 Drop(s) Both EYES two times a day  carbidopa/levodopa  25/100 1 Tablet(s) Oral three times a day  dextrose 5%. 1000 milliLiter(s) (100 mL/Hr) IV Continuous <Continuous>  heparin   Injectable 5000 Unit(s) SubCutaneous every 8 hours  melatonin 5 milliGRAM(s) Oral at bedtime  metoprolol tartrate 50 milliGRAM(s) Oral two times a day  mineral oil/petrolatum Hydrophilic Ointment 1 Application(s) Topical two times a day  multivitamin/minerals 1 Tablet(s) Oral daily  senna 2 Tablet(s) Oral at bedtime  timolol 0.5% Solution 1 Drop(s) Both EYES two times a day    MEDICATIONS  (PRN):  acetaminophen   Tablet .. 650 milliGRAM(s) Oral every 8 hours PRN Temp greater or equal to 38.5C (101.3F), Moderate Pain (4 - 6), Severe Pain (7 - 10)  artificial  tears Solution 1 Drop(s) Both EYES every 2 hours PRN Dry Eyes  magnesium hydroxide Suspension 30 milliLiter(s) Oral daily PRN Constipation  polyethylene glycol 3350 17 Gram(s) Oral daily PRN Constipation    PRESENT SYMPTOMS: [ ]Unable to obtain due to poor mentation   Source if other than patient:  [ ]Family   [ ]Team     Pain: [ ]yes [ ]no  QOL impact -   Location -                    Aggravating factors -  Quality -  Radiation -  Timing-  Severity (0-10 scale):  Minimal acceptable level (0-10 scale):     CPOT:    https://www.[facility].org/getattachment/[email or web address]/Critical-Care-Pain-Observation-Tool-(CPOT)      PAIN AD Score:     http://geriatrictoolkit.[place].[facility]/cog/painad.pdf (press ctrl +  left click to view)    Dyspnea:                           [ ]Mild [ ]Moderate [ ]Severe  Anxiety:                             [ ]Mild [ ]Moderate [ ]Severe  Fatigue:                             [ ]Mild [ ]Moderate [ ]Severe  Nausea:                             [ ]Mild [ ]Moderate [ ]Severe  Loss of appetite:              [ ]Mild [ ]Moderate [ ]Severe  Constipation:                    [ ]Mild [ ]Moderate [ ]Severe    Other Symptoms:  [ ]All other review of systems negative     Palliative Performance Status Version 2:         %    http://npcrc.org/files/news/palliative_performance_scale_ppsv2.pdf  PHYSICAL EXAM:  Vital Signs Last 24 Hrs  T(C): 36.7 (16 Mar 2021 05:14), Max: 37.6 (15 Mar 2021 10:46)  T(F): 98 (16 Mar 2021 05:14), Max: 99.7 (15 Mar 2021 10:46)  HR: 67 (16 Mar 2021 05:14) (58 - 84)  BP: 163/74 (16 Mar 2021 05:14) (121/57 - 165/71)  BP(mean): --  RR: 17 (16 Mar 2021 05:14) (17 - 18)  SpO2: 96% (16 Mar 2021 05:14) (90% - 96%) I&O's Summary    GENERAL:  [ ]Alert  [ ]Oriented x   [ ]Lethargic  [ ]Cachexia  [ ]Unarousable  [ ]Verbal  [ ]Non-Verbal  Behavioral:   [ ] Anxiety  [ ] Delirium [ ] Agitation [ ] Other  HEENT:  [ ]Normal   [ ]Dry mouth   [ ]ET Tube/Trach  [ ]Oral lesions  PULMONARY:   [ ]Clear [ ]Tachypnea  [ ]Audible excessive secretions   [ ]Rhonchi        [ ]Right [ ]Left [ ]Bilateral  [ ]Crackles        [ ]Right [ ]Left [ ]Bilateral  [ ]Wheezing     [ ]Right [ ]Left [ ]Bilateral  [ ]Diminished breath sounds [ ]right [ ]left [ ]bilateral  CARDIOVASCULAR:    [ ]Regular [ ]Irregular [ ]Tachy  [ ]Lalo [ ]Murmur [ ]Other  GASTROINTESTINAL:  [ ]Soft  [ ]Distended   [ ]+BS  [ ]Non tender [ ]Tender  [ ]PEG [ ]OGT/ NGT  Last BM:   GENITOURINARY:  [ ]Normal [ ] Incontinent   [ ]Oliguria/Anuria   [ ]Ruiz  MUSCULOSKELETAL:   [ ]Normal   [ ]Weakness  [ ]Bed/Wheelchair bound [ ]Edema  NEUROLOGIC:   [ ]No focal deficits  [ ]Cognitive impairment  [ ]Dysphagia [ ]Dysarthria [ ]Paresis [ ]Other   SKIN:   [ ]Normal    [ ]Rash  [ ]Pressure ulcer(s)       Present on admission [ ]y [ ]n    CRITICAL CARE:  [ ] Shock Present  [ ]Septic [ ]Cardiogenic [ ]Neurologic [ ]Hypovolemic  [ ]  Vasopressors [ ]  Inotropes   [ ]Respiratory failure present [ ]Mechanical ventilation [ ]Non-invasive ventilatory support [ ]High flow    [ ]Acute  [ ]Chronic [ ]Hypoxic  [ ]Hypercarbic [ ]Other  [ ]Other organ failure     LABS:                        11.5   9.55  )-----------( 232      ( 16 Mar 2021 08:07 )             36.0   03-16    150<H>  |  113<H>  |  50<H>  ----------------------------<  141<H>  4.3   |  23  |  2.49<H>    Ca    9.3      16 Mar 2021 08:07        Urinalysis Basic - ( 14 Mar 2021 10:56 )    Color: Yellow / Appearance: Clear / S.020 / pH: x  Gluc: x / Ketone: Trace  / Bili: Negative / Urobili: Negative mg/dL   Blood: x / Protein: 100 mg/dL / Nitrite: Negative   Leuk Esterase: Negative / RBC: 0-2 /HPF / WBC 0-2   Sq Epi: x / Non Sq Epi: Occasional / Bacteria: Occasional      RADIOLOGY & ADDITIONAL STUDIES: reviewed    PROTEIN CALORIE MALNUTRITION PRESENT: [ ]mild [ ]moderate [ ]severe [ ]underweight [ ]morbid obesity  https://www.andeal.org/vault/2440/web/files/ONC/Table_Clinical%20Characteristics%20to%20Document%20Malnutrition-White%20JV%20et%20al%2020.pdf    Height (cm): 177.8 (21 @ 21:10), 177.8 (21 @ 12:18), 177.8 (21 @ 10:49)  Weight (kg): 68 (21 @ 21:10), 71.2 (21 @ 12:18), 71.2 (21 @ 10:49)  BMI (kg/m2): 21.5 (21 @ 21:10), 22.5 (21 @ 12:18), 22.5 (21 @ 10:49)    [ ]PPSV2 < or = to 30% [ ]significant weight loss  [ ]poor nutritional intake  [ ]anasarca     Albumin, Serum: 3.4 g/dL (21 @ 22:08)   [ ]Artificial Nutrition      REFERRALS:   [ ]Chaplaincy  [ ]Hospice  [ ]Child Life  [ ]Social Work  [ ]Case management [ ]Holistic Therapy     Goals of Care Document:  HPI:  Patient is an 85 yo male BIBA from [place] assisted living with a PMH of HTN, Dyslipidemia, CKD stage 4, Parkinson's Disease, Cognitive Impairment, UC, glaucoma, and acoustic neuroma that presents after 3 falls within 24 hours. Of note patient is a poor historian with fluctuating levels of awareness. Patient was seen in the ED on 3/12 as well as twice on 3/13.  Last night patient states that he went to get up from his chair and took 2 steps before loosing his balance and falling to the ground hitting his head and elbows.  Patient walks with a walker at baseline as states he has done so for a "couple years."  Multiple inconsistencies in his story as he originally stated that his R should hurt but later on stated he was in no pain and was unsure if he hit his head.  (14 Mar 2021 04:13)    PERTINENT PM/SXH:   Parkinsons    UC (ulcerative colitis)    HTN (hypertension)    UC (ulcerative colitis)    HTN (hypertension)    Gout    Chronic kidney disease    Acoustic neuroma      No significant past surgical history    No significant past surgical history      FAMILY HISTORY:  No pertinent family history in first degree relatives      ITEMS NOT CHECKED ARE NOT PRESENT    SOCIAL HISTORY:   Significant other/partner[ ]  Children[x ]  Jewish/Spirituality:  Substance hx:  [ ]   Tobacco hx:  [ ]   Alcohol hx: [ ]   Home Opioid hx:  [ ] I-Stop Reference No:  Living Situation: [x ]Home  [ ]Long term care  [ ]Rehab [ ]Other     ADVANCE DIRECTIVES:    DNR  MOLST  [ ]  Living Will  [ ]   DECISION MAKER(s):  [ x] Health Care Proxy(s)  [ ] Surrogate(s)  [ ] Guardian           Name(s):   Phone Number(s):    BASELINE (I)ADL(s) (prior to admission):  [place]: [ ]Total  [ ] Moderate [x ]Dependent    Allergies    No Known Allergies    Intolerances    MEDICATIONS  (STANDING):  amLODIPine   Tablet 10 milliGRAM(s) Oral daily  aspirin enteric coated 81 milliGRAM(s) Oral daily  atorvastatin 40 milliGRAM(s) Oral at bedtime  BACItracin   Ointment 1 Application(s) Topical two times a day  brimonidine 0.2% Ophthalmic Solution 1 Drop(s) Both EYES two times a day  carbidopa/levodopa  25/100 1 Tablet(s) Oral three times a day  dextrose 5%. 1000 milliLiter(s) (100 mL/Hr) IV Continuous <Continuous>  heparin   Injectable 5000 Unit(s) SubCutaneous every 8 hours  melatonin 5 milliGRAM(s) Oral at bedtime  metoprolol tartrate 50 milliGRAM(s) Oral two times a day  mineral oil/petrolatum Hydrophilic Ointment 1 Application(s) Topical two times a day  multivitamin/minerals 1 Tablet(s) Oral daily  senna 2 Tablet(s) Oral at bedtime  timolol 0.5% Solution 1 Drop(s) Both EYES two times a day    MEDICATIONS  (PRN):  acetaminophen   Tablet .. 650 milliGRAM(s) Oral every 8 hours PRN Temp greater or equal to 38.5C (101.3F), Moderate Pain (4 - 6), Severe Pain (7 - 10)  artificial  tears Solution 1 Drop(s) Both EYES every 2 hours PRN Dry Eyes  magnesium hydroxide Suspension 30 milliLiter(s) Oral daily PRN Constipation  polyethylene glycol 3350 17 Gram(s) Oral daily PRN Constipation    PRESENT SYMPTOMS: [ x]Unable to obtain due to poor mentation   Source if other than patient:  [ ]Family   [ ]Team     Pain: [ ]yes [ ]no  QOL impact -   Location -                    Aggravating factors -  Quality -  Radiation -  Timing-  Severity (0-10 scale):  Minimal acceptable level (0-10 scale):     CPOT:    https://www.[facility].org/getattachment/[email or web address]/Critical-Care-Pain-Observation-Tool-(CPOT)      PAIN AD Score:     http://geriatrictoolkit.[place].[facility]/cog/painad.pdf (press ctrl +  left click to view)    Dyspnea:                           [ ]Mild [ ]Moderate [ ]Severe  Anxiety:                             [ ]Mild [ ]Moderate [ ]Severe  Fatigue:                             [ ]Mild [ ]Moderate [ ]Severe  Nausea:                             [ ]Mild [ ]Moderate [ ]Severe  Loss of appetite:              [ ]Mild [ ]Moderate [ ]Severe  Constipation:                    [ ]Mild [ ]Moderate [ ]Severe    Other Symptoms:  [ ]All other review of systems negative     Palliative Performance Status Version 2:     50  %    http://[facility].org/files/news/palliative_performance_scale_ppsv2.pdf  PHYSICAL EXAM:  Vital Signs Last 24 Hrs  T(C): 36.7 (16 Mar 2021 05:14), Max: 37.6 (15 Mar 2021 10:46)  T(F): 98 (16 Mar 2021 05:14), Max: 99.7 (15 Mar 2021 10:46)  HR: 67 (16 Mar 2021 05:14) (58 - 84)  BP: 163/74 (16 Mar 2021 05:14) (121/57 - 165/71)  BP(mean): --  RR: 17 (16 Mar 2021 05:14) (17 - 18)  SpO2: 96% (16 Mar 2021 05:14) (90% - 96%) I&O's Summary    GENERAL: NAD, well-groomed, well-developed  HEAD:  Atraumatic, Normocephalic  ENMT: Moist mucous membranes,   NECK: Supple, No JVD, Normal thyroid  NERVOUS SYSTEM:  AAOx1, All 4 extremities mobile, no gross sensory deficits.   CHEST/LUNG: Clear to auscultation bilaterally; No rales, rhonchi, wheezing, or rubs  HEART: Regular rate and rhythm; No murmurs, rubs, or gallops  ABDOMEN: Soft, Nontender, Nondistended; Bowel sounds present  EXTREMITIES:  2+ Peripheral Pulses, No clubbing, cyanosis, or edema    LABS:                        11.5   9.55  )-----------( 232      ( 16 Mar 2021 08:07 )             36.0   03-16    150<H>  |  113<H>  |  50<H>  ----------------------------<  141<H>  4.3   |  23  |  2.49<H>    Ca    9.3      16 Mar 2021 08:07        Urinalysis Basic - ( 14 Mar 2021 10:56 )    Color: Yellow / Appearance: Clear / S.020 / pH: x  Gluc: x / Ketone: Trace  / Bili: Negative / Urobili: Negative mg/dL   Blood: x / Protein: 100 mg/dL / Nitrite: Negative   Leuk Esterase: Negative / RBC: 0-2 /HPF / WBC 0-2   Sq Epi: x / Non Sq Epi: Occasional / Bacteria: Occasional      RADIOLOGY & ADDITIONAL STUDIES: reviewed    PROTEIN CALORIE MALNUTRITION PRESENT: [ ]mild [ ]moderate [ ]severe [ ]underweight [ ]morbid obesity  https://www.andeal.org/vault/2440/web/files/ONC/Table_Clinical%20Characteristics%20to%20Document%20Malnutrition-White%20JV%20et%20al%2020.pdf    Height (cm): 177.8 (21 @ 21:10), 177.8 (21 @ 12:18), 177.8 (21 @ 10:49)  Weight (kg): 68 (21 @ 21:10), 71.2 (21 @ 12:18), 71.2 (21 @ 10:49)  BMI (kg/m2): 21.5 (21 @ 21:10), 22.5 (21 @ 12:18), 22.5 (21 @ 10:49)    [ ]PPSV2 < or = to 30% [ ]significant weight loss  [ ]poor nutritional intake  [ ]anasarca     Albumin, Serum: 3.4 g/dL (21 @ 22:08)   [ ]Artificial Nutrition      REFERRALS:   [ ]Chaplaincy  [ ]Hospice  [ ]Child Life  [ ]Social Work  [ ]Case management [ ]Holistic Therapy     Goals of Care Document:

## 2021-03-17 LAB
ANION GAP SERPL CALC-SCNC: 11 MMOL/L — SIGNIFICANT CHANGE UP (ref 5–17)
BUN SERPL-MCNC: 45 MG/DL — HIGH (ref 7–23)
CALCIUM SERPL-MCNC: 9 MG/DL — SIGNIFICANT CHANGE UP (ref 8.4–10.5)
CHLORIDE SERPL-SCNC: 108 MMOL/L — SIGNIFICANT CHANGE UP (ref 96–108)
CO2 SERPL-SCNC: 22 MMOL/L — SIGNIFICANT CHANGE UP (ref 22–31)
CREAT SERPL-MCNC: 2.25 MG/DL — HIGH (ref 0.5–1.3)
GLUCOSE SERPL-MCNC: 167 MG/DL — HIGH (ref 70–99)
HCT VFR BLD CALC: 33.5 % — LOW (ref 39–50)
HGB BLD-MCNC: 10.6 G/DL — LOW (ref 13–17)
MCHC RBC-ENTMCNC: 29.7 PG — SIGNIFICANT CHANGE UP (ref 27–34)
MCHC RBC-ENTMCNC: 31.6 GM/DL — LOW (ref 32–36)
MCV RBC AUTO: 93.8 FL — SIGNIFICANT CHANGE UP (ref 80–100)
NRBC # BLD: 0 /100 WBCS — SIGNIFICANT CHANGE UP (ref 0–0)
PLATELET # BLD AUTO: 206 K/UL — SIGNIFICANT CHANGE UP (ref 150–400)
POTASSIUM SERPL-MCNC: 4 MMOL/L — SIGNIFICANT CHANGE UP (ref 3.5–5.3)
POTASSIUM SERPL-SCNC: 4 MMOL/L — SIGNIFICANT CHANGE UP (ref 3.5–5.3)
RBC # BLD: 3.57 M/UL — LOW (ref 4.2–5.8)
RBC # FLD: 13.2 % — SIGNIFICANT CHANGE UP (ref 10.3–14.5)
SARS-COV-2 RNA SPEC QL NAA+PROBE: SIGNIFICANT CHANGE UP
SODIUM SERPL-SCNC: 141 MMOL/L — SIGNIFICANT CHANGE UP (ref 135–145)
WBC # BLD: 7.17 K/UL — SIGNIFICANT CHANGE UP (ref 3.8–10.5)
WBC # FLD AUTO: 7.17 K/UL — SIGNIFICANT CHANGE UP (ref 3.8–10.5)

## 2021-03-17 PROCEDURE — 12345: CPT | Mod: NC

## 2021-03-17 PROCEDURE — 99232 SBSQ HOSP IP/OBS MODERATE 35: CPT

## 2021-03-17 RX ADMIN — Medication 50 MILLIGRAM(S): at 17:14

## 2021-03-17 RX ADMIN — Medication 1 TABLET(S): at 12:29

## 2021-03-17 RX ADMIN — HEPARIN SODIUM 5000 UNIT(S): 5000 INJECTION INTRAVENOUS; SUBCUTANEOUS at 21:52

## 2021-03-17 RX ADMIN — SENNA PLUS 2 TABLET(S): 8.6 TABLET ORAL at 21:52

## 2021-03-17 RX ADMIN — AMLODIPINE BESYLATE 10 MILLIGRAM(S): 2.5 TABLET ORAL at 06:21

## 2021-03-17 RX ADMIN — BRIMONIDINE TARTRATE 1 DROP(S): 2 SOLUTION/ DROPS OPHTHALMIC at 06:23

## 2021-03-17 RX ADMIN — BRIMONIDINE TARTRATE 1 DROP(S): 2 SOLUTION/ DROPS OPHTHALMIC at 17:14

## 2021-03-17 RX ADMIN — Medication 1 APPLICATION(S): at 17:15

## 2021-03-17 RX ADMIN — CARBIDOPA AND LEVODOPA 1 TABLET(S): 25; 100 TABLET ORAL at 21:52

## 2021-03-17 RX ADMIN — Medication 1 APPLICATION(S): at 06:24

## 2021-03-17 RX ADMIN — CARBIDOPA AND LEVODOPA 1 TABLET(S): 25; 100 TABLET ORAL at 14:06

## 2021-03-17 RX ADMIN — Medication 1 DROP(S): at 17:15

## 2021-03-17 RX ADMIN — Medication 81 MILLIGRAM(S): at 12:29

## 2021-03-17 RX ADMIN — HEPARIN SODIUM 5000 UNIT(S): 5000 INJECTION INTRAVENOUS; SUBCUTANEOUS at 14:08

## 2021-03-17 RX ADMIN — Medication 50 MILLIGRAM(S): at 06:22

## 2021-03-17 RX ADMIN — Medication 1 APPLICATION(S): at 17:14

## 2021-03-17 RX ADMIN — ATORVASTATIN CALCIUM 40 MILLIGRAM(S): 80 TABLET, FILM COATED ORAL at 21:53

## 2021-03-17 RX ADMIN — Medication 1 APPLICATION(S): at 06:21

## 2021-03-17 RX ADMIN — CARBIDOPA AND LEVODOPA 1 TABLET(S): 25; 100 TABLET ORAL at 06:22

## 2021-03-17 RX ADMIN — HEPARIN SODIUM 5000 UNIT(S): 5000 INJECTION INTRAVENOUS; SUBCUTANEOUS at 06:22

## 2021-03-17 RX ADMIN — Medication 1 DROP(S): at 06:22

## 2021-03-17 RX ADMIN — Medication 5 MILLIGRAM(S): at 21:52

## 2021-03-17 NOTE — PROGRESS NOTE ADULT - ASSESSMENT
86M with Parkinsons, Ulcerative Colitis, HTN, HLD and CKD 4 admitted for increase in falls at the Assisted Living Facility ([facility]).     Increased in Falls   Appears to be from worsening of Parkinson and Gait related issues   Had EP evaluation for bradycardia 2019 and was discontinued off B. Blockers   Telemetry shows no events overnight   Imaging reviewed; Will get Neurology consultation  PT consulted and will most likely need either PT inpatient vs outpatient    Hypernatremia  Resolved and patient needs full assistance to eat and drink  Will stop IV Fluids     CKD 4  Baseline Creatinine appears to be around 2.4  Making Urine and monitor BMP and electrolytes     HTN / HLD   Amlodipine  Atorvastatin    Parkinson Disease  Sinimet TID   Neurology to be consulted     UC  Not on any meds    Diet  Regular    DVT Prophylaxis  Heparin SC     Disposition  Full Code/Inpatient  Palliative Consult in process   Discharge to Rehab pending

## 2021-03-17 NOTE — CHART NOTE - NSCHARTNOTEFT_GEN_A_CORE
Assessment:   Pt is an 87 yo male BIBA from [facility] assisted living with a PMH of HTN, Dyslipidemia, CKD stage 4, Parkinson's Disease, Cognitive Impairment, UC, glaucoma, and acoustic neuroma that presents after 3 falls within 24 hours. Multiple abrasions on head and all extremities. Dressings places on bilateral arms and legs.  Bilateral stasis dermatitis.  H/H dropped, BUN/Creat trending down. Intact variable: Pt ate well yesterday but only about 25% this morning. As per initial nutrition assessment, Meets criteria for non severe (moderate) malnutrition in the context of acute illness: muscle loss wasting of temples mild, wasting of orbital muscle mass mild, buccal fat mild and energy intake < 75% of estimated energy requirement for > 7 days. Furthermore, pt with noted weight oss -4.0# since admission (significant 2.6%) Given variable intake/skin breakdown, recommend supplement ensure clear  (low K, phosphorus)          Factors impacting intake: [ ] none [ ] nausea  [ ] vomiting [ ] diarrhea [ ] constipation  [ ]chewing problems [ ] swallowing issues  [x ] other: cognition    Diet Presciption: Diet, Regular:   DASH/TLC {Sodium & Cholesterol Restricted}  No Concentrated Potassium  No Concentrated Phosphorus (03-14-21 @ 05:57)    Intake: variable: >50% yesterday, only around 25% this morning    Current Weight: Weight (kg): 68 (03-13 @ 21:10), 71.2 (03-13 @ 12:18), 71.2 (03-12 @ 10:49)  % Weight Change   initial 149# current 145# (2.6% x 4 days)    Pertinent Medications: MEDICATIONS  (STANDING):  amLODIPine   Tablet 10 milliGRAM(s) Oral daily  aspirin enteric coated 81 milliGRAM(s) Oral daily  atorvastatin 40 milliGRAM(s) Oral at bedtime  BACItracin   Ointment 1 Application(s) Topical two times a day  brimonidine 0.2% Ophthalmic Solution 1 Drop(s) Both EYES two times a day  carbidopa/levodopa  25/100 1 Tablet(s) Oral three times a day  heparin   Injectable 5000 Unit(s) SubCutaneous every 8 hours  melatonin 5 milliGRAM(s) Oral at bedtime  metoprolol tartrate 50 milliGRAM(s) Oral two times a day  mineral oil/petrolatum Hydrophilic Ointment 1 Application(s) Topical two times a day  multivitamin/minerals 1 Tablet(s) Oral daily  senna 2 Tablet(s) Oral at bedtime  timolol 0.5% Solution 1 Drop(s) Both EYES two times a day    MEDICATIONS  (PRN):  acetaminophen   Tablet .. 650 milliGRAM(s) Oral every 8 hours PRN Temp greater or equal to 38.5C (101.3F), Moderate Pain (4 - 6), Severe Pain (7 - 10)  artificial  tears Solution 1 Drop(s) Both EYES every 2 hours PRN Dry Eyes  magnesium hydroxide Suspension 30 milliLiter(s) Oral daily PRN Constipation  polyethylene glycol 3350 17 Gram(s) Oral daily PRN Constipation    Pertinent Labs: 03-17 Na141 mmol/L Glu 167 mg/dL<H> K+ 4.0 mmol/L Cr  2.25 mg/dL<H> BUN 45 mg/dL<H> 03-13 Alb 3.4 g/dL     CAPILLARY BLOOD GLUCOSE        Skin:  Healing ulceration to R posterior lower leg.   L ankle edema +1    Estimated Needs:   [x ] no change since previous assessment  [ ] recalculated:     Previous Nutrition Diagnosis:   [ ] Inadequate Energy Intake [ ]Inadequate Oral Intake [ ] Excessive Energy Intake   [ ] Underweight [ ] Increased Nutrient Needs [ ] Overweight/Obesity   [ ] Altered GI Function [ ] Unintended Weight Loss [ ] Food & Nutrition Related Knowledge Deficit [x ] Malnutrition     Nutrition Diagnosis is [x ] ongoing  [ ] resolved [ ] not applicable     New Nutrition Diagnosis: [ ] not applicable       Interventions:   Recommend  [ ] Change Diet To:  [x ] Nutrition Supplement   ensure clear  [ ] Nutrition Support  [ ] Other:     Monitoring and Evaluation:   [x ] PO intake [ x ] Tolerance to diet prescription [ x ] weights [ x ] labs[ x ] follow up per protocol  [ ] other: Assessment:   Pt is an 85 yo male BIBA from [facility] assisted living with a PMH of HTN, Dyslipidemia, CKD stage 4, Parkinson's Disease, Cognitive Impairment, UC, glaucoma, and acoustic neuroma that presents after 3 falls within 24 hours. Multiple abrasions on head and all extremities. Dressings places on bilateral arms and legs.  Bilateral stasis dermatitis.  H/H dropped, BUN/Creat trending down. Intake variable: Pt ate well yesterday but only about 25% this morning. As per initial nutrition assessment, Meets criteria for non severe (moderate) malnutrition in the context of acute illness: muscle loss wasting of temples mild, wasting of orbital muscle mass mild, buccal fat mild and energy intake < 75% of estimated energy requirement for > 7 days. Furthermore, pt with noted weight loss -4.0# since admission (significant 2.6%) Given variable intake/skin breakdown, recommend supplement ensure clear  (low K, phosphorus)          Factors impacting intake: [ ] none [ ] nausea  [ ] vomiting [ ] diarrhea [ ] constipation  [ ]chewing problems [ ] swallowing issues  [x ] other: cognition    Diet Presciption: Diet, Regular:   DASH/TLC {Sodium & Cholesterol Restricted}  No Concentrated Potassium  No Concentrated Phosphorus (03-14-21 @ 05:57)    Intake: variable: >50% yesterday, only around 25% this morning    Current Weight: Weight (kg): 68 (03-13 @ 21:10), 71.2 (03-13 @ 12:18), 71.2 (03-12 @ 10:49)  % Weight Change   initial 149# current 145# (2.6% x 4 days)    Pertinent Medications: MEDICATIONS  (STANDING):  amLODIPine   Tablet 10 milliGRAM(s) Oral daily  aspirin enteric coated 81 milliGRAM(s) Oral daily  atorvastatin 40 milliGRAM(s) Oral at bedtime  BACItracin   Ointment 1 Application(s) Topical two times a day  brimonidine 0.2% Ophthalmic Solution 1 Drop(s) Both EYES two times a day  carbidopa/levodopa  25/100 1 Tablet(s) Oral three times a day  heparin   Injectable 5000 Unit(s) SubCutaneous every 8 hours  melatonin 5 milliGRAM(s) Oral at bedtime  metoprolol tartrate 50 milliGRAM(s) Oral two times a day  mineral oil/petrolatum Hydrophilic Ointment 1 Application(s) Topical two times a day  multivitamin/minerals 1 Tablet(s) Oral daily  senna 2 Tablet(s) Oral at bedtime  timolol 0.5% Solution 1 Drop(s) Both EYES two times a day    MEDICATIONS  (PRN):  acetaminophen   Tablet .. 650 milliGRAM(s) Oral every 8 hours PRN Temp greater or equal to 38.5C (101.3F), Moderate Pain (4 - 6), Severe Pain (7 - 10)  artificial  tears Solution 1 Drop(s) Both EYES every 2 hours PRN Dry Eyes  magnesium hydroxide Suspension 30 milliLiter(s) Oral daily PRN Constipation  polyethylene glycol 3350 17 Gram(s) Oral daily PRN Constipation    Pertinent Labs: 03-17 Na141 mmol/L Glu 167 mg/dL<H> K+ 4.0 mmol/L Cr  2.25 mg/dL<H> BUN 45 mg/dL<H> 03-13 Alb 3.4 g/dL     CAPILLARY BLOOD GLUCOSE        Skin:  Healing ulceration to R posterior lower leg.   L ankle edema +1    Estimated Needs:   [x ] no change since previous assessment  [ ] recalculated:     Previous Nutrition Diagnosis:   [ ] Inadequate Energy Intake [ ]Inadequate Oral Intake [ ] Excessive Energy Intake   [ ] Underweight [ ] Increased Nutrient Needs [ ] Overweight/Obesity   [ ] Altered GI Function [ ] Unintended Weight Loss [ ] Food & Nutrition Related Knowledge Deficit [x ] Malnutrition     Nutrition Diagnosis is [x ] ongoing  [ ] resolved [ ] not applicable     New Nutrition Diagnosis: [ ] not applicable       Interventions:   Recommend  [ ] Change Diet To:  [x ] Nutrition Supplement   ensure clear  [ ] Nutrition Support  [ ] Other:     Monitoring and Evaluation:   [x ] PO intake [ x ] Tolerance to diet prescription [ x ] weights [ x ] labs[ x ] follow up per protocol  [ ] other: Assessment:   Pt is an 87 yo male BIBA from [facility] assisted living with a PMH of HTN, Dyslipidemia, CKD stage 4, Parkinson's Disease, Cognitive Impairment, UC, glaucoma, and acoustic neuroma that presents after 3 falls within 24 hours. Multiple abrasions on head and all extremities. Dressings places on bilateral arms and legs.  Bilateral stasis dermatitis.  H/H dropped, BUN/Creat trending down. Intake variable: Pt ate well yesterday but only about 25% this morning. As per initial nutrition assessment, Meets criteria for non severe (moderate) malnutrition in the context of acute illness: muscle loss wasting of temples mild, wasting of orbital muscle mass mild, buccal fat mild and energy intake < 75% of estimated energy requirement for > 7 days. Noted trace ketones on admission 3/14 as well as hypernatremia 3/16 and 3/17. Furthermore, pt with noted weight loss -4.0# since admission (significant 2.6%) Given variable intake/skin breakdown, recommend supplement ensure clear  (low K, phosphorus)          Factors impacting intake: [ ] none [ ] nausea  [ ] vomiting [ ] diarrhea [ ] constipation  [ ]chewing problems [ ] swallowing issues  [x ] other: cognition    Diet Presciption: Diet, Regular:   DASH/TLC {Sodium & Cholesterol Restricted}  No Concentrated Potassium  No Concentrated Phosphorus (03-14-21 @ 05:57)    Intake: variable: >50% yesterday, only around 25% this morning    Current Weight: Weight (kg): 68 (03-13 @ 21:10), 71.2 (03-13 @ 12:18), 71.2 (03-12 @ 10:49)  % Weight Change   initial 149# current 145# (2.6% x 4 days)    Pertinent Medications: MEDICATIONS  (STANDING):  amLODIPine   Tablet 10 milliGRAM(s) Oral daily  aspirin enteric coated 81 milliGRAM(s) Oral daily  atorvastatin 40 milliGRAM(s) Oral at bedtime  BACItracin   Ointment 1 Application(s) Topical two times a day  brimonidine 0.2% Ophthalmic Solution 1 Drop(s) Both EYES two times a day  carbidopa/levodopa  25/100 1 Tablet(s) Oral three times a day  heparin   Injectable 5000 Unit(s) SubCutaneous every 8 hours  melatonin 5 milliGRAM(s) Oral at bedtime  metoprolol tartrate 50 milliGRAM(s) Oral two times a day  mineral oil/petrolatum Hydrophilic Ointment 1 Application(s) Topical two times a day  multivitamin/minerals 1 Tablet(s) Oral daily  senna 2 Tablet(s) Oral at bedtime  timolol 0.5% Solution 1 Drop(s) Both EYES two times a day    MEDICATIONS  (PRN):  acetaminophen   Tablet .. 650 milliGRAM(s) Oral every 8 hours PRN Temp greater or equal to 38.5C (101.3F), Moderate Pain (4 - 6), Severe Pain (7 - 10)  artificial  tears Solution 1 Drop(s) Both EYES every 2 hours PRN Dry Eyes  magnesium hydroxide Suspension 30 milliLiter(s) Oral daily PRN Constipation  polyethylene glycol 3350 17 Gram(s) Oral daily PRN Constipation    Pertinent Labs: 03-17 Na141 mmol/L Glu 167 mg/dL<H> K+ 4.0 mmol/L Cr  2.25 mg/dL<H> BUN 45 mg/dL<H> 03-13 Alb 3.4 g/dL     CAPILLARY BLOOD GLUCOSE        Skin:  Healing ulceration to R posterior lower leg.   L ankle edema +1    Estimated Needs:   [x ] no change since previous assessment  [ ] recalculated:     Previous Nutrition Diagnosis:   [ ] Inadequate Energy Intake [ ]Inadequate Oral Intake [ ] Excessive Energy Intake   [ ] Underweight [ ] Increased Nutrient Needs [ ] Overweight/Obesity   [ ] Altered GI Function [ ] Unintended Weight Loss [ ] Food & Nutrition Related Knowledge Deficit [x ] Malnutrition     Nutrition Diagnosis is [x ] ongoing  [ ] resolved [ ] not applicable     New Nutrition Diagnosis: [ ] not applicable       Interventions:   Recommend  [ ] Change Diet To:  [x ] Nutrition Supplement   ensure clear  [ ] Nutrition Support  [ ] Other:     Monitoring and Evaluation:   [x ] PO intake [ x ] Tolerance to diet prescription [ x ] weights [ x ] labs[ x ] follow up per protocol  [ ] other:

## 2021-03-17 NOTE — CONSULT NOTE ADULT - SUBJECTIVE AND OBJECTIVE BOX
Patient is a 86y old  Male who presents with a chief complaint of Recurrent Falls 15 Mar 2021: He has a PMHX of HTN, Dyslipidemia, CKD stage 4, Parkinson's Disease, Cognitive Impairment, UC, glaucoma, and acoustic neuroma that presented on 3/13 after 3 falls within 24 hours.   Siobhan

## 2021-03-17 NOTE — PROGRESS NOTE ADULT - NUTRITIONAL ASSESSMENT
This patient has been assessed with a concern for Malnutrition and has been determined to have a diagnosis/diagnoses of Moderate protein-calorie malnutrition.    This patient is being managed with:   Diet Regular-  DASH/TLC {Sodium & Cholesterol Restricted}  No Concentrated Potassium  No Concentrated Phosphorus  Entered: Mar 14 2021  5:52AM    

## 2021-03-17 NOTE — CONSULT NOTE ADULT - ASSESSMENT
1. Bilateral upper extremity skin tears: Current orders to use bacitracin BID.    2. Posterior right lower extremity growth: Stable no drainage, no warmth, no erythema, periwound intact: Recommend to make a dermatology appointment.

## 2021-03-17 NOTE — PROGRESS NOTE ADULT - SUBJECTIVE AND OBJECTIVE BOX
Subjective: Patient seen and examined. Doing well with no overnight events.     MEDICATIONS  (STANDING):  amLODIPine   Tablet 10 milliGRAM(s) Oral daily  aspirin enteric coated 81 milliGRAM(s) Oral daily  atorvastatin 40 milliGRAM(s) Oral at bedtime  BACItracin   Ointment 1 Application(s) Topical two times a day  brimonidine 0.2% Ophthalmic Solution 1 Drop(s) Both EYES two times a day  carbidopa/levodopa  25/100 1 Tablet(s) Oral three times a day  heparin   Injectable 5000 Unit(s) SubCutaneous every 8 hours  melatonin 5 milliGRAM(s) Oral at bedtime  metoprolol tartrate 50 milliGRAM(s) Oral two times a day  mineral oil/petrolatum Hydrophilic Ointment 1 Application(s) Topical two times a day  multivitamin/minerals 1 Tablet(s) Oral daily  senna 2 Tablet(s) Oral at bedtime  timolol 0.5% Solution 1 Drop(s) Both EYES two times a day    MEDICATIONS  (PRN):  acetaminophen   Tablet .. 650 milliGRAM(s) Oral every 8 hours PRN Temp greater or equal to 38.5C (101.3F), Moderate Pain (4 - 6), Severe Pain (7 - 10)  artificial  tears Solution 1 Drop(s) Both EYES every 2 hours PRN Dry Eyes  magnesium hydroxide Suspension 30 milliLiter(s) Oral daily PRN Constipation  polyethylene glycol 3350 17 Gram(s) Oral daily PRN Constipation      Allergies    No Known Allergies    Intolerances        Vital Signs Last 24 Hrs  T(C): 36.9 (17 Mar 2021 10:46), Max: 37 (16 Mar 2021 14:45)  T(F): 98.4 (17 Mar 2021 10:46), Max: 98.6 (16 Mar 2021 14:45)  HR: 52 (17 Mar 2021 10:46) (52 - 68)  BP: 135/69 (17 Mar 2021 10:46) (125/71 - 189/74)  BP(mean): --  RR: 17 (17 Mar 2021 10:46) (16 - 18)  SpO2: 97% (17 Mar 2021 10:46) (96% - 97%)    PHYSICAL EXAM:  GENERAL: NAD, well-groomed, well-developed  HEAD:  Atraumatic, Normocephalic  ENMT: Moist mucous membranes,   NECK: Supple, No JVD, Normal thyroid  NERVOUS SYSTEM:  All 4 extremities mobile, no gross sensory deficits.   CHEST/LUNG: Clear to auscultation bilaterally; No rales, rhonchi, wheezing, or rubs  HEART: Regular rate and rhythm; No murmurs, rubs, or gallops  ABDOMEN: Soft, Nontender, Nondistended; Bowel sounds present  EXTREMITIES:  2+ Peripheral Pulses, No clubbing, cyanosis, or edema      LABS:                        10.6   7.17  )-----------( 206      ( 17 Mar 2021 07:29 )             33.5     17 Mar 2021 07:29    141    |  108    |  45     ----------------------------<  167    4.0     |  22     |  2.25     Ca    9.0        17 Mar 2021 07:29          CAPILLARY BLOOD GLUCOSE          RADIOLOGY & ADDITIONAL TESTS:    Imaging Personally Reviewed:  [ ] YES     Consultant(s) Notes Reviewed:      Care Discussed with Consultants/Other Providers:    Advanced Directives: [ ] DNR  [ ] No feeding tube  [ ] MOLST in chart  [ ] MOLST completed today  [ ] Unknown

## 2021-03-18 VITALS
DIASTOLIC BLOOD PRESSURE: 57 MMHG | SYSTOLIC BLOOD PRESSURE: 101 MMHG | TEMPERATURE: 99 F | RESPIRATION RATE: 17 BRPM | HEART RATE: 57 BPM | OXYGEN SATURATION: 96 %

## 2021-03-18 LAB
ANION GAP SERPL CALC-SCNC: 10 MMOL/L — SIGNIFICANT CHANGE UP (ref 5–17)
BUN SERPL-MCNC: 45 MG/DL — HIGH (ref 7–23)
CALCIUM SERPL-MCNC: 9.2 MG/DL — SIGNIFICANT CHANGE UP (ref 8.4–10.5)
CHLORIDE SERPL-SCNC: 105 MMOL/L — SIGNIFICANT CHANGE UP (ref 96–108)
CO2 SERPL-SCNC: 25 MMOL/L — SIGNIFICANT CHANGE UP (ref 22–31)
CREAT SERPL-MCNC: 2.17 MG/DL — HIGH (ref 0.5–1.3)
GLUCOSE SERPL-MCNC: 133 MG/DL — HIGH (ref 70–99)
HCT VFR BLD CALC: 34.3 % — LOW (ref 39–50)
HGB BLD-MCNC: 10.9 G/DL — LOW (ref 13–17)
MCHC RBC-ENTMCNC: 29.8 PG — SIGNIFICANT CHANGE UP (ref 27–34)
MCHC RBC-ENTMCNC: 31.8 GM/DL — LOW (ref 32–36)
MCV RBC AUTO: 93.7 FL — SIGNIFICANT CHANGE UP (ref 80–100)
NRBC # BLD: 0 /100 WBCS — SIGNIFICANT CHANGE UP (ref 0–0)
PLATELET # BLD AUTO: 213 K/UL — SIGNIFICANT CHANGE UP (ref 150–400)
POTASSIUM SERPL-MCNC: 4.1 MMOL/L — SIGNIFICANT CHANGE UP (ref 3.5–5.3)
POTASSIUM SERPL-SCNC: 4.1 MMOL/L — SIGNIFICANT CHANGE UP (ref 3.5–5.3)
RBC # BLD: 3.66 M/UL — LOW (ref 4.2–5.8)
RBC # FLD: 13.2 % — SIGNIFICANT CHANGE UP (ref 10.3–14.5)
SODIUM SERPL-SCNC: 140 MMOL/L — SIGNIFICANT CHANGE UP (ref 135–145)
WBC # BLD: 6.52 K/UL — SIGNIFICANT CHANGE UP (ref 3.8–10.5)
WBC # FLD AUTO: 6.52 K/UL — SIGNIFICANT CHANGE UP (ref 3.8–10.5)

## 2021-03-18 PROCEDURE — 99285 EMERGENCY DEPT VISIT HI MDM: CPT

## 2021-03-18 PROCEDURE — 85027 COMPLETE CBC AUTOMATED: CPT

## 2021-03-18 PROCEDURE — 90471 IMMUNIZATION ADMIN: CPT

## 2021-03-18 PROCEDURE — 73130 X-RAY EXAM OF HAND: CPT

## 2021-03-18 PROCEDURE — 12345: CPT | Mod: NC

## 2021-03-18 PROCEDURE — 73080 X-RAY EXAM OF ELBOW: CPT

## 2021-03-18 PROCEDURE — 73030 X-RAY EXAM OF SHOULDER: CPT

## 2021-03-18 PROCEDURE — U0005: CPT

## 2021-03-18 PROCEDURE — 93005 ELECTROCARDIOGRAM TRACING: CPT

## 2021-03-18 PROCEDURE — 85610 PROTHROMBIN TIME: CPT

## 2021-03-18 PROCEDURE — 71045 X-RAY EXAM CHEST 1 VIEW: CPT

## 2021-03-18 PROCEDURE — 80048 BASIC METABOLIC PNL TOTAL CA: CPT

## 2021-03-18 PROCEDURE — 36415 COLL VENOUS BLD VENIPUNCTURE: CPT

## 2021-03-18 PROCEDURE — 70450 CT HEAD/BRAIN W/O DYE: CPT

## 2021-03-18 PROCEDURE — 72170 X-RAY EXAM OF PELVIS: CPT

## 2021-03-18 PROCEDURE — 81001 URINALYSIS AUTO W/SCOPE: CPT

## 2021-03-18 PROCEDURE — U0003: CPT

## 2021-03-18 PROCEDURE — 90714 TD VACC NO PRESV 7 YRS+ IM: CPT

## 2021-03-18 PROCEDURE — 80053 COMPREHEN METABOLIC PANEL: CPT

## 2021-03-18 PROCEDURE — 86769 SARS-COV-2 COVID-19 ANTIBODY: CPT

## 2021-03-18 PROCEDURE — 99239 HOSP IP/OBS DSCHRG MGMT >30: CPT

## 2021-03-18 PROCEDURE — 97116 GAIT TRAINING THERAPY: CPT

## 2021-03-18 PROCEDURE — 97110 THERAPEUTIC EXERCISES: CPT

## 2021-03-18 PROCEDURE — 97530 THERAPEUTIC ACTIVITIES: CPT

## 2021-03-18 PROCEDURE — 72125 CT NECK SPINE W/O DYE: CPT

## 2021-03-18 PROCEDURE — 85730 THROMBOPLASTIN TIME PARTIAL: CPT

## 2021-03-18 PROCEDURE — 97161 PT EVAL LOW COMPLEX 20 MIN: CPT

## 2021-03-18 PROCEDURE — 82607 VITAMIN B-12: CPT

## 2021-03-18 PROCEDURE — 99284 EMERGENCY DEPT VISIT MOD MDM: CPT | Mod: 25

## 2021-03-18 PROCEDURE — 84484 ASSAY OF TROPONIN QUANT: CPT

## 2021-03-18 RX ORDER — QUETIAPINE FUMARATE 200 MG/1
0 TABLET, FILM COATED ORAL
Qty: 0 | Refills: 0 | DISCHARGE

## 2021-03-18 RX ORDER — POLYETHYLENE GLYCOL 3350 17 G/17G
17 POWDER, FOR SOLUTION ORAL
Qty: 0 | Refills: 0 | DISCHARGE

## 2021-03-18 RX ORDER — BACITRACIN ZINC 500 UNIT/G
1 OINTMENT IN PACKET (EA) TOPICAL
Qty: 0 | Refills: 0 | DISCHARGE
Start: 2021-03-18

## 2021-03-18 RX ADMIN — Medication 1 DROP(S): at 06:23

## 2021-03-18 RX ADMIN — Medication 81 MILLIGRAM(S): at 11:48

## 2021-03-18 RX ADMIN — BRIMONIDINE TARTRATE 1 DROP(S): 2 SOLUTION/ DROPS OPHTHALMIC at 06:23

## 2021-03-18 RX ADMIN — Medication 1 APPLICATION(S): at 06:24

## 2021-03-18 RX ADMIN — HEPARIN SODIUM 5000 UNIT(S): 5000 INJECTION INTRAVENOUS; SUBCUTANEOUS at 06:24

## 2021-03-18 RX ADMIN — CARBIDOPA AND LEVODOPA 1 TABLET(S): 25; 100 TABLET ORAL at 13:34

## 2021-03-18 RX ADMIN — CARBIDOPA AND LEVODOPA 1 TABLET(S): 25; 100 TABLET ORAL at 06:24

## 2021-03-18 RX ADMIN — Medication 1 APPLICATION(S): at 06:30

## 2021-03-18 RX ADMIN — Medication 1 TABLET(S): at 11:49

## 2021-03-18 RX ADMIN — HEPARIN SODIUM 5000 UNIT(S): 5000 INJECTION INTRAVENOUS; SUBCUTANEOUS at 13:34

## 2021-03-18 RX ADMIN — AMLODIPINE BESYLATE 10 MILLIGRAM(S): 2.5 TABLET ORAL at 06:24

## 2021-03-18 NOTE — GOALS OF CARE CONVERSATION - ADVANCED CARE PLANNING - AGENT'S NAME
1rjj Robison cell [phone], H [phone]; 2ry Abdifatah [phone], H [phone]
1rjj Robison cell [phone], H [phone]; 2ry Abdifatah [phone], H [phone]

## 2021-03-18 NOTE — DISCHARGE NOTE PROVIDER - HOSPITAL COURSE
HPI:  Patient is an 87 yo male BIBA from [facility] assisted [facility] with a PMH of HTN, Dyslipidemia, CKD stage 4, Parkinson's Disease, Cognitive Impairment, UC, glaucoma, and acoustic neuroma that presents after 3 falls within 24 hours. Of note patient is a poor historian with fluctuating levels of awareness. Patient was seen in the ED on 3/12 as well as twice on 3/13.  Last night patient states that he went to get up from his chair and took 2 steps before loosing his balance and falling to the ground hitting his head and elbows.  Patient walks with a walker at baseline as states he has done so for a "couple years."  Multiple inconsistencies in his story as he originally stated that his R should hurt but later on stated he was in no pain and was unsure if he hit his head.  (14 Mar 2021 04:13)    86M with Parkinsons, Ulcerative Colitis, HTN, HLD and CKD 4 admitted for increase in falls at the Assisted Living Facility ([facility]).     Increased in Falls   Appears to be from worsening of Parkinson and Gait related issues   Had EP evaluation for bradycardia 2019 and was discontinued off B. Blockers   Telemetry shows no events overnight   Imaging reviewed; Neurology was consulted.   Per Neurology they said  No syncopal episode is reported.  No signs of head injury.  No seizure activity is reported.  Limited but non focal exam.  CT Head - No acute pathology.  CT C spine  -No fx.  No signs of CVA.  H/o limited mobility sec to PD.  Deconditioning.  Continue Sinemet 25/100 mg TID.  Fall/safety precautions.    PT consulted, recommended ROLA    Pt was seen by palliative.  GOC was discussed with them.  Son Ricki spoke with his two brothers and all three do not feel comfortable with proceeding with DNR/DNI without getting their father to consent to it. Writer explained that pt does not capacity for such decisions and that in the end unfortunately the decision will lie on the HCPs (Ricki and Abdifatah) to make. Writer provided supportive counseling to denial and bargaining stages of grief. Also, recommend that family continue this discussion at pt's ROLA with his medical team whenever they're ready.    Hypernatremia  Resolved after IVF.  Patient needs full assistance to eat and drink      CKD 4  Baseline Creatinine appears to be around 2.4, on day of discharge creatinine was 2.17    HTN / HLD   continue Amlodipine  continue Atorvastatin    Parkinson Disease  continue Sinimet TID   Neurology to be consulted, noted as above    UC  Not on any meds    Diet  Regular    Pt was seen by wound care  their recommendations as follow  1. Bilateral upper extremity skin tears: Current orders to use bacitracin BID.    2. Posterior right lower extremity growth: Stable no drainage, no warmth, no erythema, periwound intact: Recommend to make a dermatology appointment.        Disposition  Full Code/Inpatient    Discharge to Rehab    Vital Signs Last 24 Hrs  T(C): 36.5 (18 Mar 2021 10:07), Max: 36.9 (18 Mar 2021 00:54)  T(F): 97.7 (18 Mar 2021 10:07), Max: 98.4 (18 Mar 2021 00:54)  HR: 52 (18 Mar 2021 10:07) (52 - 61)  BP: 125/74 (18 Mar 2021 10:07) (125/74 - 174/78)  BP(mean): --  RR: 17 (18 Mar 2021 10:07) (16 - 17)  SpO2: 97% (18 Mar 2021 10:07) (96% - 97%)    Physical Exam:  CONSTITUTIONAL: Awake, alert, in no apparent distress.  ENMT: Airway patent, moist mucus membranes  EYES: Clear conjunctiva bilaterally, pupils equal, round and reactive to light, (+) B/L IOL.  CARDIAC: Normal rate, regular rhythm, normal S1 & acc S2, no murmurs or extra sounds.   RESPIRATORY: Breath sounds clear and equal bilaterally. No wheezing, rales or rhonchi.  GASTROINTESTINAL: Abdomen soft, non-tender, no guarding. Bowel sounds present.  MUSCULOSKELETAL: No joint tenderness.  NEUROLOGICAL: fluctuating alertness and orientation. No focal deficits, no motor or sensory deficits.  SKIN:  Multiple abrasions on head and all extremities. Dressings places on bilateral arms and legs.  Bilateral stasis dermatitis.  Healing ulceration to R posterior lower leg.  VASCULAR: 2+ radial, brachial pulses B/L, no carotid bruits.  PSYCH: No agitation.

## 2021-03-18 NOTE — DISCHARGE NOTE PROVIDER - NSDCMRMEDTOKEN_GEN_ALL_CORE_FT
amLODIPine 10 mg oral tablet: 1 tab(s) orally once a day  Aquaphor topical ointment: Apply topically to affected area 2 times a day  Artificial Tears ophthalmic solution: 2 drop(s) to each affected eye every 2 hours, As Needed  aspirin 81 mg oral tablet: 1 tab(s) orally once a day  brimonidine 0.2% ophthalmic solution: 1 drop(s) to each affected eye 2 times a day  carbidopa-levodopa 25 mg-100 mg oral tablet: 1 tab(s) orally 3 times a day  Melatonin 3 mg oral tablet: 2 tab(s) orally once (at bedtime)  Metoprolol Tartrate 50 mg oral tablet: 1 tab(s) orally 2 times a day  Milk of Magnesia: 30 milliliter(s) orally once a day, As Needed  MiraLax oral powder for reconstitution: 17 gram(s) orally Monday, Wednesday, and Friday  MiraLax oral powder for reconstitution: 17 gram(s) orally once a day, As Needed  QUEtiapine 25 mg oral tablet: orally once a day (at bedtime)  rosuvastatin 10 mg oral tablet: 1 tab(s) orally once a day  Senna Plus: 2  orally once a day (at bedtime)  Therems M oral tablet: 1 tab(s) orally once a day  timolol hemihydrate 0.5% ophthalmic solution: 1 drop(s) to each affected eye 2 times a day  Tylenol 325 mg oral tablet: 2 tab(s) orally 3 times a day, As Needed   amLODIPine 10 mg oral tablet: 1 tab(s) orally once a day  Aquaphor topical ointment: Apply topically to affected area 2 times a day  Artificial Tears ophthalmic solution: 2 drop(s) to each affected eye every 2 hours, As Needed  aspirin 81 mg oral tablet: 1 tab(s) orally once a day  Baciguent 500 units/g topical ointment: 1 application topically 2 times a day  brimonidine 0.2% ophthalmic solution: 1 drop(s) to each affected eye 2 times a day  carbidopa-levodopa 25 mg-100 mg oral tablet: 1 tab(s) orally 3 times a day  Melatonin 3 mg oral tablet: 2 tab(s) orally once (at bedtime)  Metoprolol Tartrate 50 mg oral tablet: 1 tab(s) orally 2 times a day  Milk of Magnesia: 30 milliliter(s) orally once a day, As Needed  MiraLax oral powder for reconstitution: 17 gram(s) orally Monday, Wednesday, and Friday  MiraLax oral powder for reconstitution: 17 gram(s) orally once a day, As Needed  rosuvastatin 10 mg oral tablet: 1 tab(s) orally once a day  Senna Plus: 2  orally once a day (at bedtime)  Therems M oral tablet: 1 tab(s) orally once a day  timolol hemihydrate 0.5% ophthalmic solution: 1 drop(s) to each affected eye 2 times a day  Tylenol 325 mg oral tablet: 2 tab(s) orally 3 times a day, As Needed   amLODIPine 10 mg oral tablet: 1 tab(s) orally once a day  Aquaphor topical ointment: Apply topically to affected area 2 times a day  Artificial Tears ophthalmic solution: 2 drop(s) to each affected eye every 2 hours, As Needed  aspirin 81 mg oral tablet: 1 tab(s) orally once a day  Baciguent 500 units/g topical ointment: 1 application topically 2 times a day  brimonidine 0.2% ophthalmic solution: 1 drop(s) to each affected eye 2 times a day  carbidopa-levodopa 25 mg-100 mg oral tablet: 1 tab(s) orally 3 times a day  Melatonin 3 mg oral tablet: 2 tab(s) orally once (at bedtime)  Metoprolol Tartrate 50 mg oral tablet: 1 tab(s) orally 2 times a day  Milk of Magnesia: 30 milliliter(s) orally once a day, As Needed  MiraLax oral powder for reconstitution: 17 gram(s) orally Monday, Wednesday, and Friday  rosuvastatin 10 mg oral tablet: 1 tab(s) orally once a day  Senna Plus: 2  orally once a day (at bedtime)  Therems M oral tablet: 1 tab(s) orally once a day  timolol hemihydrate 0.5% ophthalmic solution: 1 drop(s) to each affected eye 2 times a day  Tylenol 325 mg oral tablet: 2 tab(s) orally 3 times a day, As Needed

## 2021-03-18 NOTE — PROGRESS NOTE ADULT - ASSESSMENT
Pt is seen for Frequent Falls  H/o Parkinson disease  No syncopal episode is reported.  No signs of head injury.  No seizure activity is reported.  Limited but non focal exam.  Pt is Dehydrated with BUN/Cr - 40/2.53, now 50/2.49  CT Head - No acute pathology.  CT C spine  -No fx.  No signs of CVA.  H/o limited mobility sec to PD.  Deconditioning.  IV Fluids  Continue Sinemet 25/100 mg TID.  Vitamin B12 level - 514  PT.  Fall/safety precautions.  D/w Dr. Oswald.  Would continue to follow.   Pt is seen for Frequent Falls  H/o Parkinson disease  No syncopal episode is reported.  No signs of head injury.  No seizure activity is reported.  Pt is Dehydrated with BUN/Cr - 40/2.53, now 50/2.49  CT Head - No acute pathology.  CT C spine  -No fx.  No signs of CVA.  H/o limited mobility sec to PD/deconditioning.  Continue Sinemet 25/100 mg TID.  Vitamin B12 level - 514  Agree for discharge.

## 2021-03-18 NOTE — DISCHARGE NOTE PROVIDER - CARE PROVIDER_API CALL
[place] [address]  Phone: [phone]  Fax: [phone]  Follow Up Time:    Eleazar Goddard  [facility]  [address], [address]  [place] [address]  Phone: [phone]  Fax: [phone]  Follow Up Time:

## 2021-03-18 NOTE — DISCHARGE NOTE PROVIDER - DETAILS OF MALNUTRITION DIAGNOSIS/DIAGNOSES
This patient has been assessed with a concern for Malnutrition and was treated during this hospitalization for the following Nutrition diagnosis/diagnoses:     -  03/14/2021: Moderate protein-calorie malnutrition   This patient has been assessed with a concern for Malnutrition and was treated during this hospitalization for the following Nutrition diagnosis/diagnoses:     -  03/14/2021: Moderate protein-calorie malnutrition    This patient has been assessed with a concern for Malnutrition and was treated during this hospitalization for the following Nutrition diagnosis/diagnoses:     -  03/14/2021: Moderate protein-calorie malnutrition   This patient has been assessed with a concern for Malnutrition and was treated during this hospitalization for the following Nutrition diagnosis/diagnoses:     -  03/14/2021: Moderate protein-calorie malnutrition    This patient has been assessed with a concern for Malnutrition and was treated during this hospitalization for the following Nutrition diagnosis/diagnoses:     -  03/14/2021: Moderate protein-calorie malnutrition    This patient has been assessed with a concern for Malnutrition and was treated during this hospitalization for the following Nutrition diagnosis/diagnoses:     -  03/14/2021: Moderate protein-calorie malnutrition

## 2021-03-18 NOTE — PROGRESS NOTE ADULT - SUBJECTIVE AND OBJECTIVE BOX
Neurology Follow up note    TOBIAS SAMUELS 86y Male    HPI:  Patient is an 85 yo male BIBA from [place] assisted living with a PMH of HTN, Dyslipidemia, CKD stage 4, Parkinson's Disease, Cognitive Impairment, UC, glaucoma, and acoustic neuroma that presents after 3 falls within 24 hours. Of note patient is a poor historian with fluctuating levels of awareness. Patient was seen in the ED on 3/12 as well as twice on 3/13.  Last night patient states that he went to get up from his chair and took 2 steps before loosing his balance and falling to the ground hitting his head and elbows.  Patient walks with a walker at baseline as states he has done so for a "couple years."  Multiple inconsistencies in his story as he originally stated that his R should hurt but later on stated he was in no pain and was unsure if he hit his head.  (14 Mar 2021 04:13)      Interval History -    Patient is seen, chart was reviewed and case was discussed with the treatment team.  Pt is not in any distress.   Lying on bed comfortably.   No events reported overnight.   No clinical seizure was reported.  Sitting on chair bed comfortably.    is at bedside.    Vital Signs Last 24 Hrs  T(C): 37.1 (18 Mar 2021 13:30), Max: 37.1 (18 Mar 2021 13:30)  T(F): 98.7 (18 Mar 2021 13:30), Max: 98.7 (18 Mar 2021 13:30)  HR: 57 (18 Mar 2021 13:30) (52 - 61)  BP: 101/57 (18 Mar 2021 13:30) (101/57 - 174/78)  BP(mean): --  RR: 17 (18 Mar 2021 13:30) (16 - 17)  SpO2: 96% (18 Mar 2021 13:30) (96% - 97%)        REVIEW OF SYSTEMS:    Constitutional: No fever, weight loss or fatigue  Eyes: No eye pain, visual disturbances, or discharge  ENT:  No difficulty hearing, tinnitus, vertigo; No sinus or throat pain  Neck: No pain or stiffness  Respiratory: No cough, wheezing, chills or hemoptysis  Cardiovascular: No chest pain, palpitations, shortness of breath, dizziness or leg swelling  Gastrointestinal: No abdominal or epigastric pain. No nausea, vomiting or hematemesis; No diarrhea or constipation. No melena or hematochezia.  Genitourinary: No dysuria, frequency, hematuria or incontinence  Neurological: No headaches, memory loss, loss of strength, numbness or tremors  Psychiatric: No depression, anxiety, mood swings or difficulty sleeping  Musculoskeletal: No joint pain or swelling; No muscle, back or extremity pain  Skin: No itching, burning, rashes or lesions   Lymph Nodes: No enlarged glands  Endocrine: No heat or cold intolerance; No hair loss, No h/o diabetes or thyroid dysfunction  Allergy and Immunologic: No hives or eczema    On Neurological Examination:    Mental Status - Pt is alert, awake, oriented X3. Higher functions are intact. Pt. does have mild poor cognition. Follows commands well and able to answer questions appropriately.    Speech -  Normal. Slurred. Pt has no aphasia.    Cranial Nerves - Pupils 3 mm equal and reactive to light, extraocular eye movements intact. Pt has no visual field deficit.  Pt has no right left facial asymmetry. Tongue - is in midline.    Motor Exam - 4 plus/5 all over, No drift. No shaking or tremors.  Muscle tone - is normal all over. Moves all extremities equally. No asymmetry is seen.      Sensory Exam - Pin prick, temperature, joint position and vibration are intact on either side. Pt withdraws all extremities equally on stimulation. No asymmetry seen. No complaints of tingling, numbness.    Gait - Able to stand and walk unassisted. Pt is able to stand up with holding my hands and is able to walk for few feet around the bed. Not falling to either side.    Deep tendon Reflexes - 2 plus all over.    Coordination - Fine finger movements are normal on both sides. Finger to nose is also normal on both sides.       Romberg - Negative.    Neck Supple -  Yes.     MEDICATIONS    acetaminophen   Tablet .. 650 milliGRAM(s) Oral every 8 hours PRN  amLODIPine   Tablet 10 milliGRAM(s) Oral daily  artificial  tears Solution 1 Drop(s) Both EYES every 2 hours PRN  aspirin enteric coated 81 milliGRAM(s) Oral daily  atorvastatin 40 milliGRAM(s) Oral at bedtime  BACItracin   Ointment 1 Application(s) Topical two times a day  brimonidine 0.2% Ophthalmic Solution 1 Drop(s) Both EYES two times a day  carbidopa/levodopa  25/100 1 Tablet(s) Oral three times a day  heparin   Injectable 5000 Unit(s) SubCutaneous every 8 hours  magnesium hydroxide Suspension 30 milliLiter(s) Oral daily PRN  melatonin 5 milliGRAM(s) Oral at bedtime  metoprolol tartrate 50 milliGRAM(s) Oral two times a day  mineral oil/petrolatum Hydrophilic Ointment 1 Application(s) Topical two times a day  multivitamin/minerals 1 Tablet(s) Oral daily  polyethylene glycol 3350 17 Gram(s) Oral daily PRN  senna 2 Tablet(s) Oral at bedtime  timolol 0.5% Solution 1 Drop(s) Both EYES two times a day      Allergies    No Known Allergies    Intolerances        LABS:    CBC Full  -  ( 18 Mar 2021 08:00 )  WBC Count : 6.52 K/uL  RBC Count : 3.66 M/uL  Hemoglobin : 10.9 g/dL  Hematocrit : 34.3 %  Platelet Count - Automated : 213 K/uL  Mean Cell Volume : 93.7 fl  Mean Cell Hemoglobin : 29.8 pg  Mean Cell Hemoglobin Concentration : 31.8 gm/dL  Auto Neutrophil # : x  Auto Lymphocyte # : x  Auto Monocyte # : x  Auto Eosinophil # : x  Auto Basophil # : x  Auto Neutrophil % : x  Auto Lymphocyte % : x  Auto Monocyte % : x  Auto Eosinophil % : x  Auto Basophil % : x      03-18    140  |  105  |  45<H>  ----------------------------<  133<H>  4.1   |  25  |  2.17<H>    Ca    9.2      18 Mar 2021 08:00      Hemoglobin A1C:     Vitamin B12     RADIOLOGY    ASSESSMENT AND PLAN:          Advanced care planning was discussed with family. Pt is full code.  Pt is accessed signs of Clinical depression. Betsy has no signs of depression.  Risk of falls accessed. Fall prevention and plan of care is in place.  Pt is screened for tobacco and alcohol use. Counseling was done for smoking and alcohol cessation. Pt doesn't smoke or drink.  Use of narcotic pain meds was discussed. Pt is advised to use narcotic meds wisely and to refrain from over using them.  Plan of care was discussed with patient family. Questions answered.  Would continue to follow.           Neurology Follow up note    TOBIAS SAMUELS 86y Male    TOBIAS SAMUELS 86y Male    HPI: 87 yo male BIBA from [place] assisted with a PMH of HTN, Dyslipidemia, CKD stage 4, Parkinson's Disease, Cognitive Impairment, UC, glaucoma, and acoustic neuroma that presented on 3/13 after 3 falls within 24 hours. Of note patient is a poor historian with fluctuating levels of awareness. Last night patient states that he went to get up from his chair and took 2 steps before loosing his balance and falling to the ground hitting his head and elbows.  Patient walks with a walker at baseline as states he has done so for a "couple years."  Multiple inconsistencies in his story as he originally stated that his R should hurt but later on stated he was in no pain and was unsure if he hit his head.  (14 Mar 2021 04:13)  No lateralized weakness.  No Facial asymmetry.  Pt was found to have renal insufficiency.  CT Head  -No acute pathology.  CT C spine  -DJD. No Fx.    Interval History -    Patient is seen, chart was reviewed and case was discussed with the treatment team.  Seen earlier.    Vital Signs Last 24 Hrs  T(C): 37.1 (18 Mar 2021 13:30), Max: 37.1 (18 Mar 2021 13:30)  T(F): 98.7 (18 Mar 2021 13:30), Max: 98.7 (18 Mar 2021 13:30)  HR: 57 (18 Mar 2021 13:30) (52 - 61)  BP: 101/57 (18 Mar 2021 13:30) (101/57 - 174/78)  BP(mean): --  RR: 17 (18 Mar 2021 13:30) (16 - 17)  SpO2: 96% (18 Mar 2021 13:30) (96% - 97%)    MEDICATIONS    acetaminophen   Tablet .. 650 milliGRAM(s) Oral every 8 hours PRN  amLODIPine   Tablet 10 milliGRAM(s) Oral daily  artificial  tears Solution 1 Drop(s) Both EYES every 2 hours PRN  aspirin enteric coated 81 milliGRAM(s) Oral daily  atorvastatin 40 milliGRAM(s) Oral at bedtime  BACItracin   Ointment 1 Application(s) Topical two times a day  brimonidine 0.2% Ophthalmic Solution 1 Drop(s) Both EYES two times a day  carbidopa/levodopa  25/100 1 Tablet(s) Oral three times a day  heparin   Injectable 5000 Unit(s) SubCutaneous every 8 hours  magnesium hydroxide Suspension 30 milliLiter(s) Oral daily PRN  melatonin 5 milliGRAM(s) Oral at bedtime  metoprolol tartrate 50 milliGRAM(s) Oral two times a day  mineral oil/petrolatum Hydrophilic Ointment 1 Application(s) Topical two times a day  multivitamin/minerals 1 Tablet(s) Oral daily  polyethylene glycol 3350 17 Gram(s) Oral daily PRN  senna 2 Tablet(s) Oral at bedtime  timolol 0.5% Solution 1 Drop(s) Both EYES two times a day    Allergies    No Known Allergies    On Neurological Examination:    Mask like facies.    Mental Status - Asleep. Arousable.  Poor cognition. Follows simple commands.    Speech -  Slow, hypophonic. No aphasia.    Cranial Nerves - Pupils 3 mm equal and reactive to light.  Pt has no facial asymmetry. Tongue - is in midline.    Motor Exam - 4 plus/5 all over, No resting tremors. Has mod cogwheel and clasp knife rigidity.     Sensory Exam - Pt withdraws all extremities equally on stimulation.     Gait - Requires much assistance in standing.     Deep tendon Reflexes - 2 plus all over.    Coordination - unable to do.      Neck Supple -  Yes.    LABS:    CBC Full  -  ( 18 Mar 2021 08:00 )  WBC Count : 6.52 K/uL  RBC Count : 3.66 M/uL  Hemoglobin : 10.9 g/dL  Hematocrit : 34.3 %  Platelet Count - Automated : 213 K/uL  Mean Cell Volume : 93.7 fl  Mean Cell Hemoglobin : 29.8 pg  Mean Cell Hemoglobin Concentration : 31.8 gm/dL    03-18    140  |  105  |  45<H>  ----------------------------<  133<H>  4.1   |  25  |  2.17<H>    Ca    9.2      18 Mar 2021 08:00    Vitamin B12, Serum: 514 pg/mL (03-16 @ 14:31)    RADIOLOGY & ADDITIONAL STUDIES:    ct< from: CT Head No Cont (03.13.21 @ 22:31) >  No acute intracranial hemorrhage, mass effect, or acute displaced calvarium fracture. Paranasal sinus disease as described.    < end of copied text >  < from: CT Cervical Spine No Cont (03.13.21 @ 13:58) >  IMPRESSION: Degenerative change involving the cervical spine without evidence of fracture or dislocation seen.    < end of copied text >

## 2021-03-18 NOTE — GOALS OF CARE CONVERSATION - ADVANCED CARE PLANNING - CONVERSATION DETAILS
Son Ricki spoke with his two brothers and all three do not feel comfortable with proceeding with DNR/DNI without getting their father to consent to it. Writer explained that pt does not capacity for such decisions and that in the end unfortunately the decision will lie on the HCPs (Ricki and Abdifatha) to make. Writer provided supportive counseling to denial and bargaining stages of grief. Also, recommend that family continue this discussion at pt's [facility] with his medical team whenever they're ready.
Son Ricki spoke with his brother/2ry HCP Tiarra regarding MOLST form of DNR/DNI and they are in agreement however he did not get to speak with pt's third son Manish who has not been on good terms with the siblings and is in denial about pt's decline. Ricki did not feel comfortable proceeding with filling out MOLST form today without discussing this with Manish first. He asked that writer give him one more day time and follow up tomorrow. Writer explained that MOLST form is not a permanent document and can be changed based on GOC and plan for the future. Writer will call Ricki tomorrow AM prior to pt being dc'd to [facility].

## 2021-09-27 NOTE — PATIENT DISCUSSION
BLEPHARITIS, OD: PRESCRIBE WARM COMPRESSES AND EYELID SCRUBS QD-BID, ARTIFICIAL TEARS BID-QID. RETURN FOR FOLLOW-UP AS SCHEDULED.

## 2021-09-27 NOTE — PATIENT DISCUSSION
EXTERNAL HORDEOLUM  RLL: PRESCRIBED WARM COMPRESSES, EYELID SCRUBS AND ERYTHROMYCIN SANTOS QHS. CALL/RTC IF SYMPTOMS PERSIST- DISCUSSED REFERRAL TO DR. Norbert Granados OR [facility] IF PERSISTENT.

## 2021-12-26 NOTE — PROGRESS NOTE ADULT - PROVIDER SPECIALTY LIST ADULT
[facility]  EMERGENCY DEPT VISIT      Patient Identification  Ayo Bansal is a 26 y.o. female. Chief Complaint   Abdominal Pain (NVD ) and Pregnancy Test      History of Present Illness: This is a  26 y.o. female who presentsvia ambulance to the ED with complaints of 2 day h/o subjective fever, dry cough, nausea and vomiting, and diarrhea. Had headache yesterday. No sinus congestion or sore throat. No chest pain or trouble breathing. Last period was longer than usual but she is not late. No past medical history on file. No past surgical history on file. No current facility-administered medications for this encounter.     Current Outpatient Medications:     ondansetron (ZOFRAN ODT) 4 MG disintegrating tablet, Take 1 tablet by mouth every 8 hours as needed for Nausea or Vomiting, Disp: 15 tablet, Rfl: 0    cefUROXime (CEFTIN) 250 MG tablet, Take 1 tablet by mouth 2 times daily for 7 days, Disp: 14 tablet, Rfl: 0    metroNIDAZOLE (FLAGYL) 500 MG tablet, Take 1 tablet by mouth 2 times daily for 7 days, Disp: 14 tablet, Rfl: 0    dicyclomine (BENTYL) 20 MG tablet, Take 1 tablet by mouth every 6 hours as needed (abdominal cramping), Disp: 12 tablet, Rfl: 0    No Known Allergies    Social History     Socioeconomic History    Marital status: Single     Spouse name: Not on file    Number of children: Not on file    Years of education: Not on file    Highest education level: Not on file   Occupational History    Not on file   Tobacco Use    Smoking status: Never Smoker    Smokeless tobacco: Never Used   Substance and Sexual Activity    Alcohol use: Not Currently    Drug use: Not Currently    Sexual activity: Not on file   Other Topics Concern    Not on file   Social History Narrative    Not on file     Social Determinants of Health     Financial Resource Strain:     Difficulty of Paying Living Expenses: Not on file   Food Insecurity:     Worried About Running Out of Food in the Last Year: Trauma Surgery Not on file    Ran Out of Food in the Last Year: Not on file   Transportation Needs:     Lack of Transportation (Medical): Not on file    Lack of Transportation (Non-Medical): Not on file   Physical Activity:     Days of Exercise per Week: Not on file    Minutes of Exercise per Session: Not on file   Stress:     Feeling of Stress : Not on file   Social Connections:     Frequency of Communication with Friends and Family: Not on file    Frequency of Social Gatherings with Friends and Family: Not on file    Attends Temple Services: Not on file    Active Member of [address] [place] [address] or Organizations: Not on file    Attends Club or Organization Meetings: Not on file    Marital Status: Not on file   Intimate Partner Violence:     Fear of Current or Ex-Partner: Not on file    Emotionally Abused: Not on file    Physically Abused: Not on file    Sexually Abused: Not on file   Housing Stability:     Unable to Pay for Housing in the Last Year: Not on file    Number of [place] in the Last Year: Not on file    Unstable Housing in the Last Year: Not on file       Nursing Notes Reviewed      ROS:  GENERAL:  + fever, + chills, + diaphoresis, no appetite changes  EYES: no eye discharge, no eye redness, no visual changes  ENT: no nasal congestion, no sore throat  CARDIAC: no chest pain, no palpitations, no leg swelling  PULM: + cough, no shortness of breath  ABD: no abdominal pain, + nausea, + vomiting, + diarrhea, no melena or hematochezia  : no dysuria, no hematuria, no urgency, no frequency. No flank pain  MUSCULOSKELETAL: no back pain, no arthralgias, no myalgias  NEURO: no headache, no lightheadedness, no dizziness, no numbness, no weakness, no syncope  SKIN: no rashes, no erythema, no wounds, no ecchymosis      PHYSICAL EXAM:  GENERAL APPEARANCE: Rolando Parra is in no acute respiratory distress. Awake and alert.   VITAL SIGNS:   ED Triage Vitals [12/26/21 1528]   Enc Vitals Group      /65      Heart Rate 56      Resp 20      Temp 98.3 °F (36.8 °C)      Temp Source Oral      SpO2 100 %      Weight - Scale 104 lb 3.2 oz (47.3 kg)      Height 5' 4\" (1.626 m)      Head Circumference       Peak Flow       Pain Score       Pain Loc       Pain Edu? Excl. in [address]? HEAD: Normocephalic, atraumatic. EYES:  Extraocular muscles are intact. Pupils equal round and reactive to light. Conjunctivas are pink. Negative scleral icterus. ENT:  Mucous membranes are moist.  Pharynx without erythema or exudates. NECK: Nontender and supple. No cervical adenopathy. CHEST:  Clear to auscultation bilaterally. No rales, rhonchi, or wheezing. HEART:  Regular rate and regular rhythm. No murmurs. Strong and equal pulses in the upper and lower extremities. ABDOMEN: Soft,  nondistended, positive bowel sounds. abdomen is nontender. No rebound. no guarding. MUSCULOSKELETAL: The calves are nontender to palpation. Active range of motion of the upper and lower extremities. No edema. NEUROLOGICAL: Awake, alert and oriented x 3. Power intact in the upper and lower extremities. DERMATOLOGIC: No petechiae, rashes, or ecchymoses. No erythema. PSYCH: normal mood and affect. Normal thought content. ED COURSE AND MEDICAL DECISION MAKING:      Radiology:  Films have been read by radiologist as noted in chart unless otherwise stated. Other radiologic studies (i.e. CT, MRI, ultrasounds, etc ) have been interpreted by radiologist.     No orders to display       Labs:  Results for orders placed or performed during the hospital encounter of 12/26/21   Culture, Urine    Specimen: Urine, clean catch   Result Value Ref Range    Urine Culture, Routine       <50,000 CFU/ml mixed skin/urogenital hero.  No further workup   C.trachomatis N.gonorrhoeae DNA    Specimen: Cervix   Result Value Ref Range    C. trachomatis DNA Negative Negative    N. gonorrhoeae DNA Negative Negative   Wet prep, genital    Specimen: Vaginal   Result Value Ref Range    Trichomonas Prep PRESENT  2+ (A)     Yeast, Wet Prep None Seen     Clue Cells, Wet Prep 2+ (A)     WBC, Wet Prep 4+     RBC, Wet Prep 2+     Epi Cells 1+     Bacteria 4+     Source Wet Prep Vaginal    Urinalysis Reflex to Culture    Specimen: Urine, clean catch   Result Value Ref Range    Color, UA Yellow Straw/Yellow    Clarity, UA CLOUDY (A) Clear    Glucose, Ur Negative Negative mg/dL    Bilirubin Urine SMALL (A) Negative    Ketones, Urine Negative Negative mg/dL    Specific Gravity, UA >=1.030 1.005 - 1.030    Blood, Urine MODERATE (A) Negative    pH, UA 6.0 5.0 - 8.0    Protein, UA TRACE (A) Negative mg/dL    Urobilinogen, Urine 0.2 <2.0 E.U./dL    Nitrite, Urine Negative Negative    Leukocyte Esterase, Urine MODERATE (A) Negative    Microscopic Examination YES     Urine Type NotGiven     Urine Reflex to Culture Yes    Pregnancy, Urine   Result Value Ref Range    HCG(Urine) Pregnancy Test Negative Detects HCG level >20 MIU/mL   Microscopic Urinalysis   Result Value Ref Range    WBC, UA >100 (A) 0 - 5 /HPF    RBC, UA see below (A) 0 - 4 /HPF    Bacteria, UA 4+ (A) None Seen /HPF    Trichomonas, UA Present (A) None Seen /HPF    Urinalysis Comments see below    CBC Auto Differential   Result Value Ref Range    WBC 6.3 4.0 - 11.0 K/uL    RBC 4.36 4.00 - 5.20 M/uL    Hemoglobin 13.0 12.0 - 16.0 g/dL    Hematocrit 38.7 36.0 - 48.0 %    MCV 88.8 80.0 - 100.0 fL    MCH 29.9 26.0 - 34.0 pg    MCHC 33.6 31.0 - 36.0 g/dL    RDW 12.8 12.4 - 15.4 %    Platelets 486 842 - 602 K/uL    MPV 8.7 5.0 - 10.5 fL    Neutrophils % 66.6 %    Lymphocytes % 22.2 %    Monocytes % 7.3 %    Eosinophils % 3.2 %    Basophils % 0.7 %    Neutrophils Absolute 4.2 1.7 - 7.7 K/uL    Lymphocytes Absolute 1.4 1.0 - 5.1 K/uL    Monocytes Absolute 0.5 0.0 - 1.3 K/uL    Eosinophils Absolute 0.2 0.0 - 0.6 K/uL    Basophils Absolute 0.0 0.0 - 0.2 K/uL   Comprehensive Metabolic Panel   Result Value Ref Range    Sodium 139 136 - 145 mmol/L disposition reasonable. Also, there is no evidence or peritonitis, sepsis, or toxicity. Rolando Parra and I have discussed the diagnosis and risks, and we agree with discharging home to follow-up with their primary doctor. We also discussed returning to the Emergency Department immediately if new or worsening symptoms occur. Clinical Impression:  1. Nausea vomiting and diarrhea    2. Abdominal pain, unspecified abdominal location    3. Urinary tract infection without hematuria, site unspecified    4. Trichomonas vaginitis    5. Bacterial vaginosis        Dispo:  Patient will be discharged  at this time. Patient was informed of this decision and agrees with plan. I have discussed lab and xray findings with patient and they understand. Questions were answered to the best of my ability. Discharge vitals:  Blood pressure 106/65, pulse 85, temperature 98.3 °F (36.8 °C), temperature source Oral, resp. rate 18, height 5' 4\" (1.626 m), weight 104 lb 3.2 oz (47.3 kg), last menstrual period 12/19/2021, SpO2 100 %. Prescriptions given:   Discharge Medication List as of 12/26/2021  7:20 PM      START taking these medications    Details   ondansetron (ZOFRAN ODT) 4 MG disintegrating tablet Take 1 tablet by mouth every 8 hours as needed for Nausea or Vomiting, Disp-15 tablet, R-0Print      cefUROXime (CEFTIN) 250 MG tablet Take 1 tablet by mouth 2 times daily for 7 days, Disp-14 tablet, R-0Print      metroNIDAZOLE (FLAGYL) 500 MG tablet Take 1 tablet by mouth 2 times daily for 7 days, Disp-14 tablet, R-0Print      dicyclomine (BENTYL) 20 MG tablet Take 1 tablet by mouth every 6 hours as needed (abdominal cramping), Disp-12 tablet, R-0Print               This chart was created using Dragon voice recognition software.         Dora Schofield MD  12/28/21 5733

## 2022-06-04 ENCOUNTER — TELEPHONE ENCOUNTER (OUTPATIENT)
Dept: URBAN - METROPOLITAN AREA CLINIC 68 | Facility: CLINIC | Age: 87
End: 2022-06-04

## 2022-06-04 RX ORDER — MESALAMINE 1000 MG/1
SUPPOSITORY RECTAL
Qty: 15 | Refills: 0 | OUTPATIENT
Start: 2013-08-09 | End: 2013-08-24

## 2022-06-04 RX ORDER — MELATONIN/PYRIDOXINE HCL (B6) 5 MG-10 MG
MELATONIN TR( 5-10MG ORAL  DAILY ) INACTIVE -HX ENTRY TABLET,IMMED, EXTENDED RELEASE, BIPHASIC ORAL DAILY
OUTPATIENT
Start: 2013-01-24

## 2022-06-04 RX ORDER — LORATADINE 10 MG
TABLET,DISINTEGRATING ORAL
Qty: 30 | Refills: 30 | OUTPATIENT
Start: 2012-11-02 | End: 2013-01-24

## 2022-06-04 RX ORDER — PREDNISONE 10 MG/1
TABLET ORAL DAILY
Qty: 30 | Refills: 0 | OUTPATIENT
Start: 2015-06-17 | End: 2015-07-17

## 2022-06-04 RX ORDER — OMEGA-3-ACID ETHYL ESTERS 1 G/1
LOVAZA( 1GM ORAL  TWICE  A DAY ) INACTIVE -HX ENTRY CAPSULE, LIQUID FILLED ORAL
OUTPATIENT
Start: 2013-01-24

## 2022-06-04 RX ORDER — PREDNISONE 10 MG/1
TABLET ORAL
Qty: 30 | Refills: 30 | OUTPATIENT
Start: 2014-02-19 | End: 2014-03-13

## 2022-06-05 ENCOUNTER — TELEPHONE ENCOUNTER (OUTPATIENT)
Dept: URBAN - METROPOLITAN AREA CLINIC 68 | Facility: CLINIC | Age: 87
End: 2022-06-05

## 2022-06-05 RX ORDER — NEBIVOLOL HYDROCHLORIDE 20 MG/1
BYSTOLIC( 20MG ORAL  DAILY ) ACTIVE -HX ENTRY TABLET ORAL DAILY
Status: ACTIVE | COMMUNITY
Start: 2016-06-22

## 2022-06-05 RX ORDER — LATANOPROST 0.005 %
XALATAN( 0.005% OPHTHALMIC  DAILY ) ACTIVE -HX ENTRY DROPS OPHTHALMIC (EYE) DAILY
Status: ACTIVE | COMMUNITY
Start: 2016-06-22

## 2022-06-05 RX ORDER — LISINOPRIL 40 MG/1
LISINOPRIL( 40MG ORAL  DAILY ) ACTIVE -HX ENTRY TABLET ORAL DAILY
Status: ACTIVE | COMMUNITY
Start: 2016-06-22

## 2022-06-05 RX ORDER — ALLOPURINOL 100 MG/1
ALLOPURINOL( 100MG ORAL  DAILY ) ACTIVE -HX ENTRY TABLET ORAL DAILY
Status: ACTIVE | COMMUNITY
Start: 2016-06-22

## 2022-06-05 RX ORDER — CLONAZEPAM 0.5 MG/1
CLONAZEPAM( 0.5MG ORAL  DAILY ) ACTIVE -HX ENTRY TABLET, ORALLY DISINTEGRATING ORAL DAILY
Status: ACTIVE | COMMUNITY
Start: 2016-06-22

## 2022-06-05 RX ORDER — DOCUSATE SODIUM 100 MG/1
CAPSULE ORAL AT BEDTIME
Qty: 60 | Refills: 60 | Status: ACTIVE | COMMUNITY
Start: 2016-06-22

## 2022-06-05 RX ORDER — HYDROCHLOROTHIAZIDE 25 MG/1
HYDROCHLOROTHIAZIDE( 25MG ORAL  DAILY ) ACTIVE -HX ENTRY TABLET ORAL DAILY
Status: ACTIVE | COMMUNITY
Start: 2016-06-22

## 2022-06-05 RX ORDER — AMLODIPINE BESYLATE 5 MG/1
NORVASC( 5MG ORAL  DAILY ) ACTIVE -HX ENTRY TABLET ORAL DAILY
Status: ACTIVE | COMMUNITY
Start: 2016-06-22

## 2022-06-05 RX ORDER — SULFASALAZINE 500 MG/1
TABLET ORAL
Qty: 720 | Refills: 720 | Status: ACTIVE | COMMUNITY
Start: 2016-12-28

## 2022-06-05 RX ORDER — MULTIVITAMIN
MULTIPLE VITAMIN(  ORAL  DAILY ) ACTIVE -HX ENTRY TABLET ORAL DAILY
Status: ACTIVE | COMMUNITY
Start: 2016-06-22

## 2022-06-25 ENCOUNTER — TELEPHONE ENCOUNTER (OUTPATIENT)
Age: 87
End: 2022-06-25

## 2022-06-25 RX ORDER — ALUMINUM ZIRCONIUM TETRACHLOROHYDREX GLY 0.18 G/G
STICK TOPICAL DAILY
Qty: 30 | Refills: 30 | OUTPATIENT
Start: 2012-11-02 | End: 2013-01-24

## 2022-06-25 RX ORDER — OMEGA-3-ACID ETHYL ESTERS 1 G/1
LOVAZA( 1GM ORAL  TWICE  A DAY ) INACTIVE -HX ENTRY CAPSULE, LIQUID FILLED ORAL
OUTPATIENT
Start: 2013-01-24

## 2022-06-25 RX ORDER — MELATONIN/PYRIDOXINE HCL (B6) 5 MG-10 MG
MELATONIN TR( 5-10MG ORAL  DAILY ) INACTIVE -HX ENTRY TABLET,IMMED, EXTENDED RELEASE, BIPHASIC ORAL DAILY
OUTPATIENT
Start: 2013-01-24

## 2022-06-25 RX ORDER — MESALAMINE 1000 MG/1
SUPPOSITORY RECTAL
Qty: 15 | Refills: 0 | OUTPATIENT
Start: 2013-08-09 | End: 2013-08-24

## 2022-06-25 RX ORDER — POLYETHYLENE GLYCOL 3350 17 G
POWDER IN PACKET (EA) ORAL
Qty: 30 | Refills: 30 | OUTPATIENT
Start: 2012-11-02 | End: 2013-01-24

## 2022-06-25 RX ORDER — PREDNISONE 10 MG/1
TABLET ORAL DAILY
Qty: 30 | Refills: 0 | OUTPATIENT
Start: 2015-06-17 | End: 2015-07-17

## 2022-06-25 RX ORDER — PREDNISONE 10 MG/1
TABLET ORAL
Qty: 30 | Refills: 30 | OUTPATIENT
Start: 2014-02-19 | End: 2014-03-13

## 2022-06-26 ENCOUNTER — TELEPHONE ENCOUNTER (OUTPATIENT)
Age: 87
End: 2022-06-26

## 2022-06-26 RX ORDER — ASPRIN AND EXTENDED-RELEASE DIPYRIDAMOLE 25; 200 MG/1; MG/1
AGGRENOX( 25-200MG ORAL  TWICE A DAY ) ACTIVE -HX ENTRY CAPSULE ORAL TWICE A DAY
Status: ACTIVE | COMMUNITY
Start: 2016-06-22

## 2022-06-26 RX ORDER — DOCUSATE SODIUM 100 MG/1
CAPSULE ORAL AT BEDTIME
Qty: 60 | Refills: 60 | Status: ACTIVE | COMMUNITY
Start: 2016-06-22

## 2022-06-26 RX ORDER — SULFASALAZINE 500 MG/1
TABLET ORAL
Qty: 720 | Refills: 720 | Status: ACTIVE | COMMUNITY
Start: 2016-12-28

## 2022-06-26 RX ORDER — CLONAZEPAM 0.5 MG/1
CLONAZEPAM( 0.5MG ORAL  DAILY ) ACTIVE -HX ENTRY TABLET, ORALLY DISINTEGRATING ORAL DAILY
Status: ACTIVE | COMMUNITY
Start: 2016-06-22

## 2022-06-26 RX ORDER — ALLOPURINOL 100 MG/1
ALLOPURINOL( 100MG ORAL  DAILY ) ACTIVE -HX ENTRY TABLET ORAL DAILY
Status: ACTIVE | COMMUNITY
Start: 2016-06-22

## 2022-06-26 RX ORDER — LISINOPRIL 40 MG/1
LISINOPRIL( 40MG ORAL  DAILY ) ACTIVE -HX ENTRY TABLET ORAL DAILY
Status: ACTIVE | COMMUNITY
Start: 2016-06-22

## 2022-06-26 RX ORDER — HYDROCHLOROTHIAZIDE 25 MG/1
HYDROCHLOROTHIAZIDE( 25MG ORAL  DAILY ) ACTIVE -HX ENTRY TABLET ORAL DAILY
Status: ACTIVE | COMMUNITY
Start: 2016-06-22

## 2022-06-26 RX ORDER — NEBIVOLOL HYDROCHLORIDE 20 MG/1
BYSTOLIC( 20MG ORAL  DAILY ) ACTIVE -HX ENTRY TABLET ORAL DAILY
Status: ACTIVE | COMMUNITY
Start: 2016-06-22

## 2022-06-26 RX ORDER — LATANOPROST 0.005 %
XALATAN( 0.005% OPHTHALMIC  DAILY ) ACTIVE -HX ENTRY DROPS OPHTHALMIC (EYE) DAILY
Status: ACTIVE | COMMUNITY
Start: 2016-06-22

## 2022-09-08 NOTE — PROGRESS NOTE ADULT - PROBLEM SELECTOR PLAN 5
Discharge paperwork and education provided. Patient left facility with all belongings, in stable condition.   f/u with uric acid

## 2022-11-12 NOTE — CHART NOTE - NSCHARTNOTEFT_GEN_A_CORE
Problem: Adult Inpatient Plan of Care  Goal: Plan of Care Review  Outcome: Ongoing, Progressing     VIRTUAL NURSE:  Cued into patient's room.  Permission received per patient to turn camera to view patient.  Introduced as VN for night shift that will be working with floor nurse and nursing assistant.  Educated patient on VN's role in patient care and  [facility] model.  Plan of care reviewed with patient.  Education per flowsheet.   Informed patient that staff will round on them every 2 hours but to use call light for any other needs they may have; informed of fall risk and fall precautions.  Patient verbalized understanding.  Call light within reach; bed siderails up x3.  Opportunity given for questions and questions answered.  Admission assessment questions answered.  Patient denies complaints or any needs at this time. Instructed to call for assistance.  Will cont to monitor and intervene as needed.    Labs, notes, orders, and careplan reviewed.       11/12/22 0379   Patient Request   Patient Requested no complaint or needs currently; spouse at bedside   Admission   Initial VN Admission Questions Complete   Communication Issues? Patient Hearing;Technical Issue   Shift   Virtual Nurse - Rounding Complete   Pain Management Interventions pain management plan reviewed with patient/caregiver   Virtual Nurse - Patient Verbalized Approval Of Camera Use;VN Rounding   Type of Frequent Check   Type Patient Rounds   Safety/Activity   Patient Rounds bed in low position;call light in patient/parent reach;bed wheels locked;placement of personal items at bedside;visualized patient;clutter free environment maintained   Safety Promotion/Fall Prevention assistive device/personal item within reach;commode/urinal/bedpan at bedside;diversional activities provided;Fall Risk reviewed with patient/family;family to remain at bedside;high risk medications identified;medications reviewed;nonskid shoes/socks when out of bed;side rails raised x  Reported 2 episodes of Pauses of 3.62 secs and 4.7 secs at 8:27 and 8:28AM.  Patient asymptomatic, eating BF at that time. Started on BB yesterday. BP stable -D/c'd Lopressor. Hold AVN blockers, monitor for further episodes. Cardiology notified. Awaiting EP    [id number] 3;supervised activity;instructed to call staff for mobility   Safety Precautions emergency equipment at bedside   Positioning   Body Position right;side-lying   Head of Bed (HOB) Positioning HOB at 45 degrees   Pain/Comfort/Sleep   Preferred Pain Scale number (Numeric Rating Pain Scale)   Comfort/Acceptable Pain Level 0   Pain Rating (0-10): Rest 0   Sleep/Rest/Relaxation no problem identified;awake

## 2022-12-13 NOTE — ED ADULT NURSE NOTE - NS ED NURSE DISCH DISPOSITION
Caller: MARISSA WANG    Relationship: Emergency Contact    Best call back number: [phone]    Caller requesting test results: MARISSA, PATIENT'S DAUGHTER    What test was performed: PATHOLOGY OF THE LUNGS    When was the test performed: 12/09/2022    Where was the test performed: [facility]        Discharged

## 2023-01-24 NOTE — PROGRESS NOTE ADULT - ASSESSMENT
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 Found to have anemia and admitted for hemotransfusion and GI workup .Patient had colonoscopy 3 years ago and [place] and had some polyps removed ,he denies any recent hx of GIB or GI workup ,but admits black stools and lightheadedness ,sob ,worsening last few days .Found to have RANJANA elevation and seen by cardiologist Admitted  to telemetry unit for monitoring , send 3 sets of cardiac ensymes to rule out coronary event, obtain ECHO to evaluate LVEF, cardiology consult ,continue current management, O2 supply, anticoagulation plan as per cardiology  ,aggrenox placed on hold until GI clearance will be obtained Nephrology cons called ,IV hydration is administrated ,lisinopril held as per cardiologist recommendation . (15 Jul 2019 08:57) Yes

## 2023-05-17 NOTE — PROGRESS NOTE ADULT - ASSESSMENT
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 Found to have anemia and admitted for hemotransfusion and GI workup .Patient had colonoscopy 3 years ago and [place] and had some polyps removed ,he denies any recent hx of GIB or GI workup ,but admits black stools and lightheadedness ,sob ,worsening last few days .Found to have RANJANA elevation and seen by cardiologist Admitted  to telemetry unit for monitoring , send 3 sets of cardiac ensymes to rule out coronary event, obtain ECHO to evaluate LVEF, cardiology consult ,continue current management, O2 supply, anticoagulation plan as per cardiology  ,aggrenox placed on hold until GI clearance will be obtained Nephrology cons called ,IV hydration is administrated ,lisinopril held as per cardiologist recommendation . (15 Jul 2019 08:57) IBW

## 2023-09-26 NOTE — ED PROVIDER NOTE - IV ALTEPASE ADMIN HIDDEN
show Detail Level: Detailed Add 1585x Cpt? (Do Not Bill If You Billed For The Procedure Placing The Sutures. This Is An Add-On Code That Must Be Billed With An E/M Visit Code): No

## 2023-10-25 NOTE — CONSULT NOTE ADULT - SUBJECTIVE AND OBJECTIVE BOX
PCP: Jean-Claude   HPI: 83m pmh parkinsons, UC, htn who was transferred from [facility] after a fall at the [facility]. Pt notes that he fell and hit his neck on the side of the TV table at assisted living, denies LOC/syncope. No f/c/n/v/diarrhea/cp/sob. Pt had a neck laceration repaired at [facility]. Currently pt feels well, notes some neck pain.       PAST MEDICAL & SURGICAL HISTORY:  UC (ulcerative colitis)  HTN (hypertension)  UC (ulcerative colitis)  HTN (hypertension)  Gout  Chronic kidney disease  Acoustic neuroma  No significant past surgical history  No significant past surgical history      Review of Systems:   CONSTITUTIONAL: No fever, weight loss  EYES: No eye pain  ENMT:  No difficulty hearing  NECK: No pain or stiffness  RESPIRATORY: No cough, no shortness of breath  CARDIOVASCULAR: No chest pain, palpitations  GASTROINTESTINAL: No abdominal pain. No nausea, vomiting, or diarrhea  GENITOURINARY: No dysuria  NEUROLOGICAL: No headaches, loss of strength  SKIN: No rashes  MUSCULOSKELETAL: per hpi   PSYCHIATRIC: No depression  HEME/LYMPH: No easy bruising, or bleeding gums      Allergies    No Known Allergies    Intolerances        Social History:  neg etoh/tobacco/drugs    FAMILY HISTORY: none       MEDICATIONS  (STANDING):  allopurinol 100 milliGRAM(s) Oral two times a day  amLODIPine   Tablet 5 milliGRAM(s) Oral daily  atorvastatin 20 milliGRAM(s) Oral at bedtime  carbidopa/levodopa  25/100 1 Tablet(s) Oral three times a day  cephalexin 500 milliGRAM(s) Oral every 12 hours  dexamethasone  IVPB 8 milliGRAM(s) IV Intermittent every 8 hours  heparin  Injectable 5000 Unit(s) SubCutaneous every 8 hours  latanoprost 0.005% Ophthalmic Solution 1 Drop(s) Both EYES at bedtime  lisinopril 40 milliGRAM(s) Oral daily  sulfaSALAzine 1000 milliGRAM(s) Oral daily  sulfaSALAzine 500 milliGRAM(s) Oral at bedtime  timolol 0.25% Solution 1 Drop(s) Both EYES two times a day    MEDICATIONS  (PRN):  ondansetron Injectable 4 milliGRAM(s) IV Push every 6 hours PRN Nausea      Vital Signs Last 24 Hrs  T(C): 36.3 (18 Apr 2018 13:48), Max: 37 (17 Apr 2018 18:28)  T(F): 97.3 (18 Apr 2018 13:48), Max: 98.6 (17 Apr 2018 18:28)  HR: 88 (18 Apr 2018 14:00) (66 - 88)  BP: 155/90 (18 Apr 2018 14:00) (148/80 - 173/91)  BP(mean): --  RR: 18 (18 Apr 2018 13:48) (18 - 18)  SpO2: 96% (18 Apr 2018 14:00) (95% - 98%)  CAPILLARY BLOOD GLUCOSE        I&O's Summary    17 Apr 2018 07:01  -  18 Apr 2018 07:00  --------------------------------------------------------  IN: 600 mL / OUT: 375 mL / NET: 225 mL    18 Apr 2018 07:01  -  18 Apr 2018 15:12  --------------------------------------------------------  IN: 515 mL / OUT: 500 mL / NET: 15 mL        PHYSICAL EXAM:  GENERAL: NAD, well-developed  HEAD:  Atraumatic, Normocephalic  EYES: EOMI, PERRLA, conjunctiva and sclera clear  NECK: neck laceration cdi, stitches in place, ecchymosis R neck, mild ttp   CHEST/LUNG: Clear to auscultation bilaterally; No wheeze  HEART: Regular rate and rhythm; No murmurs, rubs, or gallops  ABDOMEN: Soft, Nontender, Nondistended; Bowel sounds present  EXTREMITIES:  2+ Peripheral Pulses, No clubbing, cyanosis, or edema  PSYCH: AAOx3  NEUROLOGY: non-focal  SKIN: No rashes or lesions    LABS:                        10.1   5.86  )-----------( 156      ( 18 Apr 2018 09:16 )             31.3     04-18    142  |  106  |  38<H>  ----------------------------<  116<H>  4.8   |  21<L>  |  2.04<H>    Ca    9.2      18 Apr 2018 07:19    TPro  7.5  /  Alb  4.1  /  TBili  0.5  /  DBili  x   /  AST  28  /  ALT  21  /  AlkPhos  115  04-17    PT/INR - ( 17 Apr 2018 13:33 )   PT: 10.9 sec;   INR: 1.00 ratio         PTT - ( 17 Apr 2018 13:33 )  PTT:42.1 sec          RADIOLOGY & ADDITIONAL TESTS:    Imaging Personally Reviewed:    EKG:     Consultant(s) Notes Reviewed:      Care Discussed with Consultants/Other Providers: Surgery Prednisone Counseling:  I discussed with the patient the risks of prolonged use of prednisone including but not limited to weight gain, insomnia, osteoporosis, mood changes, diabetes, susceptibility to infection, glaucoma and high blood pressure.  In cases where prednisone use is prolonged, patients should be monitored with blood pressure checks, serum glucose levels and an eye exam.  Additionally, the patient may need to be placed on GI prophylaxis, PCP prophylaxis, and calcium and vitamin D supplementation and/or a bisphosphonate.  The patient verbalized understanding of the proper use and the possible adverse effects of prednisone.  All of the patient's questions and concerns were addressed.

## 2023-10-27 NOTE — PROGRESS NOTE ADULT - PROBLEM SELECTOR PLAN 1
Nursing CHRONIC KIDNEY DISEASE, STAGE 3: start sodium bicarbonate 650 mg po   gfr is increased   increase water intake , continue with iv fluid   Serum creatinine is stable at 2.17 , approximating a GFR of is increased   There is no progression.  No uremic symptoms. No evidence of  worsening  Anemia. Fluid status stable.   Will continue to avoid nephrotoxic drugs.  Patient remains asymptomatic.

## 2023-12-28 NOTE — PHYSICAL THERAPY INITIAL EVALUATION ADULT - ACTIVE RANGE OF MOTION EXAMINATION, REHAB EVAL
----- Message from Nurys Vega DO sent at 12/28/2023  9:00 AM EST -----  Regarding: FW: Greenish yellow from nose congestion   Contact: [phone]  Pt needs a virtual visit,  Thank you,  Nurys Vega DO    ----- Message -----  From: Valeria Malave MA  Sent: 12/28/2023   8:30 AM EST  To: Nurys Vega DO  Subject: FW: Greenish yellow from nose congestion           ----- Message -----  From: Luis M Red  Sent: 12/28/2023   8:29 AM EST  To: #  Subject: Greenish yellow from nose congestion             Good morning I have been a little congested for the past three days last night I was unable to sleep due to being so congested and not able to breath out of nose. This morning when I tried to blow my nose it was greenish yellow and I am only congested on my right side. I also have a slight sore throat. Is there anything you would advise for me to do?       bilateral upper extremity Active ROM was WFL (within functional limits)/bilateral  lower extremity Active ROM was WFL (within functional limits)

## 2024-04-29 NOTE — PROGRESS NOTE ADULT - PROBLEM SELECTOR PLAN 1
FYI: patient was seen in the ED on 4/26 for possible aspiration. Patient is not having any symptoms at all at this time.    virtual colon is negative  no further gi bleed  long discussion with family, decision made to forgo  upper gastrointestinal endoscopy at this time given improvement with proton pump inhibitor and holding a/c   Regular diet  dc planning virtual colon is negative  no further gi bleed  if stable h/h no GI contraindication to asa   long discussion with family, decision made to forgo  upper gastrointestinal endoscopy at this time given improvement with proton pump inhibitor and holding a/c   Regular diet  dc planning

## 2024-09-19 NOTE — ED ADULT NURSE NOTE - SKIN INTEGRITY
REASON FOR VISIT:  Chief Complaint   Patient presents with    Office Visit    Follow-up     Follow up macular degeneration of both eyes       HISTORY OF PRESENT ILLNESS:  84 year old male is here for f/u visit. Pt says that vision seems to be stable. Hasn't noticed any changes on Amsler grid. Checks it regularly.       ASSESSMENT:  1. Early dry stage nonexudative age-related macular degeneration of both eyes    2. Pseudophakia of both eyes    3. Bilateral posterior capsular opacification         PLAN:  Orders Placed This Encounter    SCANNING COMPUTERIZED OPHTHALMIC IMAGING RETINA       FOLLOWUP:  Return in about 7 months (around 4/19/2025).       History:    Past Medical History:   Diagnosis Date    Anxiety disoder 09/16/2013    Arthritis     Branch retinal vein occlusion with macular edema (CMD)     right eye    Cataract of both eyes 09/24/2014    Chronic kidney disease, stage 4 (severe)  (CMD)     Diabetes mellitus type II 09/16/2013    Diabetic polyneuropathy associated with type 2 diabetes mellitus  (CMD) 09/16/2013    Difficulty in walking(719.7) 09/16/2013    Dyspnea 09/16/2013    Essential (primary) hypertension     History of prostate cancer 2008    surgery and radiation    Hyperlipidemia     Lumbago 09/16/2013    Lumbosacral radiculitis 09/16/2013    Microhematuria 07/20/2020    Slowing of urinary stream 06/05/2020    Unspecified hypothyroidism 02/09/2015       Past Surgical History:   Procedure Laterality Date    Cataract extraction w/ intraocular lens implant      Right eye 12/4/2018, Left eye 12/18/2018    Multiple tooth extractions      Prostatectomy  04/14/2008    for prostate cancer    Tonsillectomy and adenoidectomy Bilateral 1946       Social History     Socioeconomic History    Marital status: /Civil Union     Spouse name: Not on file    Number of children: 2    Years of education: Not on file    Highest education level: Not on file   Occupational History    Occupation: retired   Tobacco  Use    Smoking status: Former     Current packs/day: 0.00     Types: Cigarettes     Quit date: 1964     Years since quittin.7     Passive exposure: Never    Smokeless tobacco: Never   Vaping Use    Vaping status: never used   Substance and Sexual Activity    Alcohol use: Yes     Comment: less than one a month    Drug use: No    Sexual activity: Yes     Partners: Female     Birth control/protection: Post-menopausal, Surgical     Comment: wife with hysterectomy   Other Topics Concern    Not on file   Social History Narrative    . Lives with his wife. Has 2 children (son and daughter). Retired, worked for [employer] and [employer].      Social Determinants of Health     Financial Resource Strain: Medium Risk (2024)    Financial Resource Strain     Unable to Get: Medicine or Any Health Care (Medical, Dental, Mental Health, Vision)   Food Insecurity: Low Risk  (2024)    Food Insecurity     Worried about Food: Never true     Food is Gone: Never true   Transportation Needs: Not At Risk (2024)    Transportation Needs     Lack of Reliable Transportation: No   Physical Activity: High Risk (2024)    Exercise Vital Sign     Days of Exercise per Week: 0 days     Minutes of Exercise per Session: 0 min   Stress: High Risk (2024)    Stress     How Stressed: Very much   Social Connections: Low Risk  (2024)    Social Connections     Social Connectivity: 5 or more times a week   Interpersonal Safety: Not At Risk (3/29/2021)    Interpersonal Safety     Social Determinants: Intimate Partner Violence Past Fear: Not on file     Social Determinants: Intimate Partner Violence Current Fear: Not on file       family history includes Cancer, Pancreatic in his maternal uncle; Cataracts in his father; Diabetes in his maternal grandmother; Heart disease in his father and mother; Hypertension in his mother; Osteoarthritis in his father.    ALLERGIES:   Allergen Reactions    Metformin DIARRHEA        Family History:  I reviewed the technician's history as outlined in EPIC; there are no additions.    Pertinent systemic medications:  See EPIC for full medication list.      A1C:    Hemoglobin A1C (%)   Date Value   03/11/2024 7.7 (H)         Tonometry  Applanation:     Tonopen:    Other:      Intraocular Pressure (mm Hg)  Intraocular Pressure (mm Hg)  RIGHT EYE LEFT EYE Date  Time   18 18 9/28/2015 8:54:34 AM     Glaucoma Medication Start Date:      Eye medications:   ATs PRN both eyes.     Past Ocular History:    wears glasses Optic disc drusen left  Macular edema sec to BRVO right, s/p series of Avastin and ?FOMAC with Dr. Miranda  CE with IOL Right 12/04/2018  CE with IOL Left 12/18/2018.  Diabetes (II) since 2008, on oral meds.      Family Ocular History:    Glaucoma:  no    Retinal detachment:  no    Macular degeneration:  no    Amblyopia or strabismus:  no        Ocular Coherence Tomography Macula Interpretation Report:    Central macular thickness: 216, 233  Right Eye:  RPE (Retinal pigment epithelium) irregularity, drusen, central drusenoid PED, no fluid, stable compared to last OCT  Left Eye:  RPE irregularity, drusen, no fluid      Impression and Plan:  1. Macular Degeneration, both eyes: Dry AMD both eyes. H/o macular laser likely contributing to pig changes in right. OCT Macula done today shows no fluid. No heme/thickening on exam. BRVO could have been contributing to the fluid. Recommend eye vitamins/healthy diet/sun protection/avoidance of smoking.   2. Pseudophakia, both eyes: Has PCO both eyes but pt asymptomatic. Will watch for now.    intact/bruising

## 2024-09-19 NOTE — INPATIENT CERTIFICATION FOR MEDICARE PATIENTS - RISKS OF ADVERSE EVENTS
What Type Of Note Output Would You Prefer (Optional)?: Standard Output How Severe Is Your Skin Lesion?: mild Has Your Skin Lesion Been Treated?: not been treated Is This A New Presentation, Or A Follow-Up?: Skin Lesion Concern for cardiopulmonary deterioration/Concern for delay in diagnosis and treatment
